# Patient Record
Sex: MALE | Race: BLACK OR AFRICAN AMERICAN | Employment: FULL TIME | URBAN - METROPOLITAN AREA
[De-identification: names, ages, dates, MRNs, and addresses within clinical notes are randomized per-mention and may not be internally consistent; named-entity substitution may affect disease eponyms.]

---

## 2017-02-09 ENCOUNTER — OFFICE VISIT (OUTPATIENT)
Dept: CARDIOLOGY CLINIC | Age: 62
End: 2017-02-09

## 2017-02-09 VITALS
SYSTOLIC BLOOD PRESSURE: 120 MMHG | HEART RATE: 68 BPM | DIASTOLIC BLOOD PRESSURE: 70 MMHG | WEIGHT: 299.6 LBS | BODY MASS INDEX: 39.53 KG/M2

## 2017-02-09 DIAGNOSIS — I25.10 CAD S/P PERCUTANEOUS CORONARY ANGIOPLASTY: ICD-10-CM

## 2017-02-09 DIAGNOSIS — Z98.61 CAD S/P PERCUTANEOUS CORONARY ANGIOPLASTY: ICD-10-CM

## 2017-02-09 DIAGNOSIS — I10 ESSENTIAL HYPERTENSION: Primary | ICD-10-CM

## 2017-02-09 PROCEDURE — 99213 OFFICE O/P EST LOW 20 MIN: CPT | Performed by: INTERNAL MEDICINE

## 2017-02-10 RX ORDER — POTASSIUM CHLORIDE 1500 MG/1
TABLET, EXTENDED RELEASE ORAL
Qty: 90 TABLET | Refills: 3 | Status: SHIPPED | OUTPATIENT
Start: 2017-02-10 | End: 2017-10-26 | Stop reason: SDUPTHER

## 2017-02-11 LAB
A/G RATIO: 1.6 (CALC) (ref 1–2.5)
ALBUMIN SERPL-MCNC: 4.1 G/DL (ref 3.6–5.1)
ALP BLD-CCNC: 68 U/L (ref 40–115)
ALT SERPL-CCNC: 20 U/L (ref 9–46)
AST SERPL-CCNC: 19 U/L (ref 10–35)
ATYPICAL LYMPHOCYTE RELATIVE PERCENT: NORMAL % (ref 0–10)
BAND NEUTROPHILS: NORMAL %
BANDED NEUTROPHILS ABSOLUTE COUNT: NORMAL CELLS/UL (ref 0–750)
BASOPHILS ABSOLUTE: 21 CELLS/UL (ref 0–200)
BASOPHILS RELATIVE PERCENT: 0.4 %
BILIRUB SERPL-MCNC: 0.8 MG/DL (ref 0.2–1.2)
BLASTS ABSOLUTE COUNT: NORMAL CELLS/UL
BLASTS RELATIVE PERCENT: NORMAL %
BUN / CREAT RATIO: NORMAL (CALC) (ref 6–22)
BUN BLDV-MCNC: 20 MG/DL (ref 7–25)
CALCIUM SERPL-MCNC: 9.6 MG/DL (ref 8.6–10.3)
CHLORIDE BLD-SCNC: 107 MMOL/L (ref 98–110)
CHOLESTEROL: 138 MG/DL (CALC)
CO2: 29 MMOL/L (ref 20–31)
COMMENT: NORMAL
CREAT SERPL-MCNC: 0.94 MG/DL (ref 0.7–1.25)
EOSINOPHILS ABSOLUTE: 109 CELLS/UL (ref 15–500)
EOSINOPHILS RELATIVE PERCENT: 2.1 %
GFR AFRICAN AMERICAN: 100 ML/MIN/1.73M2
GFR SERPL CREATININE-BSD FRML MDRD: 86 ML/MIN/1.73M2
GLOBULIN: 2.6 G/DL (CALC) (ref 1.9–3.7)
GLUCOSE BLD-MCNC: 95 MG/DL (ref 65–99)
HCT VFR BLD CALC: 43.1 % (ref 38.5–50)
HEMOGLOBIN: 14.2 G/DL (ref 13.2–17.1)
LYMPHOCYTES ABSOLUTE: 1461 CELLS/UL (ref 850–3900)
LYMPHOCYTES RELATIVE PERCENT: 28.1 %
MCH RBC QN AUTO: 30.2 PG (ref 27–33)
MCHC RBC AUTO-ENTMCNC: 32.9 G/DL (ref 32–36)
MCV RBC AUTO: 91.8 FL (ref 80–100)
METAMYELOCYTES ABSOLUTE COUNT: NORMAL CELLS/UL
METAMYELOCYTES RELATIVE PERCENT: NORMAL %
MONOCYTES ABSOLUTE: 374 CELLS/UL (ref 200–950)
MONOCYTES RELATIVE PERCENT: 7.2 %
MYELOCYTE PERCENT: NORMAL %
MYELOCYTES ABSOLUTE COUNT: NORMAL CELLS/UL
NEUTROPHILS ABSOLUTE: 3234 CELLS/UL (ref 1500–7800)
NUCLEATED RED BLOOD CELLS: NORMAL /100 WBC
NUCLEATED RED BLOOD CELLS: NORMAL CELLS/UL
PDW BLD-RTO: 13.1 % (ref 11–15)
PLATELET # BLD: 208 THOUSAND/UL (ref 140–400)
PMV BLD AUTO: 8.9 FL (ref 7.5–12.5)
POTASSIUM SERPL-SCNC: 5.2 MMOL/L (ref 3.5–5.3)
PROMYELOCYTES ABSOLUTE COUNT: NORMAL CELLS/UL
PROMYELOCYTES PERCENT: NORMAL %
RBC # BLD: 4.7 MILLION/UL (ref 4.2–5.8)
SEGMENTED NEUTROPHILS RELATIVE PERCENT: 62.2 %
SODIUM BLD-SCNC: 143 MMOL/L (ref 135–146)
TOTAL PROTEIN: 6.7 G/DL (ref 6.1–8.1)
WBC # BLD: 5.2 THOUSAND/UL (ref 3.8–10.8)

## 2017-02-15 ENCOUNTER — TELEPHONE (OUTPATIENT)
Dept: CARDIOLOGY CLINIC | Age: 62
End: 2017-02-15

## 2017-03-01 ENCOUNTER — TELEPHONE (OUTPATIENT)
Dept: CARDIOLOGY CLINIC | Age: 62
End: 2017-03-01

## 2017-03-01 ENCOUNTER — HOSPITAL ENCOUNTER (OUTPATIENT)
Dept: ENDOSCOPY | Age: 62
Discharge: HOME | End: 2017-03-01
Attending: INTERNAL MEDICINE | Admitting: INTERNAL MEDICINE

## 2017-03-01 VITALS
BODY MASS INDEX: 38.3 KG/M2 | WEIGHT: 289 LBS | SYSTOLIC BLOOD PRESSURE: 135 MMHG | DIASTOLIC BLOOD PRESSURE: 83 MMHG | TEMPERATURE: 97.9 F | OXYGEN SATURATION: 98 % | HEART RATE: 66 BPM | HEIGHT: 73 IN | RESPIRATION RATE: 18 BRPM

## 2017-03-01 ASSESSMENT — PAIN - FUNCTIONAL ASSESSMENT: PAIN_FUNCTIONAL_ASSESSMENT: 0-10

## 2017-05-05 ENCOUNTER — HOSPITAL ENCOUNTER (OUTPATIENT)
Dept: ENDOSCOPY | Age: 62
Discharge: HOME | End: 2017-05-05
Attending: INTERNAL MEDICINE | Admitting: INTERNAL MEDICINE

## 2017-05-05 VITALS
SYSTOLIC BLOOD PRESSURE: 125 MMHG | WEIGHT: 285 LBS | OXYGEN SATURATION: 98 % | RESPIRATION RATE: 20 BRPM | HEIGHT: 73 IN | BODY MASS INDEX: 37.77 KG/M2 | DIASTOLIC BLOOD PRESSURE: 67 MMHG | TEMPERATURE: 97.7 F | HEART RATE: 62 BPM

## 2017-05-05 RX ORDER — CLOPIDOGREL BISULFATE 75 MG/1
75 TABLET ORAL DAILY
COMMUNITY
End: 2017-11-14

## 2017-05-05 ASSESSMENT — PAIN - FUNCTIONAL ASSESSMENT: PAIN_FUNCTIONAL_ASSESSMENT: 0-10

## 2017-05-10 ENCOUNTER — OFFICE VISIT (OUTPATIENT)
Dept: FAMILY MEDICINE CLINIC | Age: 62
End: 2017-05-10

## 2017-05-10 VITALS
DIASTOLIC BLOOD PRESSURE: 80 MMHG | TEMPERATURE: 98.1 F | SYSTOLIC BLOOD PRESSURE: 138 MMHG | BODY MASS INDEX: 39.5 KG/M2 | WEIGHT: 299.4 LBS

## 2017-05-10 DIAGNOSIS — L03.011 CELLULITIS OF FINGER OF RIGHT HAND: Primary | ICD-10-CM

## 2017-05-10 PROCEDURE — 99213 OFFICE O/P EST LOW 20 MIN: CPT | Performed by: FAMILY MEDICINE

## 2017-05-10 RX ORDER — CEPHALEXIN 500 MG/1
500 CAPSULE ORAL 4 TIMES DAILY
Qty: 40 CAPSULE | Refills: 0 | Status: ON HOLD | OUTPATIENT
Start: 2017-05-10 | End: 2018-09-25 | Stop reason: ALTCHOICE

## 2017-05-10 ASSESSMENT — ENCOUNTER SYMPTOMS: EYES NEGATIVE: 1

## 2017-05-15 ENCOUNTER — NURSE ONLY (OUTPATIENT)
Dept: CARDIOLOGY CLINIC | Age: 62
End: 2017-05-15

## 2017-05-15 DIAGNOSIS — I25.10 CORONARY ARTERY DISEASE INVOLVING NATIVE CORONARY ARTERY OF NATIVE HEART WITHOUT ANGINA PECTORIS: Primary | ICD-10-CM

## 2017-05-15 PROCEDURE — 93000 ELECTROCARDIOGRAM COMPLETE: CPT | Performed by: INTERNAL MEDICINE

## 2017-05-16 ENCOUNTER — TELEPHONE (OUTPATIENT)
Dept: CARDIOLOGY | Age: 62
End: 2017-05-16

## 2017-05-22 ENCOUNTER — OFFICE VISIT (OUTPATIENT)
Dept: FAMILY MEDICINE CLINIC | Age: 62
End: 2017-05-22

## 2017-05-22 VITALS
BODY MASS INDEX: 38.99 KG/M2 | HEART RATE: 59 BPM | WEIGHT: 294.2 LBS | SYSTOLIC BLOOD PRESSURE: 112 MMHG | HEIGHT: 73 IN | TEMPERATURE: 98.7 F | DIASTOLIC BLOOD PRESSURE: 72 MMHG

## 2017-05-22 DIAGNOSIS — M67.911 DISORDER OF RIGHT ROTATOR CUFF: ICD-10-CM

## 2017-05-22 DIAGNOSIS — Z95.820 S/P ANGIOPLASTY WITH STENT: ICD-10-CM

## 2017-05-22 DIAGNOSIS — Z01.818 PREOPERATIVE CLEARANCE: Primary | ICD-10-CM

## 2017-05-22 PROCEDURE — 99242 OFF/OP CONSLTJ NEW/EST SF 20: CPT | Performed by: FAMILY MEDICINE

## 2017-05-22 ASSESSMENT — ENCOUNTER SYMPTOMS
WHEEZING: 0
RESPIRATORY NEGATIVE: 1
EYES NEGATIVE: 1
SHORTNESS OF BREATH: 0
ALLERGIC/IMMUNOLOGIC NEGATIVE: 1

## 2017-06-22 RX ORDER — METOPROLOL SUCCINATE 50 MG/1
TABLET, EXTENDED RELEASE ORAL
Qty: 90 TABLET | Refills: 3 | Status: SHIPPED | OUTPATIENT
Start: 2017-06-22 | End: 2018-04-05 | Stop reason: SDUPTHER

## 2017-08-02 RX ORDER — FUROSEMIDE 40 MG/1
TABLET ORAL
Qty: 90 TABLET | Refills: 2 | Status: SHIPPED | OUTPATIENT
Start: 2017-08-02 | End: 2017-12-19 | Stop reason: SDUPTHER

## 2017-08-07 RX ORDER — SIMVASTATIN 80 MG
TABLET ORAL
Qty: 90 TABLET | Refills: 5 | Status: SHIPPED | OUTPATIENT
Start: 2017-08-07 | End: 2018-08-23

## 2017-08-10 ENCOUNTER — OFFICE VISIT (OUTPATIENT)
Dept: CARDIOLOGY CLINIC | Age: 62
End: 2017-08-10

## 2017-08-10 VITALS
DIASTOLIC BLOOD PRESSURE: 60 MMHG | SYSTOLIC BLOOD PRESSURE: 130 MMHG | WEIGHT: 297.58 LBS | BODY MASS INDEX: 39.26 KG/M2 | HEART RATE: 70 BPM

## 2017-08-10 DIAGNOSIS — I25.10 CORONARY ARTERY DISEASE INVOLVING NATIVE CORONARY ARTERY OF NATIVE HEART WITHOUT ANGINA PECTORIS: Primary | ICD-10-CM

## 2017-08-10 DIAGNOSIS — E78.00 HYPERCHOLESTEROLEMIA: ICD-10-CM

## 2017-08-10 PROCEDURE — 99214 OFFICE O/P EST MOD 30 MIN: CPT | Performed by: INTERNAL MEDICINE

## 2017-08-30 ENCOUNTER — TELEPHONE (OUTPATIENT)
Dept: CARDIOLOGY CLINIC | Age: 62
End: 2017-08-30

## 2017-09-01 ENCOUNTER — OFFICE VISIT (OUTPATIENT)
Dept: FAMILY MEDICINE CLINIC | Age: 62
End: 2017-09-01

## 2017-09-01 ENCOUNTER — NURSE ONLY (OUTPATIENT)
Dept: CARDIOLOGY CLINIC | Age: 62
End: 2017-09-01

## 2017-09-01 VITALS
WEIGHT: 294.2 LBS | DIASTOLIC BLOOD PRESSURE: 74 MMHG | SYSTOLIC BLOOD PRESSURE: 120 MMHG | HEIGHT: 73 IN | HEART RATE: 67 BPM | BODY MASS INDEX: 38.99 KG/M2

## 2017-09-01 DIAGNOSIS — M24.212: ICD-10-CM

## 2017-09-01 DIAGNOSIS — G47.33 OBSTRUCTIVE SLEEP APNEA SYNDROME: ICD-10-CM

## 2017-09-01 DIAGNOSIS — I10 ESSENTIAL HYPERTENSION: ICD-10-CM

## 2017-09-01 DIAGNOSIS — Z01.818 PREOPERATIVE CLEARANCE: Primary | ICD-10-CM

## 2017-09-01 DIAGNOSIS — I25.10 CORONARY ARTERY DISEASE INVOLVING NATIVE CORONARY ARTERY OF NATIVE HEART WITHOUT ANGINA PECTORIS: Primary | ICD-10-CM

## 2017-09-01 PROCEDURE — 93000 ELECTROCARDIOGRAM COMPLETE: CPT | Performed by: INTERNAL MEDICINE

## 2017-09-01 PROCEDURE — 99242 OFF/OP CONSLTJ NEW/EST SF 20: CPT | Performed by: FAMILY MEDICINE

## 2017-09-01 ASSESSMENT — ENCOUNTER SYMPTOMS
EYES NEGATIVE: 1
APNEA: 1

## 2017-09-01 ASSESSMENT — PATIENT HEALTH QUESTIONNAIRE - PHQ9
SUM OF ALL RESPONSES TO PHQ9 QUESTIONS 1 & 2: 0
SUM OF ALL RESPONSES TO PHQ QUESTIONS 1-9: 0
1. LITTLE INTEREST OR PLEASURE IN DOING THINGS: 0
2. FEELING DOWN, DEPRESSED OR HOPELESS: 0

## 2017-09-08 ENCOUNTER — TELEPHONE (OUTPATIENT)
Dept: CARDIOLOGY CLINIC | Age: 62
End: 2017-09-08

## 2017-09-08 LAB
A/G RATIO: 1.7 (CALC) (ref 1–2.5)
ALBUMIN SERPL-MCNC: 3.9 G/DL (ref 3.6–5.1)
ALP BLD-CCNC: 74 U/L (ref 40–115)
ALT SERPL-CCNC: 35 U/L (ref 9–46)
AST SERPL-CCNC: 26 U/L (ref 10–35)
BILIRUB SERPL-MCNC: 0.6 MG/DL (ref 0.2–1.2)
BUN / CREAT RATIO: ABNORMAL (CALC) (ref 6–22)
BUN BLDV-MCNC: 11 MG/DL (ref 7–25)
CALCIUM SERPL-MCNC: 9.4 MG/DL (ref 8.6–10.3)
CHLORIDE BLD-SCNC: 106 MMOL/L (ref 98–110)
CHOLESTEROL, TOTAL: 161 MG/DL
CHOLESTEROL/HDL RATIO: 2.6 (CALC)
CHOLESTEROL: 99 MG/DL (CALC)
CO2: 29 MMOL/L (ref 20–31)
CREAT SERPL-MCNC: 0.88 MG/DL (ref 0.7–1.25)
GFR AFRICAN AMERICAN: 105 ML/MIN/1.73M2
GFR SERPL CREATININE-BSD FRML MDRD: 91 ML/MIN/1.73M2
GLOBULIN: 2.3 G/DL (CALC) (ref 1.9–3.7)
GLUCOSE BLD-MCNC: 105 MG/DL (ref 65–99)
HDLC SERPL-MCNC: 62 MG/DL
LDL CHOLESTEROL CALCULATED: 82 MG/DL (CALC)
POTASSIUM SERPL-SCNC: 5 MMOL/L (ref 3.5–5.3)
SODIUM BLD-SCNC: 140 MMOL/L (ref 135–146)
TOTAL PROTEIN: 6.2 G/DL (ref 6.1–8.1)
TRIGL SERPL-MCNC: 91 MG/DL

## 2017-09-13 RX ORDER — CLOPIDOGREL BISULFATE 75 MG/1
TABLET ORAL
Qty: 90 TABLET | Refills: 3 | Status: SHIPPED | OUTPATIENT
Start: 2017-09-13 | End: 2018-04-05 | Stop reason: SDUPTHER

## 2017-09-14 ENCOUNTER — TELEPHONE (OUTPATIENT)
Dept: CARDIOLOGY CLINIC | Age: 62
End: 2017-09-14

## 2017-09-18 DIAGNOSIS — E78.00 PURE HYPERCHOLESTEROLEMIA: Primary | ICD-10-CM

## 2017-10-09 RX ORDER — IBUPROFEN 800 MG/1
800 TABLET ORAL EVERY 6 HOURS PRN
Qty: 120 TABLET | Refills: 3 | Status: ON HOLD | OUTPATIENT
Start: 2017-10-09 | End: 2018-09-25 | Stop reason: HOSPADM

## 2017-10-26 NOTE — TELEPHONE ENCOUNTER
Jm Yen called. He stated he recently went Vegan and Dr. Matt Vo took him off his statin and his cholesterol continues to be good. But the swelling in his legs doesn't seem to ever go away. He does take lasix 40 mg Monday, Wednesday and Friday but even when he takes the lasix's those days the swelling does not seem to get any better. He does a cardio workout 6 days a week and has no problem heart wise but does not know what to do about the swelling. He wasn't sure if this was something he could take care of over the phone or if Dr. Matt Vo would like to see him in the office. Please advise. You can reach Jm Yen back @ [phone].   Thanks

## 2017-10-27 RX ORDER — POTASSIUM CHLORIDE 20 MEQ/1
20 TABLET, EXTENDED RELEASE ORAL DAILY
Qty: 90 TABLET | Refills: 3 | Status: SHIPPED | OUTPATIENT
Start: 2017-10-27 | End: 2018-04-05 | Stop reason: SDUPTHER

## 2017-11-14 ENCOUNTER — OFFICE VISIT (OUTPATIENT)
Dept: CARDIOLOGY CLINIC | Age: 62
End: 2017-11-14

## 2017-11-14 ENCOUNTER — TELEPHONE (OUTPATIENT)
Dept: CARDIOLOGY CLINIC | Age: 62
End: 2017-11-14

## 2017-11-14 VITALS
SYSTOLIC BLOOD PRESSURE: 130 MMHG | WEIGHT: 300 LBS | DIASTOLIC BLOOD PRESSURE: 80 MMHG | BODY MASS INDEX: 39.58 KG/M2 | HEART RATE: 60 BPM

## 2017-11-14 DIAGNOSIS — I10 ESSENTIAL HYPERTENSION: ICD-10-CM

## 2017-11-14 DIAGNOSIS — R60.0 LOCALIZED EDEMA: ICD-10-CM

## 2017-11-14 DIAGNOSIS — I25.10 CORONARY ARTERY DISEASE INVOLVING NATIVE CORONARY ARTERY OF NATIVE HEART WITHOUT ANGINA PECTORIS: Primary | ICD-10-CM

## 2017-11-14 PROCEDURE — G8427 DOCREV CUR MEDS BY ELIG CLIN: HCPCS | Performed by: NURSE PRACTITIONER

## 2017-11-14 PROCEDURE — 1036F TOBACCO NON-USER: CPT | Performed by: NURSE PRACTITIONER

## 2017-11-14 PROCEDURE — G8484 FLU IMMUNIZE NO ADMIN: HCPCS | Performed by: NURSE PRACTITIONER

## 2017-11-14 PROCEDURE — 3017F COLORECTAL CA SCREEN DOC REV: CPT | Performed by: NURSE PRACTITIONER

## 2017-11-14 PROCEDURE — G8417 CALC BMI ABV UP PARAM F/U: HCPCS | Performed by: NURSE PRACTITIONER

## 2017-11-14 PROCEDURE — G8598 ASA/ANTIPLAT THER USED: HCPCS | Performed by: NURSE PRACTITIONER

## 2017-11-14 PROCEDURE — 99214 OFFICE O/P EST MOD 30 MIN: CPT | Performed by: NURSE PRACTITIONER

## 2017-11-14 ASSESSMENT — ENCOUNTER SYMPTOMS
SINUS PAIN: 1
RESPIRATORY NEGATIVE: 1
GASTROINTESTINAL NEGATIVE: 1

## 2017-11-14 NOTE — LETTER
[address] [place] [address]  Phone: [phone]  Fax: [phone]    Angélica Aaron NP        November 14, 2017     Jacob Donohue MD  [address]  [facility] [address]    Patient: Irene Nava  MR Number: [id number]  YOB: 1955  Date of Visit: 11/14/2017    Dear Dr. Jacob Donohue:    I had the pleasure of seeing MR. Misty Crowell today for LE edema. If you have questions, please do not hesitate to call me. I look forward to following Ilda Ocampo along with you.     Sincerely,        Angélica Aaron NP

## 2017-11-14 NOTE — PROGRESS NOTES
works better than the other  2. Labs: BNP, BMP in one week  3. Referrals: Echo, consider venous duplex u/s to r/o venous insufficiency  4. Lifestyle Recommendations: decrease fluid intake to 2500cc/day, compression stockings, daily wts,   5. Follow up: with Dr. Jimena Jarrett for cardiac clearance next week, call if no change in sx/wt in one week  6. CHF Resource Line: [phone]      I appreciate the opportunity of cooperating in the care of this individual.    Basilio Astudillo CNP, 11/14/2017, 1:09 PM    QUALITY MEASURES  1. Tobacco Cessation Counseling: NA  2. Retake of BP if >140/90:   NA  3. Documentation to PCP/referring for new patient:  Sent to PCP at close of office visit  4. CAD patient on anti-platelet: Yes  5. CAD patient on STATIN therapy:  Yes  6. Patient with CHF and aFib on anticoagulation:  NA   7. Patient Education:  Yes   8. BB for LVSD :  NA   9. ACE/ARB for LVSD:  NA   10.  Left Ventricular Ejection Fraction (LVEF) Assessment:  No, ordered

## 2017-11-14 NOTE — PATIENT INSTRUCTIONS
1. Medications: Increase Lasix to 60mg once a day or try 40mg twice a day, see if one works better than the other  2. Labs: BNP, BMP in one week  3. Referrals: Echo, possible IR for venous ablation   4. Lifestyle Recommendations: decrease fluid intake to 2500cc/day, compression stockings  5. Follow up: with Dr. Julio Villasenor for cardiac clearance  Patient Education        Leg and Ankle Edema: Care Instructions  Your Care Instructions  Swelling in the legs, ankles, and feet is called edema. It is common after you sit or stand for a while. Long plane flights or car rides often cause swelling in the legs and feet. You may also have swelling if you have to stand for long periods of time at your job. Problems with the veins in the legs (varicose veins) and changes in hormones can also cause swelling. Sometimes the swelling in the ankles and feet is caused by a more serious problem, such as heart failure, infection, blood clots, or liver or kidney disease. Follow-up care is a key part of your treatment and safety. Be sure to make and go to all appointments, and call your doctor if you are having problems. Its also a good idea to know your test results and keep a list of the medicines you take. How can you care for yourself at home? If your doctor gave you medicine, take it as prescribed. Call your doctor if you think you are having a problem with your medicine. Whenever you are resting, raise your legs up. Try to keep the swollen area higher than the level of your heart. Take breaks from standing or sitting in one position. Walk around to increase the blood flow in your lower legs. Move your feet and ankles often while you stand, or tighten and relax your leg muscles. Wear support stockings. Put them on in the morning, before swelling gets worse. Eat a balanced diet. Lose weight if you need to. Limit the amount of salt (sodium) in your diet. Salt holds fluid in the body and may increase swelling.   When should you

## 2017-11-15 ENCOUNTER — HOSPITAL ENCOUNTER (OUTPATIENT)
Dept: NON INVASIVE DIAGNOSTICS | Age: 62
Discharge: HOME | End: 2017-11-15
Attending: NURSE PRACTITIONER | Admitting: NURSE PRACTITIONER

## 2017-11-15 DIAGNOSIS — R60.0 LOCALIZED EDEMA: ICD-10-CM

## 2017-11-15 LAB
LV EF: 55 %
LVEF MODALITY: NORMAL

## 2017-11-16 ENCOUNTER — OFFICE VISIT (OUTPATIENT)
Dept: CARDIOLOGY CLINIC | Age: 62
End: 2017-11-16

## 2017-11-16 VITALS
BODY MASS INDEX: 37.92 KG/M2 | HEART RATE: 76 BPM | WEIGHT: 287.4 LBS | DIASTOLIC BLOOD PRESSURE: 80 MMHG | SYSTOLIC BLOOD PRESSURE: 122 MMHG

## 2017-11-16 DIAGNOSIS — I10 ESSENTIAL HYPERTENSION: ICD-10-CM

## 2017-11-16 DIAGNOSIS — R60.0 LOCALIZED EDEMA: ICD-10-CM

## 2017-11-16 DIAGNOSIS — I25.10 CORONARY ARTERY DISEASE INVOLVING NATIVE CORONARY ARTERY OF NATIVE HEART WITHOUT ANGINA PECTORIS: Primary | ICD-10-CM

## 2017-11-16 PROCEDURE — G8417 CALC BMI ABV UP PARAM F/U: HCPCS | Performed by: INTERNAL MEDICINE

## 2017-11-16 PROCEDURE — 93000 ELECTROCARDIOGRAM COMPLETE: CPT | Performed by: INTERNAL MEDICINE

## 2017-11-16 PROCEDURE — G8598 ASA/ANTIPLAT THER USED: HCPCS | Performed by: INTERNAL MEDICINE

## 2017-11-16 PROCEDURE — 99213 OFFICE O/P EST LOW 20 MIN: CPT | Performed by: INTERNAL MEDICINE

## 2017-11-16 PROCEDURE — G8427 DOCREV CUR MEDS BY ELIG CLIN: HCPCS | Performed by: INTERNAL MEDICINE

## 2017-11-16 PROCEDURE — G8484 FLU IMMUNIZE NO ADMIN: HCPCS | Performed by: INTERNAL MEDICINE

## 2017-11-16 PROCEDURE — 1036F TOBACCO NON-USER: CPT | Performed by: INTERNAL MEDICINE

## 2017-11-16 PROCEDURE — 3017F COLORECTAL CA SCREEN DOC REV: CPT | Performed by: INTERNAL MEDICINE

## 2017-11-16 NOTE — PROGRESS NOTES
Negative. Psychiatric/Behavioral: Negative. Objective:   Physical Exam   Nursing note and vitals reviewed. Constitutional: He is oriented to person, place, and time. He appears well-developed and well-nourished. No distress. HENT:   Head: Normocephalic and atraumatic. Mouth/Throat: No oropharyngeal exudate. Eyes: Conjunctivae are normal. Pupils are equal, round, and reactive to light. Right eye exhibits no discharge. Left eye exhibits no discharge. No scleral icterus. Neck: Normal range of motion. Neck supple. No JVD present. No tracheal deviation present. No thyromegaly present. Cardiovascular: Normal rate, regular rhythm, normal heart sounds and intact distal pulses. Exam reveals no gallop and no friction rub. No murmur heard. Pulmonary/Chest: Effort normal. No stridor. No respiratory distress. He has no wheezes. He has no rales. He exhibits no tenderness. Abdominal: Soft. Bowel sounds are normal. He exhibits no distension and no mass. No tenderness. He has no rebound and no guarding. Musculoskeletal: He exhibits LE edema and no tenderness. Lymphadenopathy:     He has no cervical adenopathy. Neurological: He is alert and oriented to person, place, and time. No cranial nerve deficit. Coordination normal.   Skin: Skin is warm and dry. No rash noted. He is not diaphoretic. No erythema. No pallor. Psychiatric: He has a normal mood and affect. His behavior is normal. Judgment and thought content normal.       Assessment:      Patient Active Problem List   Diagnosis    CAD (coronary artery disease)    Hypercholesterolemia    BPH (benign prostatic hyperplasia)    Beatriz Morgan cyst    Other tenosynovitis of hand and wrist    Hypertension           Plan:      Continue current medications. Stable cardiovascular status. Stable after stenting. Echo 11/15/17 was WNL. Continue diuretics and kcl.

## 2017-11-20 DIAGNOSIS — E78.00 HYPERCHOLESTEROLEMIA: ICD-10-CM

## 2017-11-20 DIAGNOSIS — E78.00 PURE HYPERCHOLESTEROLEMIA: ICD-10-CM

## 2017-11-20 DIAGNOSIS — I25.10 CORONARY ARTERY DISEASE INVOLVING NATIVE CORONARY ARTERY OF NATIVE HEART WITHOUT ANGINA PECTORIS: ICD-10-CM

## 2017-11-20 LAB
A/G RATIO: 2 (ref 1.1–2.2)
ALBUMIN SERPL-MCNC: 4 G/DL (ref 3.4–5)
ALP BLD-CCNC: 78 U/L (ref 40–129)
ALT SERPL-CCNC: 18 U/L (ref 10–40)
ANION GAP SERPL CALCULATED.3IONS-SCNC: 12 MMOL/L (ref 3–16)
AST SERPL-CCNC: 20 U/L (ref 15–37)
BILIRUB SERPL-MCNC: 0.5 MG/DL (ref 0–1)
BUN BLDV-MCNC: 13 MG/DL (ref 7–20)
CALCIUM SERPL-MCNC: 9.3 MG/DL (ref 8.3–10.6)
CHLORIDE BLD-SCNC: 101 MMOL/L (ref 99–110)
CHOLESTEROL, TOTAL: 212 MG/DL (ref 0–199)
CO2: 27 MMOL/L (ref 21–32)
CREAT SERPL-MCNC: 0.8 MG/DL (ref 0.8–1.3)
GFR AFRICAN AMERICAN: >60
GFR NON-AFRICAN AMERICAN: >60
GLOBULIN: 2 G/DL
GLUCOSE BLD-MCNC: 99 MG/DL (ref 70–99)
HDLC SERPL-MCNC: 56 MG/DL (ref 40–60)
LDL CHOLESTEROL CALCULATED: 136 MG/DL
POTASSIUM SERPL-SCNC: 4.3 MMOL/L (ref 3.5–5.1)
SODIUM BLD-SCNC: 140 MMOL/L (ref 136–145)
TOTAL PROTEIN: 6 G/DL (ref 6.4–8.2)
TRIGL SERPL-MCNC: 102 MG/DL (ref 0–150)
VLDLC SERPL CALC-MCNC: 20 MG/DL

## 2017-11-21 ENCOUNTER — TELEPHONE (OUTPATIENT)
Dept: FAMILY MEDICINE CLINIC | Age: 62
End: 2017-11-21

## 2017-11-21 ENCOUNTER — TELEPHONE (OUTPATIENT)
Dept: CARDIOLOGY CLINIC | Age: 62
End: 2017-11-21

## 2017-11-21 DIAGNOSIS — E78.2 MIXED HYPERLIPIDEMIA: Primary | ICD-10-CM

## 2017-11-21 NOTE — TELEPHONE ENCOUNTER
Pt needs a refill of clotrimazole-betamethasone (LOTRISONE) 1-0.05 % cream  Pt change pharmacy please send to [facility]

## 2017-11-22 RX ORDER — CLOTRIMAZOLE AND BETAMETHASONE DIPROPIONATE 10; .64 MG/G; MG/G
CREAM TOPICAL
Qty: 45 G | Refills: 2 | Status: ON HOLD | OUTPATIENT
Start: 2017-11-22 | End: 2018-09-25 | Stop reason: ALTCHOICE

## 2017-12-04 ENCOUNTER — OFFICE VISIT (OUTPATIENT)
Dept: FAMILY MEDICINE CLINIC | Age: 62
End: 2017-12-04

## 2017-12-04 VITALS
HEIGHT: 72 IN | BODY MASS INDEX: 40.44 KG/M2 | HEART RATE: 62 BPM | WEIGHT: 298.6 LBS | TEMPERATURE: 68.6 F | DIASTOLIC BLOOD PRESSURE: 84 MMHG | SYSTOLIC BLOOD PRESSURE: 138 MMHG

## 2017-12-04 DIAGNOSIS — I10 ESSENTIAL HYPERTENSION: ICD-10-CM

## 2017-12-04 DIAGNOSIS — I25.10 CORONARY ARTERY DISEASE INVOLVING NATIVE CORONARY ARTERY OF NATIVE HEART WITHOUT ANGINA PECTORIS: ICD-10-CM

## 2017-12-04 DIAGNOSIS — Z01.818 PREOPERATIVE CLEARANCE: Primary | ICD-10-CM

## 2017-12-04 DIAGNOSIS — T14.8XXA TORN MUSCLE: ICD-10-CM

## 2017-12-04 PROCEDURE — G8482 FLU IMMUNIZE ORDER/ADMIN: HCPCS | Performed by: FAMILY MEDICINE

## 2017-12-04 PROCEDURE — 99213 OFFICE O/P EST LOW 20 MIN: CPT | Performed by: FAMILY MEDICINE

## 2017-12-04 PROCEDURE — G8427 DOCREV CUR MEDS BY ELIG CLIN: HCPCS | Performed by: FAMILY MEDICINE

## 2017-12-04 PROCEDURE — G8417 CALC BMI ABV UP PARAM F/U: HCPCS | Performed by: FAMILY MEDICINE

## 2017-12-04 PROCEDURE — 3017F COLORECTAL CA SCREEN DOC REV: CPT | Performed by: FAMILY MEDICINE

## 2017-12-04 ASSESSMENT — ENCOUNTER SYMPTOMS
EYES NEGATIVE: 1
ALLERGIC/IMMUNOLOGIC NEGATIVE: 1
RESPIRATORY NEGATIVE: 1
GASTROINTESTINAL NEGATIVE: 1

## 2017-12-04 NOTE — PROGRESS NOTES
Subjective:      Patient ID: Beverley Laird is a 57 y.o. male. Chief Complaint   Patient presents with    Pre-op Exam     12/22/17 [facility] Flanagan [facility]  torn muscle repair hip/buttock       HPI  Tor gluteus muscle Left  Pain did start Oct 2017  Need to attach it to hip Left   Past Medical History:   Diagnosis Date    S/P colonoscopy sept 2010    BN    Shoulder injury     Left /Dr Elio Rojas (football)    Torn meniscus     LT /Dr Elio Rojas (football)     Past Surgical History:   Procedure Laterality Date    BACK SURGERY  2014    CORONARY ANGIOPLASTY WITH STENT PLACEMENT  2006    x2    HAND SURGERY  2005,2010    thumb ,nando trigger    HEMORRHOID SURGERY  2010    KNEE SURGERY  [id number]    RT x 3 one scope & 2 major     Current Outpatient Prescriptions   Medication Sig Dispense Refill    clotrimazole-betamethasone (LOTRISONE) 1-0.05 % cream APPLY TOPICALLY 2 TIMES DAILY. 45 g 2    potassium chloride (KLOR-CON M20) 20 MEQ extended release tablet Take 1 tablet by mouth daily 90 tablet 3    clopidogrel (PLAVIX) 75 MG tablet TAKE 1 TABLET BY MOUTH DAILY. 90 tablet 3    simvastatin (ZOCOR) 80 MG tablet TAKE 1 TABLET BY MOUTH NIGHTLY 90 tablet 5    furosemide (LASIX) 40 MG tablet TAKE 1 TABLET BY MOUTH DAILY 90 tablet 2    metoprolol succinate (TOPROL XL) 50 MG extended release tablet TAKE 1 TABLET BY MOUTH DAILY. 90 tablet 3    aspirin 81 MG tablet Take 81 mg by mouth daily      omeprazole (PRILOSEC) 20 MG capsule Take 20 mg by mouth daily.  tamsulosin (FLOMAX) 0.4 MG capsule Take 0.4 mg by mouth 2 times daily.  ibuprofen (ADVIL;MOTRIN) 800 MG tablet Take 1 tablet by mouth every 6 hours as needed for Pain 120 tablet 3    cephALEXin (KEFLEX) 500 MG capsule Take 1 capsule by mouth 4 times daily 40 capsule 0     No current facility-administered medications for this visit.       No Known Allergies      Family History   Problem Relation Age of Onset    Cancer Mother      Ovarian ca Component Value Date    LABVLDL 20 11/20/2017    LABVLDL 11 02/09/2015     Lab Results   Component Value Date    CHOLHDLRATIO 2.6 09/07/2017    CHOLHDLRATIO 2.7 01/26/2016    CHOLHDLRATIO 2.3 11/14/2013       Review of Systems   Constitutional: Negative. HENT: Negative. Eyes: Negative. Respiratory: Negative. Cardiovascular:        Cardiology Dr Marlys Ponce clearance done   EKG  ECHO   Gastrointestinal: Negative. Endocrine: Negative. Genitourinary: Negative. Musculoskeletal:        Pain in hip    Skin: Negative. Allergic/Immunologic: Negative. Neurological: Negative. Hematological: Negative. Psychiatric/Behavioral: Negative. Objective:   Physical Exam   Constitutional: He is oriented to person, place, and time. He appears well-developed and well-nourished. No distress. HENT:   Head: Normocephalic. Right Ear: External ear normal.   Left Ear: External ear normal.   Nose: Nose normal.   Eyes: Conjunctivae and EOM are normal. Pupils are equal, round, and reactive to light. Neck: Normal range of motion. Neck supple. Cardiovascular: Normal rate, regular rhythm and normal heart sounds. No murmur heard. Pulmonary/Chest: Effort normal and breath sounds normal.   Abdominal: Soft. Bowel sounds are normal.   Musculoskeletal: He exhibits no edema. Neurological: He is alert and oriented to person, place, and time. Skin: Skin is warm. He is not diaphoretic. Psychiatric: He has a normal mood and affect. His behavior is normal. Judgment and thought content normal.   Vitals reviewed. /84   Pulse 62   Temp (!) 68.6 °F (20.3 °C) (Oral)   Ht 5' 11.5\" (1.816 m)   Wt 298 lb 9.6 oz (135.4 kg)   BMI 41.07 kg/m²     Assessment:      1. Preoperative clearance     2. Torn muscle     3. Essential hypertension     4.  Coronary artery disease involving native coronary artery of native heart without angina pectoris             Plan:          OK for planned surgery by  Sandy Diaz   Cardiology Clearance done with Dr Niko Garcia  Pt is aware of risk ,possible complications of surgery  Surgery explained to pt by Surgeon  Recovery time is about 6-7 weeks   He will use cane

## 2017-12-18 ENCOUNTER — TELEPHONE (OUTPATIENT)
Dept: FAMILY MEDICINE CLINIC | Age: 62
End: 2017-12-18

## 2017-12-19 ENCOUNTER — OFFICE VISIT (OUTPATIENT)
Dept: CARDIOLOGY CLINIC | Age: 62
End: 2017-12-19

## 2017-12-19 VITALS
HEART RATE: 62 BPM | WEIGHT: 302 LBS | DIASTOLIC BLOOD PRESSURE: 68 MMHG | SYSTOLIC BLOOD PRESSURE: 120 MMHG | BODY MASS INDEX: 41.53 KG/M2

## 2017-12-19 DIAGNOSIS — I25.10 CORONARY ARTERY DISEASE INVOLVING NATIVE CORONARY ARTERY OF NATIVE HEART WITHOUT ANGINA PECTORIS: ICD-10-CM

## 2017-12-19 DIAGNOSIS — R60.0 LOCALIZED EDEMA: Primary | ICD-10-CM

## 2017-12-19 DIAGNOSIS — I10 ESSENTIAL HYPERTENSION: ICD-10-CM

## 2017-12-19 PROCEDURE — G8598 ASA/ANTIPLAT THER USED: HCPCS | Performed by: NURSE PRACTITIONER

## 2017-12-19 PROCEDURE — 1036F TOBACCO NON-USER: CPT | Performed by: NURSE PRACTITIONER

## 2017-12-19 PROCEDURE — G8482 FLU IMMUNIZE ORDER/ADMIN: HCPCS | Performed by: NURSE PRACTITIONER

## 2017-12-19 PROCEDURE — 3017F COLORECTAL CA SCREEN DOC REV: CPT | Performed by: NURSE PRACTITIONER

## 2017-12-19 PROCEDURE — 99213 OFFICE O/P EST LOW 20 MIN: CPT | Performed by: NURSE PRACTITIONER

## 2017-12-19 PROCEDURE — G8417 CALC BMI ABV UP PARAM F/U: HCPCS | Performed by: NURSE PRACTITIONER

## 2017-12-19 PROCEDURE — G8427 DOCREV CUR MEDS BY ELIG CLIN: HCPCS | Performed by: NURSE PRACTITIONER

## 2017-12-19 RX ORDER — FUROSEMIDE 80 MG
80 TABLET ORAL DAILY
Qty: 30 TABLET | Refills: 3 | Status: SHIPPED | OUTPATIENT
Start: 2017-12-19 | End: 2017-12-19 | Stop reason: SDUPTHER

## 2017-12-19 RX ORDER — FUROSEMIDE 80 MG
80 TABLET ORAL DAILY
Qty: 30 TABLET | Refills: 3 | Status: SHIPPED | OUTPATIENT
Start: 2017-12-19 | End: 2018-04-05 | Stop reason: SDUPTHER

## 2017-12-19 ASSESSMENT — ENCOUNTER SYMPTOMS
GASTROINTESTINAL NEGATIVE: 1
RESPIRATORY NEGATIVE: 1
SINUS PAIN: 0

## 2017-12-19 NOTE — PATIENT INSTRUCTIONS
1. Medications: Increase Lasix to 80mg once a day  2. Labs: with surgery  3. Referrals: consider venous duplex u/s to r/o venous insufficiency after recovery from surgery  4. Lifestyle Recommendations: decrease fluid intake to 2000cc/day, compression stockings, daily wts,   5. Follow up: as needed after surgery to evaluate VI  6. CHF Resource Line: [phone]  Patient Education        Leg and Ankle Edema: Care Instructions  Your Care Instructions  Swelling in the legs, ankles, and feet is called edema. It is common after you sit or stand for a while. Long plane flights or car rides often cause swelling in the legs and feet. You may also have swelling if you have to stand for long periods of time at your job. Problems with the veins in the legs (varicose veins) and changes in hormones can also cause swelling. Sometimes the swelling in the ankles and feet is caused by a more serious problem, such as heart failure, infection, blood clots, or liver or kidney disease. Follow-up care is a key part of your treatment and safety. Be sure to make and go to all appointments, and call your doctor if you are having problems. It's also a good idea to know your test results and keep a list of the medicines you take. How can you care for yourself at home? If your doctor gave you medicine, take it as prescribed. Call your doctor if you think you are having a problem with your medicine. Whenever you are resting, raise your legs up. Try to keep the swollen area higher than the level of your heart. Take breaks from standing or sitting in one position. Walk around to increase the blood flow in your lower legs. Move your feet and ankles often while you stand, or tighten and relax your leg muscles. Wear support stockings. Put them on in the morning, before swelling gets worse. Eat a balanced diet. Lose weight if you need to. Limit the amount of salt (sodium) in your diet.  Salt holds fluid in the body and may increase swelling. When should you call for help? Call 911 anytime you think you may need emergency care. For example, call if:  ? You have symptoms of a blood clot in your lung (called a pulmonary embolism). These may include:  Sudden chest pain. Trouble breathing. Coughing up blood. ?Call your doctor now or seek immediate medical care if:  ? You have signs of a blood clot, such as:  Pain in your calf, back of the knee, thigh, or groin. Redness and swelling in your leg or groin. ? You have symptoms of infection, such as: Increased pain, swelling, warmth, or redness. Red streaks or pus. A fever. ? Watch closely for changes in your health, and be sure to contact your doctor if:  ? Your swelling is getting worse. ? You have new or worsening pain in your legs. ? You do not get better as expected. Where can you learn more? Go to [email or web address]://[email or web address].Ghostery. org and sign in to your VNG account. Enter V641 in the P[employer]R Labpak box to learn more about \"Leg and Ankle Edema: Care Instructions. \"     If you do not have an account, please click on the \"Sign Up Now\" link. Current as of: March 20, 2017  Content Version: 11.4  © 6941-2814 Healthwise, Incorporated. Care instructions adapted under license by [facility] Center[facility]rol[facility] Software [facility] ([facility]). If you have questions about a medical condition or this instruction, always ask your healthcare professional. [place] [address] any warranty or liability for your use of this information.

## 2017-12-19 NOTE — PROGRESS NOTES
ibuprofen (ADVIL;MOTRIN) 800 MG tablet Take 1 tablet by mouth every 6 hours as needed for Pain 10/9/17   Rosalva Cedeño MD   clopidogrel (PLAVIX) 75 MG tablet TAKE 1 TABLET BY MOUTH DAILY. 9/13/17   Asia Madison MD   simvastatin (ZOCOR) 80 MG tablet TAKE 1 TABLET BY MOUTH NIGHTLY 8/7/17   Otilio Zaragoza MD   furosemide (LASIX) 40 MG tablet TAKE 1 TABLET BY MOUTH DAILY 8/2/17   Otilio Zaragoza MD   metoprolol succinate (TOPROL XL) 50 MG extended release tablet TAKE 1 TABLET BY MOUTH DAILY. 6/22/17   Otilio Zaragoza MD   cephALEXin (KEFLEX) 500 MG capsule Take 1 capsule by mouth 4 times daily 5/10/17   Rosalva Cedeño MD   aspirin 81 MG tablet Take 81 mg by mouth daily    Historical Provider, MD   omeprazole (PRILOSEC) 20 MG capsule Take 20 mg by mouth daily. Historical Provider, MD   tamsulosin (FLOMAX) 0.4 MG capsule Take 0.4 mg by mouth 2 times daily. Historical Provider, MD        Allergies:  Review of patient's allergies indicates no known allergies. ROS:   Review of Systems   Constitutional: Negative. HENT: Negative. Negative for sinus pain. Respiratory: Negative. Cardiovascular: Positive for leg swelling. Gastrointestinal: Negative. Genitourinary: Negative. Musculoskeletal: Negative. Skin: Negative. Neurological: Negative. Hematological: Negative. Psychiatric/Behavioral: Negative. Physical Examination:    There were no vitals filed for this visit. Physical Exam   Constitutional: He is oriented to person, place, and time. He appears well-developed and well-nourished. HENT:   Head: Normocephalic and atraumatic. Eyes: Conjunctivae are normal. Pupils are equal, round, and reactive to light. Neck: Normal range of motion. Neck supple. Cardiovascular: Normal rate, regular rhythm, normal heart sounds and intact distal pulses. Pulmonary/Chest: Effort normal and breath sounds normal.   Abdominal: Soft. Musculoskeletal: Normal range of motion.  He insufficiency   3. Essential hypertension - controlled         Plan:   1. Medications: Increase Lasix to 80mg once a day  2. Labs: with surgery  3. Referrals: consider venous duplex u/s to r/o venous insufficiency after recovery from surgery  4. Lifestyle Recommendations: decrease fluid intake to 2000cc/day, compression stockings, daily wts,   5. Follow up: as needed after surgery to evaluate VI  6. CHF Resource Line: [phone]      I appreciate the opportunity of cooperating in the care of this individual.    Jazmine Chiang CNP, 12/19/2017, 8:36 AM    QUALITY MEASURES  1. Tobacco Cessation Counseling: NA  2. Retake of BP if >140/90:   NA  3. Documentation to PCP/referring for new patient:  Sent to PCP at close of office visit  4. CAD patient on anti-platelet: Yes  5. CAD patient on STATIN therapy:  Yes  6. Patient with CHF and aFib on anticoagulation:  NA   7. Patient Education:  Yes   8. BB for LVSD :  NA   9. ACE/ARB for LVSD:  NA   10.  Left Ventricular Ejection Fraction (LVEF) Assessment: Yes

## 2017-12-19 NOTE — LETTER
[address] [place] [address]  Phone: [phone]  Fax: [phone]    Juanita Celestin NP        December 19, 2017     Dennys Gonzales MD  [id number] [address] [address]    Patient: Otilio Tripp  MR Number: [id number]  YOB: 1955  Date of Visit: 12/19/2017    Dear Dr. Dennys Gonzales: If you have questions, please do not hesitate to call me. I look forward to following Rella  along with you.     Sincerely,        Juanita Celestin NP

## 2018-02-15 ENCOUNTER — OFFICE VISIT (OUTPATIENT)
Dept: CARDIOLOGY CLINIC | Age: 63
End: 2018-02-15

## 2018-02-15 VITALS
WEIGHT: 302.2 LBS | BODY MASS INDEX: 41.56 KG/M2 | SYSTOLIC BLOOD PRESSURE: 90 MMHG | HEART RATE: 60 BPM | DIASTOLIC BLOOD PRESSURE: 60 MMHG

## 2018-02-15 DIAGNOSIS — I25.10 CORONARY ARTERY DISEASE INVOLVING NATIVE CORONARY ARTERY OF NATIVE HEART WITHOUT ANGINA PECTORIS: ICD-10-CM

## 2018-02-15 DIAGNOSIS — I10 ESSENTIAL HYPERTENSION: Primary | ICD-10-CM

## 2018-02-15 PROCEDURE — 99213 OFFICE O/P EST LOW 20 MIN: CPT | Performed by: INTERNAL MEDICINE

## 2018-02-15 NOTE — PROGRESS NOTES
Subjective:      Patient ID: Valdemar Goodwin is a 57 y.o. male. CC:  Follow up CAD    HPI:   Mr. Kika Gomes is doing well overall. No chest pain and edema has gotten worse and was started on lasix 80 mg am with improvement in legs. Lifts weights still. 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 Patient  has a past medical history of Torn meniscus; S/P colonoscopy; and Shoulder injury. Current Outpatient Prescriptions   Medication Sig Dispense Refill    ibuprofen (ADVIL;MOTRIN) 800 MG tablet TAKE 1 TABLET BY MOUTH EVERY 8 HOURS FOR PAIN  90 tablet  0    simvastatin (ZOCOR) 80 MG tablet TAKE 1 TABLET BY MOUTH NIGHTLY  30 tablet  6    atovaquone-proguanil (MALARONE) 250-100 MG per tablet Take 1 tablet by mouth daily. 21 tablet  0    clopidogrel (PLAVIX) 75 MG tablet TAKE 1 TABLET BY MOUTH DAILY. 30 tablet  6    metoprolol (TOPROL-XL) 50 MG XL tablet TAKE 1 TABLET BY MOUTH DAILY. 30 tablet  6    aspirin 81 MG EC tablet Take 81 mg by mouth daily. No current facility-administered medications for this visit. Vitals  Weight: 280 lb (127.007 kg)  Blood Pressure:  104/62  Pulse: 76     Review of Systems   Constitutional: Negative. HENT: Negative. Eyes: Negative. Respiratory: Negative. Cardiovascular: Negative. Gastrointestinal: Negative. Genitourinary: Negative. Musculoskeletal: Negative. Neurological: Negative. Hematological: Negative.     Psychiatric/Behavioral: Negative. Objective:   Physical Exam   Nursing note and vitals reviewed. Constitutional: He is oriented to person, place, and time. He appears well-developed and well-nourished. No distress. HENT:   Head: Normocephalic and atraumatic. Mouth/Throat: No oropharyngeal exudate. Eyes: Conjunctivae are normal. Pupils are equal, round, and reactive to light. Right eye exhibits no discharge. Left eye exhibits no discharge. No scleral icterus. Neck: Normal range of motion. Neck supple. No JVD present. No tracheal deviation present. No thyromegaly present. Cardiovascular: Normal rate, regular rhythm, normal heart sounds and intact distal pulses. Exam reveals no gallop and no friction rub. No murmur heard. Pulmonary/Chest: Effort normal. No stridor. No respiratory distress. He has no wheezes. He has no rales. He exhibits no tenderness. Abdominal: Soft. Bowel sounds are normal. He exhibits no distension and no mass. No tenderness. He has no rebound and no guarding. Musculoskeletal: He exhibits LE edema and no tenderness. Lymphadenopathy:     He has no cervical adenopathy. Neurological: He is alert and oriented to person, place, and time. No cranial nerve deficit. Coordination normal.   Skin: Skin is warm and dry. No rash noted. He is not diaphoretic. No erythema. No pallor. Psychiatric: He has a normal mood and affect. His behavior is normal. Judgment and thought content normal.       Assessment:      Patient Active Problem List   Diagnosis    CAD (coronary artery disease)    Hypercholesterolemia    BPH (benign prostatic hyperplasia)    Marques Florissant cyst    Other tenosynovitis of hand and wrist    Hypertension           Plan:      Continue current medications. Stable cardiovascular status. Stable after stenting. Echo 11/15/17 was WNL. Edema:   Continue 80 mg lasix and kcl.

## 2018-04-11 RX ORDER — POTASSIUM CHLORIDE 20 MEQ/1
20 TABLET, EXTENDED RELEASE ORAL DAILY
Qty: 90 TABLET | Refills: 3 | Status: SHIPPED | OUTPATIENT
Start: 2018-04-11 | End: 2018-08-31 | Stop reason: SDUPTHER

## 2018-04-11 RX ORDER — FUROSEMIDE 80 MG
80 TABLET ORAL DAILY
Qty: 30 TABLET | Refills: 3 | Status: SHIPPED | OUTPATIENT
Start: 2018-04-11 | End: 2018-09-26 | Stop reason: SDUPTHER

## 2018-04-11 RX ORDER — METOPROLOL SUCCINATE 50 MG/1
TABLET, EXTENDED RELEASE ORAL
Qty: 90 TABLET | Refills: 3 | Status: SHIPPED | OUTPATIENT
Start: 2018-04-11 | End: 2018-09-05 | Stop reason: SDUPTHER

## 2018-04-11 RX ORDER — CLOPIDOGREL BISULFATE 75 MG/1
TABLET ORAL
Qty: 90 TABLET | Refills: 3 | Status: SHIPPED | OUTPATIENT
Start: 2018-04-11 | End: 2018-09-23 | Stop reason: SDUPTHER

## 2018-04-27 ENCOUNTER — HOSPITAL ENCOUNTER (OUTPATIENT)
Dept: ENDOSCOPY | Age: 63
Discharge: HOME | End: 2018-04-27
Attending: INTERNAL MEDICINE | Admitting: INTERNAL MEDICINE

## 2018-04-27 VITALS
HEIGHT: 73 IN | DIASTOLIC BLOOD PRESSURE: 65 MMHG | WEIGHT: 285 LBS | BODY MASS INDEX: 37.77 KG/M2 | RESPIRATION RATE: 18 BRPM | OXYGEN SATURATION: 98 % | SYSTOLIC BLOOD PRESSURE: 116 MMHG | HEART RATE: 50 BPM | TEMPERATURE: 97.1 F

## 2018-08-22 DIAGNOSIS — I10 ESSENTIAL HYPERTENSION: ICD-10-CM

## 2018-08-22 DIAGNOSIS — I25.10 CORONARY ARTERY DISEASE INVOLVING NATIVE CORONARY ARTERY OF NATIVE HEART WITHOUT ANGINA PECTORIS: ICD-10-CM

## 2018-08-22 LAB
CHOLESTEROL, TOTAL: 169 MG/DL (ref 0–199)
HDLC SERPL-MCNC: 62 MG/DL (ref 40–60)
LDL CHOLESTEROL CALCULATED: 94 MG/DL
TRIGL SERPL-MCNC: 67 MG/DL (ref 0–150)
VLDLC SERPL CALC-MCNC: 13 MG/DL

## 2018-08-23 ENCOUNTER — OFFICE VISIT (OUTPATIENT)
Dept: CARDIOLOGY CLINIC | Age: 63
End: 2018-08-23

## 2018-08-23 VITALS
DIASTOLIC BLOOD PRESSURE: 60 MMHG | SYSTOLIC BLOOD PRESSURE: 110 MMHG | HEART RATE: 56 BPM | WEIGHT: 290.4 LBS | BODY MASS INDEX: 38.31 KG/M2

## 2018-08-23 DIAGNOSIS — I10 ESSENTIAL HYPERTENSION: Primary | ICD-10-CM

## 2018-08-23 DIAGNOSIS — E78.2 MIXED HYPERLIPIDEMIA: ICD-10-CM

## 2018-08-23 DIAGNOSIS — I25.10 CORONARY ARTERY DISEASE INVOLVING NATIVE CORONARY ARTERY OF NATIVE HEART WITHOUT ANGINA PECTORIS: ICD-10-CM

## 2018-08-23 PROCEDURE — 99214 OFFICE O/P EST MOD 30 MIN: CPT | Performed by: INTERNAL MEDICINE

## 2018-08-23 RX ORDER — ROSUVASTATIN CALCIUM 10 MG/1
10 TABLET, COATED ORAL NIGHTLY
Qty: 30 TABLET | Refills: 11 | Status: SHIPPED | OUTPATIENT
Start: 2018-08-23 | End: 2018-11-28 | Stop reason: SDUPTHER

## 2018-08-31 NOTE — TELEPHONE ENCOUNTER
Requested Prescriptions     Pending Prescriptions Disp Refills    potassium chloride (KLOR-CON M20) 20 MEQ extended release tablet 90 tablet 3     Sig: Take 1 tablet by mouth daily     Last Fill         Last seen        Next appointment     4/11/18 8/23/18 2/28/19         Last labs     Ordering Physician        8/22/18 Chandu

## 2018-09-03 RX ORDER — POTASSIUM CHLORIDE 20 MEQ/1
20 TABLET, EXTENDED RELEASE ORAL DAILY
Qty: 90 TABLET | Refills: 3 | Status: SHIPPED | OUTPATIENT
Start: 2018-09-03 | End: 2019-07-03 | Stop reason: SDUPTHER

## 2018-09-05 RX ORDER — METOPROLOL SUCCINATE 50 MG/1
TABLET, EXTENDED RELEASE ORAL
Qty: 90 TABLET | Refills: 3 | Status: SHIPPED | OUTPATIENT
Start: 2018-09-05 | End: 2019-07-03 | Stop reason: SDUPTHER

## 2018-09-24 RX ORDER — CLOPIDOGREL BISULFATE 75 MG/1
TABLET ORAL
Qty: 90 TABLET | Refills: 1 | Status: SHIPPED | OUTPATIENT
Start: 2018-09-24 | End: 2018-11-26 | Stop reason: SDUPTHER

## 2018-09-25 ENCOUNTER — HOSPITAL ENCOUNTER (OUTPATIENT)
Age: 63
Setting detail: OUTPATIENT SURGERY
Discharge: HOME | End: 2018-09-25
Attending: INTERNAL MEDICINE | Admitting: INTERNAL MEDICINE
Payer: COMMERCIAL

## 2018-09-25 VITALS
HEART RATE: 95 BPM | WEIGHT: 279 LBS | TEMPERATURE: 98.2 F | HEIGHT: 73 IN | SYSTOLIC BLOOD PRESSURE: 116 MMHG | DIASTOLIC BLOOD PRESSURE: 67 MMHG | RESPIRATION RATE: 18 BRPM | BODY MASS INDEX: 36.98 KG/M2 | OXYGEN SATURATION: 97 %

## 2018-09-25 PROCEDURE — 6370000000 HC RX 637 (ALT 250 FOR IP): Performed by: INTERNAL MEDICINE

## 2018-09-25 PROCEDURE — 99152 MOD SED SAME PHYS/QHP 5/>YRS: CPT | Performed by: INTERNAL MEDICINE

## 2018-09-25 PROCEDURE — 2709999900 HC NON-CHARGEABLE SUPPLY: Performed by: INTERNAL MEDICINE

## 2018-09-25 PROCEDURE — 7100000010 HC PHASE II RECOVERY - FIRST 15 MIN: Performed by: INTERNAL MEDICINE

## 2018-09-25 PROCEDURE — 6360000002 HC RX W HCPCS: Performed by: INTERNAL MEDICINE

## 2018-09-25 PROCEDURE — 88342 IMHCHEM/IMCYTCHM 1ST ANTB: CPT

## 2018-09-25 PROCEDURE — 88305 TISSUE EXAM BY PATHOLOGIST: CPT

## 2018-09-25 PROCEDURE — 3609012400 HC EGD TRANSORAL BIOPSY SINGLE/MULTIPLE: Performed by: INTERNAL MEDICINE

## 2018-09-25 PROCEDURE — 7100000011 HC PHASE II RECOVERY - ADDTL 15 MIN: Performed by: INTERNAL MEDICINE

## 2018-09-25 RX ORDER — MIDAZOLAM HYDROCHLORIDE 1 MG/ML
INJECTION INTRAMUSCULAR; INTRAVENOUS PRN
Status: DISCONTINUED | OUTPATIENT
Start: 2018-09-25 | End: 2018-09-25 | Stop reason: HOSPADM

## 2018-09-25 RX ORDER — MEPERIDINE HYDROCHLORIDE 50 MG/ML
INJECTION INTRAMUSCULAR; INTRAVENOUS; SUBCUTANEOUS PRN
Status: DISCONTINUED | OUTPATIENT
Start: 2018-09-25 | End: 2018-09-25 | Stop reason: HOSPADM

## 2018-09-25 RX ORDER — DEXLANSOPRAZOLE 30 MG/1
30 CAPSULE, DELAYED RELEASE ORAL DAILY
Status: ON HOLD | COMMUNITY
End: 2019-10-01 | Stop reason: ALTCHOICE

## 2018-09-25 ASSESSMENT — PAIN - FUNCTIONAL ASSESSMENT: PAIN_FUNCTIONAL_ASSESSMENT: 0-10

## 2018-09-25 NOTE — H&P
History and Physical / Pre-Sedation Assessment    Patient:  Lilian Martinez   :   1955     Intended Procedure:  egd    HPI: dyspepsia. Heartburn and abdominal discomfort    CAD  BPH  Hyperlipidemia      Nurses notes reviewed and agreed. Medications reviewed  Allergies: No Known Allergies    Physical Exam:  Vital Signs: BP (!) 162/97   Pulse 62   Temp 98.2 °F (36.8 °C)   Resp 19   Ht 6' 1\" (1.854 m)   Wt 279 lb (126.6 kg)   SpO2 99%   BMI 36.81 kg/m²    Pulmonary:Normal  Cardiac:Normal  Abdomen:Normal    Pre-Procedure Assessment / Plan:  ASA 2 - Patient with mild systemic disease with no functional limitations  Level of Sedation Plan: Moderate sedation  Post Procedure plan: Return to same level of care    I assessed the patient and find that the patient is in satisfactory condition to proceed with the planned procedure and sedation plan. I have explained the risk, benefits, and alternatives to the procedure. The patient understands and agrees to proceed.   Amber Hughes  9:49 AM 2018

## 2018-09-25 NOTE — PROGRESS NOTES
Hold Plavix or Aspirin  til Thursday. No Ibuprofen, Ibuprofen, Motrin, Alleve ie (NSAIDS). Ok to use Tylenol for pain. DEXILANT 60 MG DAILY. JOSEHTER GIVEN WRITTEN INSTRUCTIONS AND VERBALIZED UNDERSTANDING OF THE INFORMATION.

## 2018-10-01 RX ORDER — FUROSEMIDE 80 MG
80 TABLET ORAL DAILY
Qty: 30 TABLET | Refills: 5 | Status: SHIPPED | OUTPATIENT
Start: 2018-10-01 | End: 2018-11-28 | Stop reason: SDUPTHER

## 2018-10-11 RX ORDER — IBUPROFEN 800 MG/1
TABLET ORAL
Qty: 120 TABLET | Refills: 0 | Status: SHIPPED | OUTPATIENT
Start: 2018-10-11 | End: 2019-02-25

## 2018-11-26 RX ORDER — CLOPIDOGREL BISULFATE 75 MG/1
75 TABLET ORAL DAILY
Qty: 90 TABLET | Refills: 3 | Status: SHIPPED | OUTPATIENT
Start: 2018-11-26 | End: 2018-11-28 | Stop reason: SDUPTHER

## 2018-11-28 DIAGNOSIS — I10 ESSENTIAL HYPERTENSION: ICD-10-CM

## 2018-11-28 DIAGNOSIS — I25.10 CORONARY ARTERY DISEASE INVOLVING NATIVE CORONARY ARTERY OF NATIVE HEART WITHOUT ANGINA PECTORIS: ICD-10-CM

## 2018-11-28 DIAGNOSIS — E78.2 MIXED HYPERLIPIDEMIA: ICD-10-CM

## 2018-11-29 RX ORDER — ROSUVASTATIN CALCIUM 10 MG/1
10 TABLET, COATED ORAL NIGHTLY
Qty: 90 TABLET | Refills: 3 | Status: SHIPPED | OUTPATIENT
Start: 2018-11-29 | End: 2019-11-12 | Stop reason: SDUPTHER

## 2018-11-29 RX ORDER — CLOPIDOGREL BISULFATE 75 MG/1
75 TABLET ORAL DAILY
Qty: 90 TABLET | Refills: 3 | Status: SHIPPED | OUTPATIENT
Start: 2018-11-29 | End: 2019-11-20 | Stop reason: SDUPTHER

## 2018-11-29 RX ORDER — FUROSEMIDE 80 MG
80 TABLET ORAL DAILY
Qty: 90 TABLET | Refills: 1 | Status: SHIPPED | OUTPATIENT
Start: 2018-11-29 | End: 2019-04-10 | Stop reason: SDUPTHER

## 2018-12-18 ENCOUNTER — HOSPITAL ENCOUNTER (OUTPATIENT)
Age: 63
Setting detail: OUTPATIENT SURGERY
Discharge: HOME | End: 2018-12-18
Attending: INTERNAL MEDICINE | Admitting: INTERNAL MEDICINE
Payer: COMMERCIAL

## 2018-12-18 VITALS
OXYGEN SATURATION: 98 % | HEIGHT: 73 IN | TEMPERATURE: 97.1 F | HEART RATE: 44 BPM | DIASTOLIC BLOOD PRESSURE: 67 MMHG | WEIGHT: 279 LBS | BODY MASS INDEX: 36.98 KG/M2 | RESPIRATION RATE: 16 BRPM | SYSTOLIC BLOOD PRESSURE: 125 MMHG

## 2018-12-18 PROCEDURE — 7100000011 HC PHASE II RECOVERY - ADDTL 15 MIN: Performed by: INTERNAL MEDICINE

## 2018-12-18 PROCEDURE — 3609012400 HC EGD TRANSORAL BIOPSY SINGLE/MULTIPLE: Performed by: INTERNAL MEDICINE

## 2018-12-18 PROCEDURE — 99152 MOD SED SAME PHYS/QHP 5/>YRS: CPT | Performed by: INTERNAL MEDICINE

## 2018-12-18 PROCEDURE — 6370000000 HC RX 637 (ALT 250 FOR IP): Performed by: INTERNAL MEDICINE

## 2018-12-18 PROCEDURE — 7100000010 HC PHASE II RECOVERY - FIRST 15 MIN: Performed by: INTERNAL MEDICINE

## 2018-12-18 PROCEDURE — 6360000002 HC RX W HCPCS: Performed by: INTERNAL MEDICINE

## 2018-12-18 PROCEDURE — 2709999900 HC NON-CHARGEABLE SUPPLY: Performed by: INTERNAL MEDICINE

## 2018-12-18 PROCEDURE — 88305 TISSUE EXAM BY PATHOLOGIST: CPT

## 2018-12-18 RX ORDER — MEPERIDINE HYDROCHLORIDE 50 MG/ML
INJECTION INTRAMUSCULAR; INTRAVENOUS; SUBCUTANEOUS PRN
Status: DISCONTINUED | OUTPATIENT
Start: 2018-12-18 | End: 2018-12-18 | Stop reason: HOSPADM

## 2018-12-18 RX ORDER — MIDAZOLAM HYDROCHLORIDE 1 MG/ML
INJECTION INTRAMUSCULAR; INTRAVENOUS PRN
Status: DISCONTINUED | OUTPATIENT
Start: 2018-12-18 | End: 2018-12-18 | Stop reason: HOSPADM

## 2018-12-18 ASSESSMENT — PAIN - FUNCTIONAL ASSESSMENT
PAIN_FUNCTIONAL_ASSESSMENT: 0-10

## 2018-12-18 NOTE — PROGRESS NOTES
Zia Samaniego is a 70 y.o. male patient. No current facility-administered medications for this encounter. No Known Allergies  Active Problems:    * No active hospital problems. *  Resolved Problems:    * No resolved hospital problems. *    Blood pressure 126/64, pulse 81, temperature 97.1 °F (36.2 °C), temperature source Oral, resp. rate 18, height 6' 1\" (1.854 m), weight 279 lb (126.6 kg), SpO2 100 %. Subjective:  Symptoms:  Stable. Diet:  Adequate intake. Activity level: Normal.    Pain:  He reports no pain. Objective:  General Appearance:  Comfortable, well-appearing, in no acute distress and not in pain. Vital signs: (most recent): Blood pressure 126/64, pulse 81, temperature 97.1 °F (36.2 °C), temperature source Oral, resp. rate 18, height 6' 1\" (1.854 m), weight 279 lb (126.6 kg), SpO2 100 %. Vital signs are normal.    Output: Producing urine and producing stool. HEENT: Normal HEENT exam.    Lungs:  Normal effort and normal respiratory rate. Heart: Normal rate. Regular rhythm. Abdomen: Abdomen is soft. There is no abdominal tenderness. Extremities: Normal range of motion. Neurological: Patient is alert and oriented to person, place and time. Normal strength. Skin:  Warm and dry.       Assessment & Plan    Mariam Bradshaw RN  12/18/2018

## 2018-12-18 NOTE — PROGRESS NOTES
Dr. Papi Esteban instructed patient to resume Plavix  tomorrow and the aspirin tonight. Pt's daughter repeated the information and  The information was written as a reminder.

## 2019-02-11 ENCOUNTER — OFFICE VISIT (OUTPATIENT)
Dept: FAMILY MEDICINE CLINIC | Age: 64
End: 2019-02-11
Payer: COMMERCIAL

## 2019-02-11 VITALS
HEART RATE: 84 BPM | WEIGHT: 290.6 LBS | OXYGEN SATURATION: 96 % | TEMPERATURE: 97.7 F | BODY MASS INDEX: 39.36 KG/M2 | SYSTOLIC BLOOD PRESSURE: 120 MMHG | HEIGHT: 72 IN | DIASTOLIC BLOOD PRESSURE: 80 MMHG

## 2019-02-11 DIAGNOSIS — I25.10 CORONARY ARTERY DISEASE INVOLVING NATIVE CORONARY ARTERY OF NATIVE HEART WITHOUT ANGINA PECTORIS: ICD-10-CM

## 2019-02-11 DIAGNOSIS — E78.00 HYPERCHOLESTEROLEMIA: ICD-10-CM

## 2019-02-11 DIAGNOSIS — Z13.1 DIABETES MELLITUS SCREENING: ICD-10-CM

## 2019-02-11 DIAGNOSIS — Z11.59 ENCOUNTER FOR HCV SCREENING TEST FOR LOW RISK PATIENT: ICD-10-CM

## 2019-02-11 DIAGNOSIS — I10 ESSENTIAL HYPERTENSION: ICD-10-CM

## 2019-02-11 DIAGNOSIS — E55.9 VITAMIN D DEFICIENCY: ICD-10-CM

## 2019-02-11 DIAGNOSIS — Z00.00 ROUTINE PHYSICAL EXAMINATION: Primary | ICD-10-CM

## 2019-02-11 DIAGNOSIS — R21 RASH: ICD-10-CM

## 2019-02-11 PROCEDURE — 99397 PER PM REEVAL EST PAT 65+ YR: CPT | Performed by: FAMILY MEDICINE

## 2019-02-11 RX ORDER — CLOTRIMAZOLE AND BETAMETHASONE DIPROPIONATE 10; .64 MG/G; MG/G
CREAM TOPICAL
Qty: 45 G | Refills: 1 | Status: SHIPPED | OUTPATIENT
Start: 2019-02-11 | End: 2019-07-19 | Stop reason: SDUPTHER

## 2019-02-11 ASSESSMENT — PATIENT HEALTH QUESTIONNAIRE - PHQ9
1. LITTLE INTEREST OR PLEASURE IN DOING THINGS: 0
SUM OF ALL RESPONSES TO PHQ9 QUESTIONS 1 & 2: 0
2. FEELING DOWN, DEPRESSED OR HOPELESS: 0
SUM OF ALL RESPONSES TO PHQ QUESTIONS 1-9: 0
SUM OF ALL RESPONSES TO PHQ QUESTIONS 1-9: 0

## 2019-02-11 ASSESSMENT — ENCOUNTER SYMPTOMS
NAUSEA: 0
ABDOMINAL PAIN: 0
EYE REDNESS: 0
SORE THROAT: 0
CONSTIPATION: 0
SINUS PAIN: 0
BLOOD IN STOOL: 0
COUGH: 0
COLOR CHANGE: 0
SHORTNESS OF BREATH: 0
EYE PAIN: 0
ABDOMINAL DISTENTION: 0
VOMITING: 0
CHEST TIGHTNESS: 0
EYE ITCHING: 0
TROUBLE SWALLOWING: 0
DIARRHEA: 0
RHINORRHEA: 0
RESPIRATORY NEGATIVE: 1
EYES NEGATIVE: 1
BACK PAIN: 0
APNEA: 0
EYE DISCHARGE: 0
SINUS PRESSURE: 0
CHOKING: 0
WHEEZING: 0
VOICE CHANGE: 0
PHOTOPHOBIA: 0

## 2019-02-13 ENCOUNTER — TELEPHONE (OUTPATIENT)
Dept: FAMILY MEDICINE CLINIC | Age: 64
End: 2019-02-13

## 2019-02-14 LAB
A/G RATIO: 1.6 (CALC) (ref 1–2.5)
ALBUMIN SERPL-MCNC: 4.3 G/DL (ref 3.6–5.1)
ALP BLD-CCNC: 99 U/L (ref 40–115)
ALT SERPL-CCNC: 19 U/L (ref 9–46)
AST SERPL-CCNC: 20 U/L (ref 10–35)
BASOPHILS ABSOLUTE: 21 CELLS/UL (ref 0–200)
BASOPHILS RELATIVE PERCENT: 0.4 %
BILIRUB SERPL-MCNC: 0.6 MG/DL (ref 0.2–1.2)
BUN / CREAT RATIO: ABNORMAL (CALC) (ref 6–22)
BUN BLDV-MCNC: 14 MG/DL (ref 7–25)
CALCIUM SERPL-MCNC: 9.4 MG/DL (ref 8.6–10.3)
CHLORIDE BLD-SCNC: 102 MMOL/L (ref 98–110)
CHOLESTEROL, TOTAL: 206 MG/DL
CHOLESTEROL/HDL RATIO: 3 (CALC)
CHOLESTEROL: 137 MG/DL (CALC)
CO2: 30 MMOL/L (ref 20–32)
CREAT SERPL-MCNC: 0.92 MG/DL (ref 0.7–1.25)
EOSINOPHILS ABSOLUTE: 109 CELLS/UL (ref 15–500)
EOSINOPHILS RELATIVE PERCENT: 2.1 %
GFR AFRICAN AMERICAN: 101 ML/MIN/1.73M2
GFR, ESTIMATED: 87 ML/MIN/1.73M2
GLOBULIN: 2.7 G/DL (CALC) (ref 1.9–3.7)
GLUCOSE BLD-MCNC: 115 MG/DL (ref 65–99)
HBA1C MFR BLD: 5.5 % OF TOTAL HGB
HCT VFR BLD CALC: 42.6 % (ref 38.5–50)
HDLC SERPL-MCNC: 69 MG/DL
HEMOGLOBIN: 14.5 G/DL (ref 13.2–17.1)
HEPATITIS C ANTIBODY: NORMAL
HEPATITIS C VIRUS AB SIGNAL/CU: 0.02
LDL CHOLESTEROL CALCULATED: 115 MG/DL (CALC)
LYMPHOCYTES ABSOLUTE: 1607 CELLS/UL (ref 850–3900)
LYMPHOCYTES RELATIVE PERCENT: 30.9 %
MCH RBC QN AUTO: 30 PG (ref 27–33)
MCHC RBC AUTO-ENTMCNC: 34 G/DL (ref 32–36)
MCV RBC AUTO: 88 FL (ref 80–100)
MONOCYTES ABSOLUTE: 406 CELLS/UL (ref 200–950)
MONOCYTES RELATIVE PERCENT: 7.8 %
NEUTROPHILS ABSOLUTE: 3058 CELLS/UL (ref 1500–7800)
PDW BLD-RTO: 12.8 % (ref 11–15)
PLATELET # BLD: 232 THOUSAND/UL (ref 140–400)
PMV BLD AUTO: 11.1 FL (ref 7.5–12.5)
POTASSIUM SERPL-SCNC: 3.7 MMOL/L (ref 3.5–5.3)
RBC # BLD: 4.84 MILLION/UL (ref 4.2–5.8)
SEGMENTED NEUTROPHILS RELATIVE PERCENT: 58.8 %
SODIUM BLD-SCNC: 140 MMOL/L (ref 135–146)
TOTAL PROTEIN: 7 G/DL (ref 6.1–8.1)
TRIGL SERPL-MCNC: 111 MG/DL
TSH ULTRASENSITIVE: 2.1 MIU/L (ref 0.4–4.5)
VITAMIN D 25-HYDROXY: 18 NG/ML (ref 30–100)
WBC # BLD: 5.2 THOUSAND/UL (ref 3.8–10.8)

## 2019-02-15 DIAGNOSIS — E55.9 VITAMIN D DEFICIENCY: Primary | ICD-10-CM

## 2019-02-15 RX ORDER — ERGOCALCIFEROL 1.25 MG/1
50000 CAPSULE ORAL WEEKLY
Qty: 4 CAPSULE | Refills: 5 | Status: SHIPPED | OUTPATIENT
Start: 2019-02-15 | End: 2020-01-22

## 2019-02-25 ENCOUNTER — TELEPHONE (OUTPATIENT)
Dept: CARDIOLOGY CLINIC | Age: 64
End: 2019-02-25

## 2019-02-25 ENCOUNTER — OFFICE VISIT (OUTPATIENT)
Dept: FAMILY MEDICINE CLINIC | Age: 64
End: 2019-02-25
Payer: COMMERCIAL

## 2019-02-25 VITALS
OXYGEN SATURATION: 98 % | SYSTOLIC BLOOD PRESSURE: 120 MMHG | WEIGHT: 285 LBS | DIASTOLIC BLOOD PRESSURE: 70 MMHG | BODY MASS INDEX: 38.6 KG/M2 | HEART RATE: 63 BPM | TEMPERATURE: 97.7 F | HEIGHT: 72 IN | RESPIRATION RATE: 16 BRPM

## 2019-02-25 DIAGNOSIS — I10 ESSENTIAL HYPERTENSION: ICD-10-CM

## 2019-02-25 DIAGNOSIS — M65.342 TRIGGER RING FINGER OF LEFT HAND: ICD-10-CM

## 2019-02-25 DIAGNOSIS — Z01.818 PRE-OP EXAMINATION: Primary | ICD-10-CM

## 2019-02-25 DIAGNOSIS — E78.00 HYPERCHOLESTEROLEMIA: ICD-10-CM

## 2019-02-25 PROCEDURE — 99242 OFF/OP CONSLTJ NEW/EST SF 20: CPT | Performed by: FAMILY MEDICINE

## 2019-02-25 PROCEDURE — 93000 ELECTROCARDIOGRAM COMPLETE: CPT | Performed by: FAMILY MEDICINE

## 2019-02-25 ASSESSMENT — ENCOUNTER SYMPTOMS
DIARRHEA: 0
CHEST TIGHTNESS: 0
CONSTIPATION: 0
ABDOMINAL DISTENTION: 0
COLOR CHANGE: 0
BACK PAIN: 0
SINUS PRESSURE: 0
ABDOMINAL PAIN: 0
SHORTNESS OF BREATH: 0
BLOOD IN STOOL: 0
TROUBLE SWALLOWING: 0
PHOTOPHOBIA: 0
EYE ITCHING: 0
RHINORRHEA: 0
APNEA: 0
VOMITING: 0
WHEEZING: 0
SINUS PAIN: 0
SORE THROAT: 0
EYE REDNESS: 0
EYE DISCHARGE: 0
VOICE CHANGE: 0
EYE PAIN: 0
NAUSEA: 0
COUGH: 0
CHOKING: 0

## 2019-02-28 ENCOUNTER — OFFICE VISIT (OUTPATIENT)
Dept: CARDIOLOGY CLINIC | Age: 64
End: 2019-02-28
Payer: COMMERCIAL

## 2019-02-28 VITALS
BODY MASS INDEX: 39.79 KG/M2 | DIASTOLIC BLOOD PRESSURE: 70 MMHG | HEART RATE: 61 BPM | WEIGHT: 293.4 LBS | SYSTOLIC BLOOD PRESSURE: 110 MMHG

## 2019-02-28 DIAGNOSIS — I25.10 CORONARY ARTERY DISEASE INVOLVING NATIVE CORONARY ARTERY OF NATIVE HEART WITHOUT ANGINA PECTORIS: Primary | ICD-10-CM

## 2019-02-28 PROCEDURE — 99213 OFFICE O/P EST LOW 20 MIN: CPT | Performed by: INTERNAL MEDICINE

## 2019-02-28 PROCEDURE — 93000 ELECTROCARDIOGRAM COMPLETE: CPT | Performed by: INTERNAL MEDICINE

## 2019-03-18 ENCOUNTER — TELEPHONE (OUTPATIENT)
Dept: FAMILY MEDICINE CLINIC | Age: 64
End: 2019-03-18

## 2019-04-01 ENCOUNTER — TELEPHONE (OUTPATIENT)
Dept: CARDIOLOGY CLINIC | Age: 64
End: 2019-04-01

## 2019-04-01 NOTE — TELEPHONE ENCOUNTER
Trudi Minaya, 1955    Cardiac Risk Assessment    What type of procedure are you having?: Fusion Prostate Biopsy     When is your procedure scheduled for?: 04/15/19    What physician is performing your procedure?: Dr. Akua Gooden     Phone Number: [phone]    Fax number to send the letter: [phone] (Attn: Amelia Martin)    Cardiologist: Dr. Viviann Holstein     Last Appointment: 02/28/19    Next Appointment: 08/29/19    Are you on any blood thinners?: Aspirin 81 Mg and Plavix 75 Mg (Needs to hold 7 days prior)    History of Coronary Artery Disease:    Last stress test:    Last echo: 11/15/17    Device Type and :

## 2019-04-10 ENCOUNTER — OFFICE VISIT (OUTPATIENT)
Dept: FAMILY MEDICINE CLINIC | Age: 64
End: 2019-04-10
Payer: COMMERCIAL

## 2019-04-10 VITALS
WEIGHT: 279.6 LBS | HEART RATE: 70 BPM | BODY MASS INDEX: 37.87 KG/M2 | OXYGEN SATURATION: 98 % | HEIGHT: 72 IN | DIASTOLIC BLOOD PRESSURE: 64 MMHG | SYSTOLIC BLOOD PRESSURE: 108 MMHG

## 2019-04-10 DIAGNOSIS — E78.00 HYPERCHOLESTEROLEMIA: ICD-10-CM

## 2019-04-10 DIAGNOSIS — Z01.818 PRE-OP EXAMINATION: Primary | ICD-10-CM

## 2019-04-10 DIAGNOSIS — I10 ESSENTIAL HYPERTENSION: ICD-10-CM

## 2019-04-10 DIAGNOSIS — N42.9 PROSTATE DISORDER: ICD-10-CM

## 2019-04-10 PROCEDURE — 99242 OFF/OP CONSLTJ NEW/EST SF 20: CPT | Performed by: FAMILY MEDICINE

## 2019-04-10 ASSESSMENT — ENCOUNTER SYMPTOMS
SORE THROAT: 0
ABDOMINAL PAIN: 0
CONSTIPATION: 0
VOICE CHANGE: 0
SHORTNESS OF BREATH: 0
RHINORRHEA: 0
SINUS PAIN: 0
EYE DISCHARGE: 0
SINUS PRESSURE: 0
EYE PAIN: 0
VOMITING: 0
EYE ITCHING: 0
CHOKING: 0
COUGH: 0
COLOR CHANGE: 0
PHOTOPHOBIA: 0
CHEST TIGHTNESS: 0
APNEA: 0
BLOOD IN STOOL: 0
ABDOMINAL DISTENTION: 0
WHEEZING: 0
DIARRHEA: 0
EYE REDNESS: 0
TROUBLE SWALLOWING: 0
BACK PAIN: 0
NAUSEA: 0

## 2019-04-10 NOTE — TELEPHONE ENCOUNTER
Requested Prescriptions     Pending Prescriptions Disp Refills    furosemide (LASIX) 80 MG tablet [Pharmacy Med Name: FUROSEMIDE  80MG  TAB] 90 tablet 1     Sig: TAKE 1 TABLET BY MOUTH  DAILY         Last ov:2/28/19    Next ov:8/29/19    Last fill:12/29/18    Last labs:2/13/19

## 2019-04-10 NOTE — PROGRESS NOTES
SUBJECTIVE:  Patient ID: Radha Uribe is a 70 y.o. male.   Chief Complaint:  Chief Complaint   Patient presents with    Pre-op Exam     prostate bx Emili Carrion MD [facility]       HPI   70year old Male  Dx BPH  [facility] Dr Emili Carrion     Past Medical History:   Diagnosis Date    CAD (coronary artery disease)     Hyperlipidemia     Hypertension     S/P colonoscopy sept 2010    BN    Shoulder injury     Left /Dr Kamilah Gonzalez (football)    Torn meniscus     LT /Dr Kamilah Gonzalez (football)     Past Surgical History:   Procedure Laterality Date    BACK SURGERY  2014    CORONARY ANGIOPLASTY WITH STENT PLACEMENT  2006    x2    HAND SURGERY  2005,2010    thumb ,nando trigger    HEMORRHOID SURGERY  2010    KNEE SURGERY  [id number],[id number],9135    RT x 3 one scope & 2 major    UPPER GASTROINTESTINAL ENDOSCOPY N/A 9/25/2018    EGD BIOPSY GASTRIC FOR H PYLORI [facility] Forrester [facility] [employer] N/A 12/18/2018    EGD BIOPSY performed by Andi Forrester MD at [facility] ENDOSCOPY   No Known Allergies  Family History   Problem Relation Age of Onset    Cancer Mother         Ovarian ca @69    Other Father         age 79 +respiratory Failure /mine work       Social History     Tobacco History     Smoking Status  Never Smoker    Smokeless Tobacco Use  Never Used          Alcohol History     Alcohol Use Status  No          Drug Use     Drug Use Status  No          Sexual Activity     Sexually Active  Yes Partners  Female Comment  M 2 girls 28 & 25                  Patient Active Problem List   Diagnosis    CAD (coronary artery disease)    Hypercholesterolemia    BPH (benign prostatic hyperplasia)    Marciana Corns cyst ([facility] [address]    Other tenosynovitis of hand and wrist    Hypertension    Localized edema     Current Outpatient Medications   Medication Sig Dispense Refill    TURMERIC PO Take 40 mg by mouth 2 times daily      Cholecalciferol (VITAMIN D PO) Take by mouth daily      Specialty Vitamins Products (PROSTATE PO) Take 40 mg by mouth 2 times daily      Cranberry 600 MG TABS Take by mouth 3 times daily      vitamin D (ERGOCALCIFEROL) 31087 units CAPS capsule Take 1 capsule by mouth once a week 4 capsule 5    clotrimazole-betamethasone (LOTRISONE) 1-0.05 % cream APPLY TOPICALLY 2 TIMES DAILY. 45 g 1    clopidogrel (PLAVIX) 75 MG tablet Take 1 tablet by mouth daily 90 tablet 3    rosuvastatin (CRESTOR) 10 MG tablet Take 1 tablet by mouth nightly 90 tablet 3    dexlansoprazole (DEXILANT) 30 MG CPDR delayed release capsule Take 30 mg by mouth daily      metoprolol succinate (TOPROL XL) 50 MG extended release tablet TAKE 1 TABLET BY MOUTH DAILY. 90 tablet 3    potassium chloride (KLOR-CON M20) 20 MEQ extended release tablet Take 1 tablet by mouth daily 90 tablet 3    aspirin 81 MG tablet Take 81 mg by mouth daily      tamsulosin (FLOMAX) 0.4 MG capsule Take 0.4 mg by mouth 2 times daily.  furosemide (LASIX) 80 MG tablet Take 1 tablet by mouth daily 90 tablet 1     No current facility-administered medications for this visit.       Lab Results   Component Value Date    WBC 5.2 02/13/2019    HGB 14.5 02/13/2019    HCT 42.6 02/13/2019    MCV 88.0 02/13/2019     02/13/2019     Lab Results   Component Value Date    CHOL 206 (H) 02/13/2019    CHOL 137 (H) 02/13/2019    CHOL 169 08/22/2018     Lab Results   Component Value Date    TRIG 111 02/13/2019    TRIG 67 08/22/2018    TRIG 102 11/20/2017     Lab Results   Component Value Date    HDL 69 02/13/2019    HDL 62 (H) 08/22/2018    HDL 56 11/20/2017     Lab Results   Component Value Date    LDLCALC 115 (H) 02/13/2019    LDLCALC 94 08/22/2018    LDLCALC 136 (H) 11/20/2017     Lab Results   Component Value Date    LABVLDL 13 08/22/2018    LABVLDL 20 11/20/2017    LABVLDL 11 02/09/2015     Lab Results   Component Value Date    CHOLHDLRATIO 3.0 02/13/2019    CHOLHDLRATIO 2.6 09/07/2017    CHOLHDLRATIO 2.7 tremors, light-headedness, numbness and headaches. Psychiatric/Behavioral: Negative for agitation, behavioral problems, decreased concentration and sleep disturbance. The patient is not hyperactive. OBJECTIVE:  /64 (Site: Left Upper Arm, Position: Sitting, Cuff Size: Medium Adult)   Pulse 70   Ht 5' 11.5\" (1.816 m)   Wt 279 lb 9.6 oz (126.8 kg)   SpO2 98%   BMI 38.45 kg/m²   Physical Exam   Constitutional: He is oriented to person, place, and time. He appears well-developed and well-nourished. HENT:   Head: Normocephalic. Right Ear: External ear normal.   Left Ear: External ear normal.   Nose: Nose normal.   Mouth/Throat: Oropharynx is clear and moist. No oropharyngeal exudate. Eyes: Pupils are equal, round, and reactive to light. Conjunctivae and EOM are normal.   Neck: Normal range of motion. Neck supple. Cardiovascular: Normal rate, regular rhythm and normal heart sounds. No murmur heard. EKG done 2-2019    Pulmonary/Chest: Effort normal and breath sounds normal.   Abdominal: Soft. Bowel sounds are normal.   Musculoskeletal: Normal range of motion. Neurological: He is alert and oriented to person, place, and time. He has normal reflexes. Skin: Skin is warm. Psychiatric: He has a normal mood and affect. His behavior is normal. Judgment and thought content normal.   Vitals reviewed. ASSESSMENT/PLAN:   Diagnosis Orders   1. Pre-op examination     2. Prostate disorder     3. Essential hypertension     4.  Hypercholesterolemia            OK for planned surgery Prostate Biopsy By Dr Morena Rodriguez  4-   Surgery was explained by surgeon  Pt is aware of possible risk & complications  Antibiotic prophylaxis as per surgeon  Pt did stop Plavix & ASA 4-7-2019  Fax [phone]

## 2019-04-12 RX ORDER — FUROSEMIDE 80 MG
80 TABLET ORAL DAILY
Qty: 90 TABLET | Refills: 2 | Status: ON HOLD | OUTPATIENT
Start: 2019-04-12 | End: 2021-04-23

## 2019-07-03 RX ORDER — POTASSIUM CHLORIDE 20 MEQ/1
20 TABLET, EXTENDED RELEASE ORAL DAILY
Qty: 90 TABLET | Refills: 1 | Status: SHIPPED | OUTPATIENT
Start: 2019-07-03 | End: 2020-01-24

## 2019-07-03 RX ORDER — METOPROLOL SUCCINATE 50 MG/1
TABLET, EXTENDED RELEASE ORAL
Qty: 90 TABLET | Refills: 1 | Status: SHIPPED | OUTPATIENT
Start: 2019-07-03 | End: 2020-01-24

## 2019-07-19 ENCOUNTER — OFFICE VISIT (OUTPATIENT)
Dept: FAMILY MEDICINE CLINIC | Age: 64
End: 2019-07-19
Payer: COMMERCIAL

## 2019-07-19 VITALS
WEIGHT: 278.2 LBS | DIASTOLIC BLOOD PRESSURE: 72 MMHG | BODY MASS INDEX: 37.68 KG/M2 | SYSTOLIC BLOOD PRESSURE: 110 MMHG | HEART RATE: 58 BPM | TEMPERATURE: 98.4 F | OXYGEN SATURATION: 98 % | HEIGHT: 72 IN

## 2019-07-19 DIAGNOSIS — Z01.818 PREOPERATIVE CLEARANCE: Primary | ICD-10-CM

## 2019-07-19 DIAGNOSIS — R21 RASH: ICD-10-CM

## 2019-07-19 DIAGNOSIS — M67.949 DISORDER OF TENDON IN FINGER: ICD-10-CM

## 2019-07-19 PROCEDURE — 99242 OFF/OP CONSLTJ NEW/EST SF 20: CPT | Performed by: FAMILY MEDICINE

## 2019-07-19 RX ORDER — CLOTRIMAZOLE AND BETAMETHASONE DIPROPIONATE 10; .64 MG/G; MG/G
CREAM TOPICAL
Qty: 45 G | Refills: 1 | Status: SHIPPED | OUTPATIENT
Start: 2019-07-19 | End: 2021-11-08 | Stop reason: SDUPTHER

## 2019-07-19 RX ORDER — MUPIROCIN CALCIUM 20 MG/G
CREAM TOPICAL
Qty: 30 G | Refills: 0 | Status: SHIPPED | OUTPATIENT
Start: 2019-07-19 | End: 2019-08-18

## 2019-07-19 ASSESSMENT — ENCOUNTER SYMPTOMS
EYE REDNESS: 0
APNEA: 0
EYE DISCHARGE: 0
EYES NEGATIVE: 1
BLOOD IN STOOL: 0
COUGH: 0
ABDOMINAL PAIN: 0
SINUS PAIN: 0
PHOTOPHOBIA: 0
WHEEZING: 0
VOMITING: 0
DIARRHEA: 0
EYE PAIN: 0
CHOKING: 0
SORE THROAT: 0
RHINORRHEA: 0
VOICE CHANGE: 0
ABDOMINAL DISTENTION: 0
CHEST TIGHTNESS: 0
SHORTNESS OF BREATH: 0
BACK PAIN: 0
CONSTIPATION: 0
NAUSEA: 0
COLOR CHANGE: 0
TROUBLE SWALLOWING: 0
EYE ITCHING: 0
SINUS PRESSURE: 0

## 2019-07-19 NOTE — PROGRESS NOTES
finger          OK for planned surgery by Dr Johansen Nurse for trigger finger Mid Left hand on 7-   Surgery was explained by surgeon  Pt is aware of possible risk & complications

## 2019-07-24 ENCOUNTER — TELEPHONE (OUTPATIENT)
Dept: CARDIOLOGY CLINIC | Age: 64
End: 2019-07-24

## 2019-07-24 ENCOUNTER — TELEPHONE (OUTPATIENT)
Dept: FAMILY MEDICINE CLINIC | Age: 64
End: 2019-07-24

## 2019-07-24 DIAGNOSIS — Z01.818 PREOPERATIVE CLEARANCE: Primary | ICD-10-CM

## 2019-07-24 DIAGNOSIS — I10 ESSENTIAL HYPERTENSION: ICD-10-CM

## 2019-07-26 DIAGNOSIS — Z11.59 ENCOUNTER FOR HCV SCREENING TEST FOR LOW RISK PATIENT: ICD-10-CM

## 2019-07-26 DIAGNOSIS — E55.9 VITAMIN D DEFICIENCY: ICD-10-CM

## 2019-07-26 DIAGNOSIS — Z01.818 PREOPERATIVE CLEARANCE: ICD-10-CM

## 2019-07-26 DIAGNOSIS — Z00.00 ROUTINE PHYSICAL EXAMINATION: ICD-10-CM

## 2019-07-26 DIAGNOSIS — E78.00 HYPERCHOLESTEROLEMIA: ICD-10-CM

## 2019-07-26 DIAGNOSIS — I10 ESSENTIAL HYPERTENSION: ICD-10-CM

## 2019-07-26 LAB
A/G RATIO: 2.1 (ref 1.1–2.2)
ALBUMIN SERPL-MCNC: 4.2 G/DL (ref 3.4–5)
ALP BLD-CCNC: 86 U/L (ref 40–129)
ALT SERPL-CCNC: 14 U/L (ref 10–40)
ANION GAP SERPL CALCULATED.3IONS-SCNC: 13 MMOL/L (ref 3–16)
AST SERPL-CCNC: 18 U/L (ref 15–37)
BASOPHILS ABSOLUTE: 0 K/UL (ref 0–0.2)
BASOPHILS RELATIVE PERCENT: 0.4 %
BILIRUB SERPL-MCNC: 0.5 MG/DL (ref 0–1)
BUN BLDV-MCNC: 13 MG/DL (ref 7–20)
CALCIUM SERPL-MCNC: 9.4 MG/DL (ref 8.3–10.6)
CHLORIDE BLD-SCNC: 103 MMOL/L (ref 99–110)
CHOLESTEROL, TOTAL: 166 MG/DL (ref 0–199)
CO2: 26 MMOL/L (ref 21–32)
CREAT SERPL-MCNC: 0.9 MG/DL (ref 0.8–1.3)
EOSINOPHILS ABSOLUTE: 0.1 K/UL (ref 0–0.6)
EOSINOPHILS RELATIVE PERCENT: 2.1 %
ESTIMATED AVERAGE GLUCOSE: 116.9 MG/DL
GFR AFRICAN AMERICAN: >60
GFR NON-AFRICAN AMERICAN: >60
GLOBULIN: 2 G/DL
GLUCOSE BLD-MCNC: 97 MG/DL (ref 70–99)
HBA1C MFR BLD: 5.7 %
HCT VFR BLD CALC: 40.3 % (ref 40.5–52.5)
HDLC SERPL-MCNC: 65 MG/DL (ref 40–60)
HEMOGLOBIN: 13.5 G/DL (ref 13.5–17.5)
HEPATITIS C ANTIBODY INTERPRETATION: NORMAL
LDL CHOLESTEROL CALCULATED: 86 MG/DL
LYMPHOCYTES ABSOLUTE: 1.4 K/UL (ref 1–5.1)
LYMPHOCYTES RELATIVE PERCENT: 31.1 %
MCH RBC QN AUTO: 31.1 PG (ref 26–34)
MCHC RBC AUTO-ENTMCNC: 33.5 G/DL (ref 31–36)
MCV RBC AUTO: 92.8 FL (ref 80–100)
MONOCYTES ABSOLUTE: 0.3 K/UL (ref 0–1.3)
MONOCYTES RELATIVE PERCENT: 7.7 %
NEUTROPHILS ABSOLUTE: 2.6 K/UL (ref 1.7–7.7)
NEUTROPHILS RELATIVE PERCENT: 58.7 %
PDW BLD-RTO: 12.8 % (ref 12.4–15.4)
PLATELET # BLD: 198 K/UL (ref 135–450)
PMV BLD AUTO: 9.1 FL (ref 5–10.5)
POTASSIUM SERPL-SCNC: 4.1 MMOL/L (ref 3.5–5.1)
RBC # BLD: 4.34 M/UL (ref 4.2–5.9)
SODIUM BLD-SCNC: 142 MMOL/L (ref 136–145)
TOTAL PROTEIN: 6.2 G/DL (ref 6.4–8.2)
TRIGL SERPL-MCNC: 74 MG/DL (ref 0–150)
TSH SERPL DL<=0.05 MIU/L-ACNC: 2.05 UIU/ML (ref 0.27–4.2)
VITAMIN D 25-HYDROXY: 37.2 NG/ML
VLDLC SERPL CALC-MCNC: 15 MG/DL
WBC # BLD: 4.4 K/UL (ref 4–11)

## 2019-07-29 ENCOUNTER — TELEPHONE (OUTPATIENT)
Dept: PRIMARY CARE CLINIC | Age: 64
End: 2019-07-29

## 2019-07-29 ENCOUNTER — NURSE ONLY (OUTPATIENT)
Dept: PRIMARY CARE CLINIC | Age: 64
End: 2019-07-29
Payer: COMMERCIAL

## 2019-07-29 DIAGNOSIS — Z01.818 PREOP EXAMINATION: Primary | ICD-10-CM

## 2019-07-29 PROCEDURE — 93000 ELECTROCARDIOGRAM COMPLETE: CPT | Performed by: FAMILY MEDICINE

## 2019-08-29 ENCOUNTER — OFFICE VISIT (OUTPATIENT)
Dept: CARDIOLOGY CLINIC | Age: 64
End: 2019-08-29
Payer: COMMERCIAL

## 2019-08-29 VITALS
DIASTOLIC BLOOD PRESSURE: 70 MMHG | HEART RATE: 60 BPM | SYSTOLIC BLOOD PRESSURE: 102 MMHG | BODY MASS INDEX: 38.1 KG/M2 | WEIGHT: 277 LBS

## 2019-08-29 DIAGNOSIS — I10 ESSENTIAL HYPERTENSION: ICD-10-CM

## 2019-08-29 DIAGNOSIS — I25.10 CORONARY ARTERY DISEASE INVOLVING NATIVE CORONARY ARTERY OF NATIVE HEART WITHOUT ANGINA PECTORIS: ICD-10-CM

## 2019-08-29 DIAGNOSIS — E78.2 MIXED HYPERLIPIDEMIA: Primary | ICD-10-CM

## 2019-08-29 PROCEDURE — 99213 OFFICE O/P EST LOW 20 MIN: CPT | Performed by: INTERNAL MEDICINE
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
## 2019-09-03 ENCOUNTER — HOSPITAL ENCOUNTER (OUTPATIENT)
Age: 64
Setting detail: OUTPATIENT SURGERY
Discharge: HOME | End: 2019-09-03
Attending: INTERNAL MEDICINE | Admitting: INTERNAL MEDICINE
Payer: COMMERCIAL

## 2019-09-03 VITALS
HEIGHT: 73 IN | HEART RATE: 60 BPM | OXYGEN SATURATION: 98 % | SYSTOLIC BLOOD PRESSURE: 109 MMHG | DIASTOLIC BLOOD PRESSURE: 61 MMHG | TEMPERATURE: 98 F | WEIGHT: 268 LBS | RESPIRATION RATE: 16 BRPM | BODY MASS INDEX: 35.52 KG/M2

## 2019-09-03 PROCEDURE — 7100000010 HC PHASE II RECOVERY - FIRST 15 MIN: Performed by: INTERNAL MEDICINE

## 2019-09-03 PROCEDURE — 99152 MOD SED SAME PHYS/QHP 5/>YRS: CPT | Performed by: INTERNAL MEDICINE

## 2019-09-03 PROCEDURE — 99153 MOD SED SAME PHYS/QHP EA: CPT | Performed by: INTERNAL MEDICINE

## 2019-09-03 PROCEDURE — 3609012400 HC EGD TRANSORAL BIOPSY SINGLE/MULTIPLE: Performed by: INTERNAL MEDICINE

## 2019-09-03 PROCEDURE — 2500000003 HC RX 250 WO HCPCS: Performed by: INTERNAL MEDICINE

## 2019-09-03 PROCEDURE — 3609010300 HC COLONOSCOPY W/BIOPSY SINGLE/MULTIPLE: Performed by: INTERNAL MEDICINE

## 2019-09-03 PROCEDURE — 6360000002 HC RX W HCPCS: Performed by: INTERNAL MEDICINE

## 2019-09-03 PROCEDURE — 6370000000 HC RX 637 (ALT 250 FOR IP): Performed by: INTERNAL MEDICINE

## 2019-09-03 PROCEDURE — 2709999900 HC NON-CHARGEABLE SUPPLY: Performed by: INTERNAL MEDICINE

## 2019-09-03 PROCEDURE — 88305 TISSUE EXAM BY PATHOLOGIST: CPT

## 2019-09-03 PROCEDURE — 7100000011 HC PHASE II RECOVERY - ADDTL 15 MIN: Performed by: INTERNAL MEDICINE

## 2019-09-03 RX ORDER — MEPERIDINE HYDROCHLORIDE 25 MG/ML
INJECTION INTRAMUSCULAR; INTRAVENOUS; SUBCUTANEOUS PRN
Status: DISCONTINUED | OUTPATIENT
Start: 2019-09-03 | End: 2019-09-03 | Stop reason: ALTCHOICE

## 2019-09-03 RX ORDER — MIDAZOLAM HYDROCHLORIDE 1 MG/ML
INJECTION INTRAMUSCULAR; INTRAVENOUS PRN
Status: DISCONTINUED | OUTPATIENT
Start: 2019-09-03 | End: 2019-09-03 | Stop reason: ALTCHOICE

## 2019-09-03 RX ORDER — FUROSEMIDE 80 MG
80 TABLET ORAL DAILY
COMMUNITY
End: 2020-02-11

## 2019-09-03 ASSESSMENT — PAIN - FUNCTIONAL ASSESSMENT
PAIN_FUNCTIONAL_ASSESSMENT: 0-10
PAIN_FUNCTIONAL_ASSESSMENT: 0-10
PAIN_FUNCTIONAL_ASSESSMENT: FACES
PAIN_FUNCTIONAL_ASSESSMENT: 0-10
PAIN_FUNCTIONAL_ASSESSMENT: FACES
PAIN_FUNCTIONAL_ASSESSMENT: 0-10
PAIN_FUNCTIONAL_ASSESSMENT: FACES
PAIN_FUNCTIONAL_ASSESSMENT: 0-10
PAIN_FUNCTIONAL_ASSESSMENT: FACES
PAIN_FUNCTIONAL_ASSESSMENT: 0-10

## 2019-09-03 NOTE — H&P
(ERGOCALCIFEROL) 61911 units CAPS capsule Take 1 capsule by mouth once a week 2/15/19   Prudence Vance MD   clopidogrel (PLAVIX) 75 MG tablet Take 1 tablet by mouth daily 11/29/18   Isabel Manley MD   dexlansoprazole (DEXILANT) 30 MG CPDR delayed release capsule Take 30 mg by mouth daily    Historical Provider, MD   aspirin 81 MG tablet Take 81 mg by mouth daily    Historical Provider, MD       Family History:  family history includes Cancer in his mother; Other in his father. Social History:   reports that he has never smoked. He has never used smokeless tobacco. He reports that he does not drink alcohol or use drugs. Allergies:  Patient has no known allergies. ROS:  twelve point system review was unremarkable except for above noted history. Nurses notes reviewed and agreed. Medications reviewed    Physical Exam:  Vital Signs: BP (!) 121/59   Pulse 78   Temp 98 °F (36.7 °C)   Resp 18   Ht 6' 1\" (1.854 m)   Wt 268 lb (121.6 kg)   BMI 35.36 kg/m²    Skin: normal  HEENT: normal  Neck: supple. No adenopathy. No thyromegaly. No JVD. Pulmonary:Normal  Cardiac:Normal  Abdomen:Normal  MS: normal  Neuro: normal  Ext: no edema. Pulses normal    Pre-Procedure Assessment / Plan:  ASA 2 - Patient with mild systemic disease with no functional limitations  Mallampati Airway Assessment:  Mallampati Class I - (soft palate, fauces, uvula & anterior/posterior tonsillar pillars are visible)  Level of Sedation Plan: Moderate sedation  Post Procedure plan: Return to same level of care    I assessed the patient and find that the patient is in satisfactory condition to proceed with the planned procedure and sedation plan. I have explained the risk, benefits, and alternatives to the procedure. The patient understands and agrees to proceed.   Regan Ferrara  10:18 AM 9/3/2019

## 2019-10-01 ENCOUNTER — HOSPITAL ENCOUNTER (OUTPATIENT)
Age: 64
Setting detail: OUTPATIENT SURGERY
Discharge: HOME | End: 2019-10-01
Attending: INTERNAL MEDICINE | Admitting: INTERNAL MEDICINE
Payer: COMMERCIAL

## 2019-10-01 VITALS
HEART RATE: 52 BPM | RESPIRATION RATE: 20 BRPM | BODY MASS INDEX: 35.65 KG/M2 | HEIGHT: 73 IN | SYSTOLIC BLOOD PRESSURE: 129 MMHG | WEIGHT: 269 LBS | OXYGEN SATURATION: 99 % | DIASTOLIC BLOOD PRESSURE: 77 MMHG | TEMPERATURE: 98.1 F

## 2019-10-01 PROCEDURE — 2709999900 HC NON-CHARGEABLE SUPPLY: Performed by: INTERNAL MEDICINE

## 2019-10-01 PROCEDURE — 99152 MOD SED SAME PHYS/QHP 5/>YRS: CPT | Performed by: INTERNAL MEDICINE

## 2019-10-01 PROCEDURE — 3609014000 HC ENTEROSCOPY BIOPSY: Performed by: INTERNAL MEDICINE

## 2019-10-01 PROCEDURE — 6360000002 HC RX W HCPCS: Performed by: INTERNAL MEDICINE

## 2019-10-01 PROCEDURE — 7100000010 HC PHASE II RECOVERY - FIRST 15 MIN: Performed by: INTERNAL MEDICINE

## 2019-10-01 PROCEDURE — 88305 TISSUE EXAM BY PATHOLOGIST: CPT

## 2019-10-01 PROCEDURE — 6370000000 HC RX 637 (ALT 250 FOR IP): Performed by: INTERNAL MEDICINE

## 2019-10-01 PROCEDURE — 7100000011 HC PHASE II RECOVERY - ADDTL 15 MIN: Performed by: INTERNAL MEDICINE

## 2019-10-01 RX ORDER — MEPERIDINE HYDROCHLORIDE 50 MG/ML
INJECTION INTRAMUSCULAR; INTRAVENOUS; SUBCUTANEOUS PRN
Status: DISCONTINUED | OUTPATIENT
Start: 2019-10-01 | End: 2019-10-01 | Stop reason: ALTCHOICE

## 2019-10-01 RX ORDER — MIDAZOLAM HYDROCHLORIDE 5 MG/ML
INJECTION INTRAMUSCULAR; INTRAVENOUS PRN
Status: DISCONTINUED | OUTPATIENT
Start: 2019-10-01 | End: 2019-10-01 | Stop reason: ALTCHOICE

## 2019-10-01 ASSESSMENT — PAIN - FUNCTIONAL ASSESSMENT
PAIN_FUNCTIONAL_ASSESSMENT: 0-10

## 2019-11-12 DIAGNOSIS — I25.10 CORONARY ARTERY DISEASE INVOLVING NATIVE CORONARY ARTERY OF NATIVE HEART WITHOUT ANGINA PECTORIS: ICD-10-CM

## 2019-11-12 DIAGNOSIS — I10 ESSENTIAL HYPERTENSION: ICD-10-CM

## 2019-11-12 DIAGNOSIS — E78.2 MIXED HYPERLIPIDEMIA: ICD-10-CM

## 2019-11-14 RX ORDER — ROSUVASTATIN CALCIUM 10 MG/1
10 TABLET, COATED ORAL NIGHTLY
Qty: 90 TABLET | Refills: 3 | Status: SHIPPED | OUTPATIENT
Start: 2019-11-14 | End: 2020-10-20

## 2019-11-21 RX ORDER — CLOPIDOGREL BISULFATE 75 MG/1
75 TABLET ORAL DAILY
Qty: 90 TABLET | Refills: 3 | Status: SHIPPED | OUTPATIENT
Start: 2019-11-21 | End: 2020-11-09

## 2020-01-22 RX ORDER — ERGOCALCIFEROL 1.25 MG/1
CAPSULE ORAL
Qty: 4 CAPSULE | Refills: 5 | Status: SHIPPED | OUTPATIENT
Start: 2020-01-22 | End: 2021-05-19 | Stop reason: HOSPADM

## 2020-01-23 NOTE — TELEPHONE ENCOUNTER
Requested Prescriptions     Pending Prescriptions Disp Refills    metoprolol succinate (TOPROL XL) 50 MG extended release tablet [Pharmacy Med Name: METOPROLOL SUCC ER 50MG TABLET] 90 tablet 1     Sig: TAKE 1 TABLET BY MOUTH  DAILY    potassium chloride (KLOR-CON M) 20 MEQ extended release tablet [Pharmacy Med Name: POTASSIUM  20MEQ CONTROLLED RELEASE TABLET  SR CHLORIDE MC TB] 90 tablet 1     Sig: TAKE 1 TABLET BY MOUTH  DAILY       Last Office Visit: 8/29/19    Next Office Visit: none

## 2020-01-24 RX ORDER — METOPROLOL SUCCINATE 50 MG/1
TABLET, EXTENDED RELEASE ORAL
Qty: 90 TABLET | Refills: 1 | Status: SHIPPED | OUTPATIENT
Start: 2020-01-24 | End: 2020-07-16

## 2020-01-24 RX ORDER — POTASSIUM CHLORIDE 20 MEQ/1
20 TABLET, EXTENDED RELEASE ORAL DAILY
Qty: 90 TABLET | Refills: 1 | Status: SHIPPED | OUTPATIENT
Start: 2020-01-24 | End: 2020-07-16

## 2020-02-11 RX ORDER — FUROSEMIDE 80 MG
TABLET ORAL
Qty: 90 TABLET | Refills: 1 | Status: SHIPPED | OUTPATIENT
Start: 2020-02-11 | End: 2020-08-03

## 2020-02-12 ENCOUNTER — OFFICE VISIT (OUTPATIENT)
Dept: PRIMARY CARE CLINIC | Age: 65
End: 2020-02-12
Payer: MEDICARE

## 2020-02-12 VITALS
SYSTOLIC BLOOD PRESSURE: 118 MMHG | BODY MASS INDEX: 37.72 KG/M2 | OXYGEN SATURATION: 98 % | HEIGHT: 73 IN | HEART RATE: 74 BPM | WEIGHT: 284.6 LBS | DIASTOLIC BLOOD PRESSURE: 70 MMHG

## 2020-02-12 DIAGNOSIS — E78.00 HYPERCHOLESTEROLEMIA: ICD-10-CM

## 2020-02-12 DIAGNOSIS — N40.1 BENIGN PROSTATIC HYPERPLASIA WITH LOWER URINARY TRACT SYMPTOMS, SYMPTOM DETAILS UNSPECIFIED: ICD-10-CM

## 2020-02-12 DIAGNOSIS — I10 ESSENTIAL HYPERTENSION: ICD-10-CM

## 2020-02-12 LAB
A/G RATIO: 1.7 (ref 1.1–2.2)
ALBUMIN SERPL-MCNC: 4.5 G/DL (ref 3.4–5)
ALP BLD-CCNC: 81 U/L (ref 40–129)
ALT SERPL-CCNC: 25 U/L (ref 10–40)
ANION GAP SERPL CALCULATED.3IONS-SCNC: 14 MMOL/L (ref 3–16)
AST SERPL-CCNC: 25 U/L (ref 15–37)
BASOPHILS ABSOLUTE: 0 K/UL (ref 0–0.2)
BASOPHILS RELATIVE PERCENT: 0.5 %
BILIRUB SERPL-MCNC: 0.5 MG/DL (ref 0–1)
BUN BLDV-MCNC: 16 MG/DL (ref 7–20)
CALCIUM SERPL-MCNC: 9.7 MG/DL (ref 8.3–10.6)
CHLORIDE BLD-SCNC: 101 MMOL/L (ref 99–110)
CHOLESTEROL, TOTAL: 191 MG/DL (ref 0–199)
CO2: 26 MMOL/L (ref 21–32)
CREAT SERPL-MCNC: 0.9 MG/DL (ref 0.8–1.3)
EOSINOPHILS ABSOLUTE: 0.1 K/UL (ref 0–0.6)
EOSINOPHILS RELATIVE PERCENT: 2.1 %
GFR AFRICAN AMERICAN: >60
GFR NON-AFRICAN AMERICAN: >60
GLOBULIN: 2.7 G/DL
GLUCOSE BLD-MCNC: 95 MG/DL (ref 70–99)
HCT VFR BLD CALC: 42.9 % (ref 40.5–52.5)
HDLC SERPL-MCNC: 60 MG/DL (ref 40–60)
HEMOGLOBIN: 14.7 G/DL (ref 13.5–17.5)
LDL CHOLESTEROL CALCULATED: 112 MG/DL
LYMPHOCYTES ABSOLUTE: 1.6 K/UL (ref 1–5.1)
LYMPHOCYTES RELATIVE PERCENT: 29.8 %
MCH RBC QN AUTO: 31.5 PG (ref 26–34)
MCHC RBC AUTO-ENTMCNC: 34.2 G/DL (ref 31–36)
MCV RBC AUTO: 92 FL (ref 80–100)
MONOCYTES ABSOLUTE: 0.5 K/UL (ref 0–1.3)
MONOCYTES RELATIVE PERCENT: 8.8 %
NEUTROPHILS ABSOLUTE: 3.2 K/UL (ref 1.7–7.7)
NEUTROPHILS RELATIVE PERCENT: 58.8 %
PDW BLD-RTO: 13.4 % (ref 12.4–15.4)
PLATELET # BLD: 218 K/UL (ref 135–450)
PMV BLD AUTO: 9.5 FL (ref 5–10.5)
POTASSIUM SERPL-SCNC: 4.4 MMOL/L (ref 3.5–5.1)
PROSTATE SPECIFIC ANTIGEN: 7.21 NG/ML (ref 0–4)
RBC # BLD: 4.66 M/UL (ref 4.2–5.9)
SODIUM BLD-SCNC: 141 MMOL/L (ref 136–145)
TOTAL PROTEIN: 7.2 G/DL (ref 6.4–8.2)
TRIGL SERPL-MCNC: 97 MG/DL (ref 0–150)
VLDLC SERPL CALC-MCNC: 19 MG/DL
WBC # BLD: 5.5 K/UL (ref 4–11)

## 2020-02-12 PROCEDURE — 1123F ACP DISCUSS/DSCN MKR DOCD: CPT | Performed by: FAMILY MEDICINE

## 2020-02-12 PROCEDURE — 3017F COLORECTAL CA SCREEN DOC REV: CPT | Performed by: FAMILY MEDICINE

## 2020-02-12 PROCEDURE — 4040F PNEUMOC VAC/ADMIN/RCVD: CPT | Performed by: FAMILY MEDICINE

## 2020-02-12 PROCEDURE — 99397 PER PM REEVAL EST PAT 65+ YR: CPT | Performed by: FAMILY MEDICINE

## 2020-02-12 PROCEDURE — G8484 FLU IMMUNIZE NO ADMIN: HCPCS | Performed by: FAMILY MEDICINE

## 2020-02-12 ASSESSMENT — ENCOUNTER SYMPTOMS
CHEST TIGHTNESS: 0
SINUS PRESSURE: 0
TROUBLE SWALLOWING: 0
BLOOD IN STOOL: 0
EYE REDNESS: 0
RHINORRHEA: 0
VOICE CHANGE: 0
PHOTOPHOBIA: 0
EYE PAIN: 0
APNEA: 0
NAUSEA: 0
CONSTIPATION: 0
RESPIRATORY NEGATIVE: 1
VOMITING: 0
CHOKING: 0
EYES NEGATIVE: 1
SHORTNESS OF BREATH: 0
BACK PAIN: 0
EYE DISCHARGE: 0
ABDOMINAL PAIN: 0
COUGH: 0
EYE ITCHING: 0
DIARRHEA: 0
SORE THROAT: 0
COLOR CHANGE: 0
ABDOMINAL DISTENTION: 0
WHEEZING: 0
SINUS PAIN: 0

## 2020-02-12 ASSESSMENT — LIFESTYLE VARIABLES: HOW OFTEN DO YOU HAVE A DRINK CONTAINING ALCOHOL: 0

## 2020-02-12 ASSESSMENT — PATIENT HEALTH QUESTIONNAIRE - PHQ9
SUM OF ALL RESPONSES TO PHQ QUESTIONS 1-9: 0
SUM OF ALL RESPONSES TO PHQ QUESTIONS 1-9: 0

## 2020-02-12 NOTE — PROGRESS NOTES
SUBJECTIVE:  Patient ID: Slade Smith is a 75 y.o. male. Chief Complaint:  Chief Complaint   Patient presents with    Medicare AWV     Medicare wellness initial       HPI   75year old Male  Initial Wellness  New Job [employer]  Past Medical History:   Diagnosis Date    CAD (coronary artery disease)     Hyperlipidemia     Hypertension     S/P colonoscopy sept 2010    BN    Shoulder injury     LEFT & RIGHT /Dr Hugh Terrazas (football)    Torn meniscus     LT /Dr Hugh Terrazas (football)     Past Surgical History:   Procedure Laterality Date    BACK SURGERY Bilateral 2014    SCIATIC NERVE     COLONOSCOPY N/A 9/3/2019    COLONOSCOPY WITH BIOPSY performed by Garrison Muller MD at [address]  2006    x2    ENTEROSCOPY N/A 10/1/2019    PUSH ENTEROSCOPY BIOPSY SMALL BOWEL performed by Garrison Muller MD at . [facility] Left 07/2019    RING FINGER - TRIGGER    HAND SURGERY  2005,2010    thumb ,nando trigger    HEMORRHOID SURGERY  2010    KNEE SURGERY  2589,[id number]    RT x 3 one scope & 2 major    TOTAL KNEE ARTHROPLASTY Right 12/2014    UPPER GASTROINTESTINAL ENDOSCOPY N/A 9/25/2018    EGD BIOPSY GASTRIC FOR H PYLORI performed by Garrison Muller MD at [address] N/A 12/18/2018    [facility] N/A 9/3/2019    EGD BIOPSY performed by Garrison Muller MD at [facility] ENDOSCOPY     No Known Allergies  Family History   Problem Relation Age of Onset    Cancer Mother         Ovarian ca @69    Other Father         age 79 +respiratory Failure /mine work     Social History     Patient does not qualify to have social determinant information on file (likely too young).    Social History Narrative        New career [employer]    2 girls 34 & 21     Wife FT [employer]  ,Retired [employer]    No tobacco       Past Medical History: Diagnosis Date    CAD (coronary artery disease)     Hyperlipidemia     Hypertension     S/P colonoscopy sept 2010    BN    Shoulder injury     LEFT & RIGHT /Dr Raissa Shahid (football)    Torn meniscus     LT /Dr Raissa Shahid (football)     Past Surgical History:   Procedure Laterality Date    BACK SURGERY Bilateral 2014    SCIATIC NERVE     COLONOSCOPY N/A 9/3/2019    COLONOSCOPY WITH BIOPSY performed by David Mcfarland MD at [address] [place] [address]  2006    x2    ENTEROSCOPY N/A 10/1/2019    PUSH ENTEROSCOPY BIOPSY SMALL BOWEL performed by David Mcfarland MD at . [facility] Left 07/2019    RING FINGER - TRIGGER    HAND SURGERY  2005,2010    thumb ,nando trigger    HEMORRHOID SURGERY  2010    KNEE SURGERY  5120,[id number]    RT x 3 one scope & 2 major    TOTAL KNEE ARTHROPLASTY Right 12/2014    UPPER GASTROINTESTINAL ENDOSCOPY N/A 9/25/2018    EGD BIOPSY GASTRIC FOR H PYLORI performed by David Mcfarland MD at [facility] N/A 12/18/2018    EGD BIOPSY performed by David Mcfarland MD at [facility] N/A 9/3/2019    EGD BIOPSY performed by David Mcfarland MD at [facility] ENDOSCOPY     No Known Allergies  Family History   Problem Relation Age of Onset    Cancer Mother         Ovarian ca @69    Other Father         age 79 +respiratory Failure /mine work     Social History     Patient does not qualify to have social determinant information on file (likely too young).    Social History Narrative        New career [employer]    3 girls    Wife FT    No tobacco       Patient Active Problem List   Diagnosis    CAD (coronary artery disease)    Hypercholesterolemia    BPH (benign prostatic hyperplasia)    Hyun Nasuti cyst ([facility] [address]    Other tenosynovitis of hand and wrist    Hypertension    Localized edema     Current Outpatient Medications   Medication Sig Dispense Refill    furosemide (LASIX) CHOLHDLRATIO 2.7 01/26/2016       Chemistry        Component Value Date/Time     07/26/2019 0857    K 4.1 07/26/2019 0857     07/26/2019 0857    CO2 26 07/26/2019 0857    BUN 13 07/26/2019 0857    CREATININE 0.9 07/26/2019 0857        Component Value Date/Time    CALCIUM 9.4 07/26/2019 0857    ALKPHOS 86 07/26/2019 0857    AST 18 07/26/2019 0857    ALT 14 07/26/2019 0857    BILITOT 0.5 07/26/2019 0857        Lab Results   Component Value Date    LABA1C 5.7 07/26/2019     Lab Results   Component Value Date    .9 07/26/2019     Lab Results   Component Value Date    TSH 2.05 07/26/2019     Lab Results   Component Value Date    PSA 5.93 (H) 02/09/2015    PSA 6.1 (H) 07/31/2012         Review of Systems   Constitutional: Negative for activity change, appetite change, chills, diaphoresis, fatigue, fever and unexpected weight change. Good health  Exercise 45 min  6 days/week   HENT: Negative. Negative for congestion, ear discharge, ear pain, hearing loss, nosebleeds, postnasal drip, rhinorrhea, sinus pressure, sinus pain, sore throat, tinnitus, trouble swallowing and voice change. Eyes: Negative. Negative for photophobia, pain, discharge, redness, itching and visual disturbance. Respiratory: Negative. Negative for apnea, cough, choking, chest tightness, shortness of breath and wheezing. CPAP   Cardiovascular: Negative for chest pain, palpitations and leg swelling. Cardiology Dr Mac Castro   Visit Q 6 month   Plavix  + Stent   Gastrointestinal: Negative for abdominal distention, abdominal pain, blood in stool, constipation, diarrhea, nausea and vomiting. BM regular   Endocrine: Negative. Negative for cold intolerance, heat intolerance, polydipsia, polyphagia and polyuria. Genitourinary: Negative for dysuria and frequency. [facility]   Musculoskeletal: Negative for back pain, gait problem and neck pain.         RT knee replacement  Several surgery   Skin:

## 2020-02-13 LAB
ESTIMATED AVERAGE GLUCOSE: 116.9 MG/DL
HBA1C MFR BLD: 5.7 %

## 2020-03-26 ENCOUNTER — TELEPHONE (OUTPATIENT)
Dept: PRIMARY CARE CLINIC | Age: 65
End: 2020-03-26

## 2020-03-26 NOTE — LETTER
[address] [place] [address]  Phone: [phone]  Fax: [phone]    Shayla Brasher MD        April 6, 2020     Patient: Alexandra Corona   YOB: 1955   Date of Visit: 3/26/2020       To Whom it May Concern:    Kaleigh Sheriff is at a high risk for COVID 19. He may return to work on 5/1/20. If you have any questions or concerns, please don't hesitate to call.     Sincerely,         Shayla Brasher MD

## 2020-03-26 NOTE — TELEPHONE ENCOUNTER
Patient has called and stated that his employer is asking if \"based upon his current health status\" is he a candidate for getting the covid 19 virus? He also needs this in a written form. Could you please call the patient? Thanks.

## 2020-04-28 ENCOUNTER — TELEPHONE (OUTPATIENT)
Dept: PRIMARY CARE CLINIC | Age: 65
End: 2020-04-28

## 2020-06-01 ENCOUNTER — VIRTUAL VISIT (OUTPATIENT)
Dept: PRIMARY CARE CLINIC | Age: 65
End: 2020-06-01
Payer: COMMERCIAL

## 2020-06-01 VITALS — BODY MASS INDEX: 37.64 KG/M2 | HEIGHT: 73 IN | WEIGHT: 284 LBS

## 2020-06-01 PROCEDURE — 1123F ACP DISCUSS/DSCN MKR DOCD: CPT | Performed by: NURSE PRACTITIONER

## 2020-06-01 PROCEDURE — 3017F COLORECTAL CA SCREEN DOC REV: CPT | Performed by: NURSE PRACTITIONER

## 2020-06-01 PROCEDURE — 1036F TOBACCO NON-USER: CPT | Performed by: NURSE PRACTITIONER

## 2020-06-01 PROCEDURE — G8427 DOCREV CUR MEDS BY ELIG CLIN: HCPCS | Performed by: NURSE PRACTITIONER

## 2020-06-01 PROCEDURE — G8417 CALC BMI ABV UP PARAM F/U: HCPCS | Performed by: NURSE PRACTITIONER

## 2020-06-01 PROCEDURE — 4040F PNEUMOC VAC/ADMIN/RCVD: CPT | Performed by: NURSE PRACTITIONER

## 2020-06-01 PROCEDURE — 99213 OFFICE O/P EST LOW 20 MIN: CPT | Performed by: NURSE PRACTITIONER

## 2020-06-01 RX ORDER — POLYMYXIN B SULFATE AND TRIMETHOPRIM 1; 10000 MG/ML; [USP'U]/ML
1 SOLUTION OPHTHALMIC EVERY 4 HOURS
Qty: 1 BOTTLE | Refills: 0 | Status: SHIPPED | OUTPATIENT
Start: 2020-06-01 | End: 2020-06-08

## 2020-06-01 RX ORDER — IBUPROFEN 800 MG/1
800 TABLET ORAL 2 TIMES DAILY PRN
Qty: 60 TABLET | Refills: 2 | Status: ON HOLD | OUTPATIENT
Start: 2020-06-01 | End: 2021-04-23 | Stop reason: HOSPADM

## 2020-06-01 ASSESSMENT — ENCOUNTER SYMPTOMS
RHINORRHEA: 0
SINUS PAIN: 0
FACIAL SWELLING: 0
SINUS PRESSURE: 0
PHOTOPHOBIA: 0
EYE REDNESS: 1
EYE PAIN: 1
EYE ITCHING: 1
COUGH: 0
WHEEZING: 0
CHEST TIGHTNESS: 0
COLOR CHANGE: 0
EYE DISCHARGE: 1
SHORTNESS OF BREATH: 0

## 2020-06-01 ASSESSMENT — PATIENT HEALTH QUESTIONNAIRE - PHQ9
1. LITTLE INTEREST OR PLEASURE IN DOING THINGS: 0
SUM OF ALL RESPONSES TO PHQ QUESTIONS 1-9: 0
SUM OF ALL RESPONSES TO PHQ9 QUESTIONS 1 & 2: 0
2. FEELING DOWN, DEPRESSED OR HOPELESS: 0
SUM OF ALL RESPONSES TO PHQ QUESTIONS 1-9: 0

## 2020-06-01 NOTE — PROGRESS NOTES
2020        TELEHEALTH EVALUATION -- Audio/Visual (During DARDN-18 public health emergency)    HPI:    Ingrid Dale (:  1955) has requested an audio/video evaluation for the following concern(s): eye drainage    Pt seen via doxy. me virtual visit. Woke up on Saturday with some discharge in right eye. Felt grainy. He flushed his eye out and felt better. The next day his eye was crusted over. Has green discharge from right eye. Was matted shut again this am.   Does itch. No vision changes. Does not wear contacts. No swelling around eye or eyelid. No visible stye  No other symptoms. Review of Systems   Constitutional: Negative for activity change, chills and fever. HENT: Negative for congestion, ear pain, facial swelling, rhinorrhea, sinus pressure and sinus pain. Eyes: Positive for pain, discharge, redness and itching. Negative for photophobia and visual disturbance. Respiratory: Negative for cough, chest tightness, shortness of breath and wheezing. Cardiovascular: Negative for chest pain, palpitations and leg swelling. Skin: Negative for color change, pallor and rash. Neurological: Negative for dizziness, weakness, light-headedness and headaches. Hematological: Negative for adenopathy. Prior to Visit Medications    Medication Sig Taking?  Authorizing Provider   trimethoprim-polymyxin b (POLYTRIM) 01054-4.1 UNIT/ML-% ophthalmic solution Place 1 drop into the right eye every 4 hours for 7 days Yes CORI Rodriguez - CNP   ibuprofen (ADVIL;MOTRIN) 800 MG tablet Take 1 tablet by mouth 2 times daily as needed for Pain Yes CORI Caputo - CNP   furosemide (LASIX) 80 MG tablet TAKE 1 TABLET BY MOUTH  DAILY Yes Addison Santamaria MD   metoprolol succinate (TOPROL XL) 50 MG extended release tablet TAKE 1 TABLET BY MOUTH  DAILY Yes Addison Santamaria MD   potassium chloride (KLOR-CON M) 20 MEQ extended release tablet TAKE 1 TABLET BY MOUTH  DAILY Yes Addison Santamaria MD

## 2020-06-01 NOTE — PATIENT INSTRUCTIONS
Patient Education        Pinkeye: Care Instructions  Your Care Instructions     Pinkeye is redness and swelling of the eye surface and the conjunctiva (the lining of the eyelid and the covering of the white part of the eye). Pinkeye is also called conjunctivitis. Pinkeye is often caused by infection with bacteria or a virus. Dry air, allergies, smoke, and chemicals are other common causes. Pinkeye often clears on its own in 7 to 10 days. Antibiotics only help if the pinkeye is caused by bacteria. Pinkeye caused by infection spreads easily. If an allergy or chemical is causing pinkeye, it will not go away unless you can avoid whatever is causing it. Follow-up care is a key part of your treatment and safety. Be sure to make and go to all appointments, and call your doctor if you are having problems. It's also a good idea to know your test results and keep a list of the medicines you take. How can you care for yourself at home? · Wash your hands often. Always wash them before and after you treat pinkeye or touch your eyes or face. · Use moist cotton or a clean, wet cloth to remove crust. Wipe from the inside corner of the eye to the outside. Use a clean part of the cloth for each wipe. · Put cold or warm wet cloths on your eye a few times a day if the eye hurts. · Do not wear contact lenses or eye makeup until the pinkeye is gone. Throw away any eye makeup you were using when you got pinkeye. Clean your contacts and storage case. If you wear disposable contacts, use a new pair when your eye has cleared and it is safe to wear contacts again. · If the doctor gave you antibiotic ointment or eyedrops, use them as directed. Use the medicine for as long as instructed, even if your eye starts looking better soon. Keep the bottle tip clean, and do not let it touch the eye area. · To put in eyedrops or ointment:  ? Tilt your head back, and pull your lower eyelid down with one finger. ?  Drop or squirt the medicine

## 2020-07-16 RX ORDER — POTASSIUM CHLORIDE 20 MEQ/1
20 TABLET, EXTENDED RELEASE ORAL DAILY
Qty: 90 TABLET | Refills: 1 | Status: SHIPPED | OUTPATIENT
Start: 2020-07-16 | End: 2020-10-26 | Stop reason: SDUPTHER

## 2020-07-16 RX ORDER — METOPROLOL SUCCINATE 50 MG/1
TABLET, EXTENDED RELEASE ORAL
Qty: 90 TABLET | Refills: 1 | Status: SHIPPED | OUTPATIENT
Start: 2020-07-16 | End: 2020-10-26 | Stop reason: SDUPTHER

## 2020-08-04 NOTE — TELEPHONE ENCOUNTER
Requested Prescriptions     Pending Prescriptions Disp Refills    furosemide (LASIX) 80 MG tablet [Pharmacy Med Name: FUROSEMIDE  80MG  TAB] 90 tablet 0     Sig: TAKE 1 TABLET BY MOUTH  DAILY          Number: 90    Refills: 3    Last Office Visit: 8/29/2019     Next Office Visit: Visit date not found

## 2020-08-07 RX ORDER — FUROSEMIDE 80 MG
TABLET ORAL
Qty: 90 TABLET | Refills: 0 | Status: SHIPPED | OUTPATIENT
Start: 2020-08-07 | End: 2020-10-23

## 2020-10-20 ENCOUNTER — TELEPHONE (OUTPATIENT)
Dept: CARDIOLOGY CLINIC | Age: 65
End: 2020-10-20

## 2020-10-20 RX ORDER — ROSUVASTATIN CALCIUM 10 MG/1
10 TABLET, COATED ORAL NIGHTLY
Qty: 90 TABLET | Refills: 0 | Status: SHIPPED | OUTPATIENT
Start: 2020-10-20 | End: 2020-10-26 | Stop reason: SDUPTHER

## 2020-10-20 NOTE — TELEPHONE ENCOUNTER
Called patient to make an appointment last seen 8/29/19. Patient stated that he will call the [facility] office for follow up.

## 2020-10-23 RX ORDER — FUROSEMIDE 80 MG
TABLET ORAL
Qty: 90 TABLET | Refills: 0 | Status: SHIPPED | OUTPATIENT
Start: 2020-10-23 | End: 2020-10-26 | Stop reason: SDUPTHER

## 2020-10-23 RX ORDER — CLOPIDOGREL BISULFATE 75 MG/1
75 TABLET ORAL DAILY
Qty: 90 TABLET | Refills: 3 | OUTPATIENT
Start: 2020-10-23

## 2020-10-26 ENCOUNTER — OFFICE VISIT (OUTPATIENT)
Dept: CARDIOLOGY CLINIC | Age: 65
End: 2020-10-26
Payer: COMMERCIAL

## 2020-10-26 VITALS
DIASTOLIC BLOOD PRESSURE: 82 MMHG | SYSTOLIC BLOOD PRESSURE: 148 MMHG | BODY MASS INDEX: 39.18 KG/M2 | TEMPERATURE: 96.9 F | HEART RATE: 61 BPM | WEIGHT: 297 LBS

## 2020-10-26 PROBLEM — E66.01 MORBIDLY OBESE (HCC): Status: ACTIVE | Noted: 2020-10-26

## 2020-10-26 PROCEDURE — 99213 OFFICE O/P EST LOW 20 MIN: CPT | Performed by: INTERNAL MEDICINE

## 2020-10-26 RX ORDER — POTASSIUM CHLORIDE 20 MEQ/1
20 TABLET, EXTENDED RELEASE ORAL DAILY
Qty: 90 TABLET | Refills: 3 | Status: SHIPPED | OUTPATIENT
Start: 2020-10-26 | End: 2021-08-19

## 2020-10-26 RX ORDER — FUROSEMIDE 80 MG
80 TABLET ORAL DAILY
Qty: 90 TABLET | Refills: 3 | Status: SHIPPED | OUTPATIENT
Start: 2020-10-26 | End: 2021-04-13

## 2020-10-26 RX ORDER — ROSUVASTATIN CALCIUM 20 MG/1
20 TABLET, COATED ORAL NIGHTLY
Qty: 90 TABLET | Refills: 3 | Status: SHIPPED | OUTPATIENT
Start: 2020-10-26 | End: 2021-08-19

## 2020-10-26 RX ORDER — METOPROLOL SUCCINATE 50 MG/1
50 TABLET, EXTENDED RELEASE ORAL DAILY
Qty: 90 TABLET | Refills: 3 | Status: ON HOLD | OUTPATIENT
Start: 2020-10-26 | End: 2021-04-23 | Stop reason: HOSPADM

## 2020-10-26 RX ORDER — ROSUVASTATIN CALCIUM 10 MG/1
10 TABLET, COATED ORAL NIGHTLY
Qty: 90 TABLET | Refills: 3 | Status: SHIPPED | OUTPATIENT
Start: 2020-10-26 | End: 2020-10-26

## 2020-10-26 NOTE — PROGRESS NOTES
Subjective:      Patient ID: Gino Soria is a 77 y.o. male. CC:  Follow up CAD htn    HPI:   Mr. Jennifer Kumari is doing well overall. No chest pain and edema has gotten worse and was started on lasix 80 mg am with improvement in legs. Lifts weights still. Looks great. 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 Patient  has a past medical history of Torn meniscus; S/P colonoscopy; and Shoulder injury. Current Outpatient Prescriptions   Medication Sig Dispense Refill    ibuprofen (ADVIL;MOTRIN) 800 MG tablet TAKE 1 TABLET BY MOUTH EVERY 8 HOURS FOR PAIN  90 tablet  0    simvastatin (ZOCOR) 80 MG tablet TAKE 1 TABLET BY MOUTH NIGHTLY  30 tablet  6    atovaquone-proguanil (MALARONE) 250-100 MG per tablet Take 1 tablet by mouth daily. 21 tablet  0    clopidogrel (PLAVIX) 75 MG tablet TAKE 1 TABLET BY MOUTH DAILY. 30 tablet  6    metoprolol (TOPROL-XL) 50 MG XL tablet TAKE 1 TABLET BY MOUTH DAILY. 30 tablet  6    aspirin 81 MG EC tablet Take 81 mg by mouth daily. No current facility-administered medications for this visit. Vitals  Weight: 280 lb (127.007 kg)  Blood Pressure:  104/62  Pulse: 76     Review of Systems   Constitutional: Negative. HENT: Negative. Eyes: Negative. Respiratory: Negative. Cardiovascular: Negative. Gastrointestinal: Negative. Genitourinary: Negative. Musculoskeletal: Negative. Neurological: Negative.     Hematological: Negative. Psychiatric/Behavioral: Negative. Same exam as 8/19/19. Objective:   Physical Exam   Nursing note and vitals reviewed. Constitutional: He is oriented to person, place, and time. He appears well-developed and well-nourished. No distress. HENT:   Head: Normocephalic and atraumatic. Mouth/Throat: No oropharyngeal exudate. Eyes: Conjunctivae are normal. Pupils are equal, round, and reactive to light. Right eye exhibits no discharge. Left eye exhibits no discharge. No scleral icterus. Neck: Normal range of motion. Neck supple. No JVD present. No tracheal deviation present. No thyromegaly present. Cardiovascular: Normal rate, regular rhythm, normal heart sounds and intact distal pulses. Exam reveals no gallop and no friction rub. No murmur heard. Pulmonary/Chest: Effort normal. No stridor. No respiratory distress. He has no wheezes. He has no rales. He exhibits no tenderness. Abdominal: Soft. Bowel sounds are normal. He exhibits no distension and no mass. No tenderness. He has no rebound and no guarding. Musculoskeletal: He exhibits LE edema and no tenderness. Lymphadenopathy:     He has no cervical adenopathy. Neurological: He is alert and oriented to person, place, and time. No cranial nerve deficit. Coordination normal.   Skin: Skin is warm and dry. No rash noted. He is not diaphoretic. No erythema. No pallor. Psychiatric: He has a normal mood and affect. His behavior is normal. Judgment and thought content normal.       Assessment:      Patient Active Problem List   Diagnosis    CAD (coronary artery disease)    Hypercholesterolemia    BPH (benign prostatic hyperplasia)    Tollie Fernanda cyst    Other tenosynovitis of hand and wrist    Hypertension           Plan:      Continue current medications. Stable cardiovascular status. Stable after stenting. Echo 11/15/17 was WNL. HTN: covered. Edema:   Continue 80 mg lasix and kcl.     Continues  methodology.

## 2020-11-09 RX ORDER — CLOPIDOGREL BISULFATE 75 MG/1
75 TABLET ORAL DAILY
Qty: 90 TABLET | Refills: 3 | Status: SHIPPED | OUTPATIENT
Start: 2020-11-09 | End: 2021-05-10

## 2020-11-25 ENCOUNTER — OFFICE VISIT (OUTPATIENT)
Dept: PRIMARY CARE CLINIC | Age: 65
End: 2020-11-25
Payer: COMMERCIAL

## 2020-11-25 PROCEDURE — 99211 OFF/OP EST MAY X REQ PHY/QHP: CPT | Performed by: NURSE PRACTITIONER

## 2020-11-25 NOTE — PROGRESS NOTES
Vazquez Stanton received a viral test for COVID-19. They were educated on isolation and quarantine as appropriate. For any symptoms, they were directed to seek care from their PCP, given contact information to establish with a doctor, directed to an urgent care or the emergency room.

## 2020-11-26 LAB — SARS-COV-2, NAA: NOT DETECTED

## 2021-02-09 ENCOUNTER — HOSPITAL ENCOUNTER (EMERGENCY)
Age: 66
Discharge: HOME | End: 2021-02-09
Attending: EMERGENCY MEDICINE
Payer: COMMERCIAL

## 2021-02-09 ENCOUNTER — APPOINTMENT (OUTPATIENT)
Dept: GENERAL RADIOLOGY | Age: 66
End: 2021-02-09
Payer: COMMERCIAL

## 2021-02-09 ENCOUNTER — OFFICE VISIT (OUTPATIENT)
Dept: CARDIOLOGY CLINIC | Age: 66
End: 2021-02-09
Payer: COMMERCIAL

## 2021-02-09 VITALS
BODY MASS INDEX: 39.1 KG/M2 | DIASTOLIC BLOOD PRESSURE: 79 MMHG | OXYGEN SATURATION: 99 % | HEIGHT: 73 IN | WEIGHT: 295 LBS | TEMPERATURE: 98.6 F | HEART RATE: 84 BPM | SYSTOLIC BLOOD PRESSURE: 101 MMHG | RESPIRATION RATE: 16 BRPM

## 2021-02-09 VITALS
BODY MASS INDEX: 40.34 KG/M2 | HEART RATE: 138 BPM | DIASTOLIC BLOOD PRESSURE: 76 MMHG | WEIGHT: 304.4 LBS | SYSTOLIC BLOOD PRESSURE: 126 MMHG | HEIGHT: 73 IN | TEMPERATURE: 97.1 F

## 2021-02-09 DIAGNOSIS — I10 ESSENTIAL HYPERTENSION: ICD-10-CM

## 2021-02-09 DIAGNOSIS — I48.0 PAF (PAROXYSMAL ATRIAL FIBRILLATION) (HCC): ICD-10-CM

## 2021-02-09 DIAGNOSIS — R60.0 LOCALIZED EDEMA: ICD-10-CM

## 2021-02-09 DIAGNOSIS — I48.20 CHRONIC ATRIAL FIBRILLATION (HCC): Primary | ICD-10-CM

## 2021-02-09 DIAGNOSIS — I25.10 CORONARY ARTERY DISEASE INVOLVING NATIVE CORONARY ARTERY OF NATIVE HEART WITHOUT ANGINA PECTORIS: Primary | ICD-10-CM

## 2021-02-09 DIAGNOSIS — E78.2 MIXED HYPERLIPIDEMIA: ICD-10-CM

## 2021-02-09 DIAGNOSIS — I48.91 ATRIAL FIBRILLATION WITH RVR (HCC): ICD-10-CM

## 2021-02-09 LAB
ANION GAP SERPL CALCULATED.3IONS-SCNC: 9 MMOL/L (ref 3–16)
BASOPHILS ABSOLUTE: 0 K/UL (ref 0–0.2)
BASOPHILS RELATIVE PERCENT: 0.2 %
BUN BLDV-MCNC: 10 MG/DL (ref 7–20)
CALCIUM SERPL-MCNC: 8.8 MG/DL (ref 8.3–10.6)
CHLORIDE BLD-SCNC: 102 MMOL/L (ref 99–110)
CO2: 26 MMOL/L (ref 21–32)
CREAT SERPL-MCNC: 0.8 MG/DL (ref 0.8–1.3)
EKG ATRIAL RATE: 104 BPM
EKG DIAGNOSIS: NORMAL
EKG Q-T INTERVAL: 350 MS
EKG QRS DURATION: 98 MS
EKG QTC CALCULATION (BAZETT): 446 MS
EKG R AXIS: 54 DEGREES
EKG T AXIS: 25 DEGREES
EKG VENTRICULAR RATE: 98 BPM
EOSINOPHILS ABSOLUTE: 0.1 K/UL (ref 0–0.6)
EOSINOPHILS RELATIVE PERCENT: 2.1 %
GFR AFRICAN AMERICAN: >60
GFR NON-AFRICAN AMERICAN: >60
GLUCOSE BLD-MCNC: 93 MG/DL (ref 70–99)
HCT VFR BLD CALC: 42.6 % (ref 40.5–52.5)
HEMOGLOBIN: 14.1 G/DL (ref 13.5–17.5)
LYMPHOCYTES ABSOLUTE: 1.5 K/UL (ref 1–5.1)
LYMPHOCYTES RELATIVE PERCENT: 26.8 %
MCH RBC QN AUTO: 30.8 PG (ref 26–34)
MCHC RBC AUTO-ENTMCNC: 33.2 G/DL (ref 31–36)
MCV RBC AUTO: 92.7 FL (ref 80–100)
MONOCYTES ABSOLUTE: 0.5 K/UL (ref 0–1.3)
MONOCYTES RELATIVE PERCENT: 9.3 %
NEUTROPHILS ABSOLUTE: 3.5 K/UL (ref 1.7–7.7)
NEUTROPHILS RELATIVE PERCENT: 61.6 %
PDW BLD-RTO: 12.8 % (ref 12.4–15.4)
PLATELET # BLD: 188 K/UL (ref 135–450)
PMV BLD AUTO: 9.3 FL (ref 5–10.5)
POTASSIUM REFLEX MAGNESIUM: 4 MMOL/L (ref 3.5–5.1)
PRO-BNP: 467 PG/ML (ref 0–124)
RBC # BLD: 4.59 M/UL (ref 4.2–5.9)
SODIUM BLD-SCNC: 137 MMOL/L (ref 136–145)
TROPONIN: 0.02 NG/ML
TROPONIN: <0.01 NG/ML
WBC # BLD: 5.7 K/UL (ref 4–11)

## 2021-02-09 PROCEDURE — 36415 COLL VENOUS BLD VENIPUNCTURE: CPT

## 2021-02-09 PROCEDURE — 84443 ASSAY THYROID STIM HORMONE: CPT

## 2021-02-09 PROCEDURE — 2580000003 HC RX 258: Performed by: STUDENT IN AN ORGANIZED HEALTH CARE EDUCATION/TRAINING PROGRAM

## 2021-02-09 PROCEDURE — 85025 COMPLETE CBC W/AUTO DIFF WBC: CPT

## 2021-02-09 PROCEDURE — 93000 ELECTROCARDIOGRAM COMPLETE: CPT | Performed by: NURSE PRACTITIONER

## 2021-02-09 PROCEDURE — 80048 BASIC METABOLIC PNL TOTAL CA: CPT

## 2021-02-09 PROCEDURE — 99214 OFFICE O/P EST MOD 30 MIN: CPT | Performed by: NURSE PRACTITIONER

## 2021-02-09 PROCEDURE — 83880 ASSAY OF NATRIURETIC PEPTIDE: CPT

## 2021-02-09 PROCEDURE — 84484 ASSAY OF TROPONIN QUANT: CPT

## 2021-02-09 PROCEDURE — 71046 X-RAY EXAM CHEST 2 VIEWS: CPT

## 2021-02-09 PROCEDURE — 93005 ELECTROCARDIOGRAM TRACING: CPT | Performed by: STUDENT IN AN ORGANIZED HEALTH CARE EDUCATION/TRAINING PROGRAM

## 2021-02-09 PROCEDURE — 99283 EMERGENCY DEPT VISIT LOW MDM: CPT

## 2021-02-09 PROCEDURE — 84439 ASSAY OF FREE THYROXINE: CPT

## 2021-02-09 RX ORDER — SODIUM CHLORIDE, SODIUM LACTATE, POTASSIUM CHLORIDE, AND CALCIUM CHLORIDE .6; .31; .03; .02 G/100ML; G/100ML; G/100ML; G/100ML
500 INJECTION, SOLUTION INTRAVENOUS ONCE
Status: COMPLETED | OUTPATIENT
Start: 2021-02-09 | End: 2021-02-09

## 2021-02-09 RX ADMIN — SODIUM CHLORIDE, POTASSIUM CHLORIDE, SODIUM LACTATE AND CALCIUM CHLORIDE 500 ML: 600; 310; 30; 20 INJECTION, SOLUTION INTRAVENOUS at 15:36

## 2021-02-09 NOTE — ED PROVIDER NOTES
[address] [facility] RESIDENT NOTE       Date of evaluation: 2/9/2021    Chief Complaint     Atrial Fibrillation      History of Present Illness     Cathie Bello is a 77 y.o. male with past medical history of CAD status post stents on aspirin and Plavix, hyperlipidemia, hypertension, paroxysmal A. fib who presents to the emergency department with chief complaint of palpitations. Patient reports that he previously would only have 1-2 episodes of A. fib a year, lasting for a few minutes at a time. Over the past 2 months, he has had increasingly frequent episodes of A. fib, and feels palpitations and mild difficulty breathing with these episodes. He was at an appointment with his cardiologist this morning, when he was noted to be in A. fib with RVR with a rate in the 130s. He was sent here for further management. At time of evaluation, he denies a sensation of palpitations or difficulty breathing. Denies recent fevers, chills, chest pain, worsening peripheral edema, abdominal pain, nausea, vomiting. He reports that he felt a sensation of palpitations for approximately 2 minutes this morning in clinic before it resolved spontaneously. Review of Systems     Please see HPI for pertinent positives and negatives. All other systems reviewed and negative. Past Medical, Surgical, Family, and Social History     He has a past medical history of CAD (coronary artery disease), Hyperlipidemia, Hypertension, S/P colonoscopy, Shoulder injury, and Torn meniscus. He has a past surgical history that includes knee surgery (1598,8418,7713); Hand surgery (3315,7309); Hemorrhoid surgery (2010); Coronary angioplasty with stent (2006); Upper gastrointestinal endoscopy (N/A, 9/25/2018); Upper gastrointestinal endoscopy (N/A, 12/18/2018); back surgery (Bilateral, 2014); Total knee arthroplasty (Right, 12/2014); Upper gastrointestinal endoscopy (N/A, 9/3/2019);  Colonoscopy (N/A, 9/3/2019); Finger surgery (Left, 07/2019); and Enteroscopy (N/A, 10/1/2019). His family history includes Cancer in his mother; Other in his father. He reports that he has never smoked. He has never used smokeless tobacco. He reports that he does not drink alcohol or use drugs. Medications     Discharge Medication List as of 2/9/2021  5:45 PM      CONTINUE these medications which have NOT CHANGED    Details   clopidogrel (PLAVIX) 75 MG tablet TAKE 1 TABLET BY MOUTH  DAILY, Disp-90 tablet,R-3Requesting 1 year supplyNormal      metoprolol succinate (TOPROL XL) 50 MG extended release tablet Take 1 tablet by mouth daily, Disp-90 tablet,R-3Normal      potassium chloride (KLOR-CON M) 20 MEQ extended release tablet Take 1 tablet by mouth daily, Disp-90 tablet,R-3Normal      !! furosemide (LASIX) 80 MG tablet Take 1 tablet by mouth daily, Disp-90 tablet,R-3ALL REFILL REQUEST NEED TO BE SENT TO THE [place] OFFICENormal      rosuvastatin (CRESTOR) 20 MG tablet Take 1 tablet by mouth nightly, Disp-90 tablet,R-3Requesting 1 year supplyNormal      ibuprofen (ADVIL;MOTRIN) 800 MG tablet Take 1 tablet by mouth 2 times daily as needed for Pain, Disp-60 tablet,R-2Normal      vitamin D (ERGOCALCIFEROL) 1.25 MG (35879 UT) CAPS capsule TAKE ONE CAPSULE BY MOUTH ONE TIME PER WEEK, Disp-4 capsule, R-5Normal      clotrimazole-betamethasone (LOTRISONE) 1-0.05 % cream APPLY TOPICALLY 2 TIMES DAILY. , Disp-45 g, R-1, Normal      !! furosemide (LASIX) 80 MG tablet TAKE 1 TABLET BY MOUTH  DAILY, Disp-90 tablet, R-2Normal      TURMERIC PO Take 40 mg by mouth 2 times dailyHistorical Med      Specialty Vitamins Products (PROSTATE PO) Take 40 mg by mouth 2 times dailyHistorical Med      Cranberry 600 MG TABS Take by mouth 3 times dailyHistorical Med      aspirin 81 MG tablet Take 81 mg by mouth dailyHistorical Med      tamsulosin (FLOMAX) 0.4 MG capsule Take 0.4 mg by mouth 2 times daily. !! - Potential duplicate medications found.  Please discuss with provider. Allergies     He has No Known Allergies. Physical Exam     INITIAL VITALS: BP: 122/89, Temp: 98.6 °F (37 °C), Pulse: 89, Resp: 20, SpO2: 99 %   Physical Exam  Constitutional:       Appearance: He is not toxic-appearing. HENT:      Head: Normocephalic and atraumatic. Right Ear: External ear normal.      Left Ear: External ear normal.      Mouth/Throat:      Mouth: Mucous membranes are moist.   Eyes:      Extraocular Movements: Extraocular movements intact. Pupils: Pupils are equal, round, and reactive to light. Neck:      Musculoskeletal: Neck supple. Cardiovascular:      Rate and Rhythm: Normal rate. Rhythm irregular. Pulses: Normal pulses. Pulmonary:      Effort: Pulmonary effort is normal. No respiratory distress. Breath sounds: Normal breath sounds. Abdominal:      Palpations: Abdomen is soft. Tenderness: There is no abdominal tenderness. There is no guarding. Musculoskeletal: Normal range of motion. Comments: 1+ pitting edema bilaterally. Neurological:      Mental Status: He is alert. DiagnosticResults     EKG   Interpreted in conjunction with emergencydepartment physician Baron Shara MD  Rhythm: atrial fibrillation - controlled  Rate: normal  Axis: normal  Ectopy: none  Conduction: normal  ST Segments: normal  T Waves:flattening in  III and aVf  Q Waves: none  Clinical Impression: atrial fibrillation (chronic)  Comparison:  2/9/21 - RVR noted on prior EKG; controlled on this EKG    RADIOLOGY:  XR CHEST (2 VW)   Final Result      No acute process. Normal-appearing heart size.           LABS:   Results for orders placed or performed during the hospital encounter of 02/09/21   CBC Auto Differential   Result Value Ref Range    WBC 5.7 4.0 - 11.0 K/uL    RBC 4.59 4.20 - 5.90 M/uL    Hemoglobin 14.1 13.5 - 17.5 g/dL    Hematocrit 42.6 40.5 - 52.5 %    MCV 92.7 80.0 - 100.0 fL    MCH 30.8 26.0 - 34.0 pg    MCHC 33.2 31.0 - 36.0 g/dL    RDW 12.8 12.4 - 15.4 %    Platelets 888 680 - 501 K/uL    MPV 9.3 5.0 - 10.5 fL    Neutrophils % 61.6 %    Lymphocytes % 26.8 %    Monocytes % 9.3 %    Eosinophils % 2.1 %    Basophils % 0.2 %    Neutrophils Absolute 3.5 1.7 - 7.7 K/uL    Lymphocytes Absolute 1.5 1.0 - 5.1 K/uL    Monocytes Absolute 0.5 0.0 - 1.3 K/uL    Eosinophils Absolute 0.1 0.0 - 0.6 K/uL    Basophils Absolute 0.0 0.0 - 0.2 K/uL   Troponin   Result Value Ref Range    Troponin 0.02 (H) <0.01 ng/mL   Basic Metabolic Panel w/ Reflex to MG   Result Value Ref Range    Sodium 137 136 - 145 mmol/L    Potassium reflex Magnesium 4.0 3.5 - 5.1 mmol/L    Chloride 102 99 - 110 mmol/L    CO2 26 21 - 32 mmol/L    Anion Gap 9 3 - 16    Glucose 93 70 - 99 mg/dL    BUN 10 7 - 20 mg/dL    CREATININE 0.8 0.8 - 1.3 mg/dL    GFR Non-African American >60 >60    GFR African American >60 >60    Calcium 8.8 8.3 - 10.6 mg/dL   Brain Natriuretic Peptide   Result Value Ref Range    Pro- (H) 0 - 124 pg/mL   Troponin   Result Value Ref Range    Troponin <0.01 <0.01 ng/mL   EKG 12 Lead   Result Value Ref Range    Ventricular Rate 98 BPM    Atrial Rate 104 BPM    QRS Duration 98 ms    Q-T Interval 350 ms    QTc Calculation (Bazett) 446 ms    R Axis 54 degrees    T Axis 25 degrees    Diagnosis       EKG performed in ER and to be interpreted by ER physician. Confirmed by MD, ER ([id number]),  [address] [place], [address] [place] [address] ([id number]) on 2/9/2021 12:35:03 PM       ED BEDSIDE ULTRASOUND:  Performed by Dr. Xochitl Chaidez - no pericardial effusion, grossly normal EF    RECENT VITALS:  BP: 101/79, Temp: 98.6 °F (37 °C), Pulse: 84,Resp: 16, SpO2: 99 %     Procedures     None    ED Course     Nursing Notes, Past Medical Hx, Past Surgical Hx, Social Hx, Allergies, and Family Hx were reviewed.     The patient was given the followingmedications:  Orders Placed This Encounter   Medications    lactated ringers bolus    apixaban (ELIQUIS) 5 MG TABS tablet     Sig: Take 1 tablet by mouth 2 times daily Dispense:  60 tablet     Refill:  0       CONSULTS:  [address] [facility] / ASSESSMENT / Melanie Siri is a 77 y.o. male with past medical history of A. fib, CAD status post stents on aspirin and Plavix, hyperlipidemia, hypertension who presents to the emergency department as a transfer him from his cardiology clinic after being found to be in A. fib with RVR. Patient reports increasingly frequent episodes of palpitations that he attributes to his A. fib. He is noted to be in controlled A. fib upon arrival to the emergency department, and EKG supports this. His rate seems to be in the 70s to 80s. He is afebrile, and otherwise vitally stable. He denies a sensation of palpitations at the time of evaluation. Cardiopulmonary work-up was obtained to determine possible cause of A. fib. EKG is without signs of ischemia or infarction. Troponin is borderline elevated at 0.02, but resolves to undetectable on repeat troponin. CBC is without leukocytosis or anemia. Renal panel demonstrates normal electrolytes and good kidney function. proBNP is mildly elevated at 467. Chest x-ray is without edema or consolidation. Patient is monitored for several hours in the emergency department without recurrence of A. fib and RVR. Cardiologist is contacted to help assist with anticoagulation and further management. Case is discussed. Determination on whether patient should be placed on warfarin versus DOAC as discussed with cardiologist.  Patient's weight of 134 kg is a consideration, as some of these agents have not been fully studied in this population. After risk-benefit discussion with cardiologist, decision is made to begin apixaban, 5 mg twice daily for patient.   Discussion is also made about converting patient out of A. fib with metoprolol, however it is unclear how long patient has been in A. fib, as he seems to only feel subjective palpitations when his heart rate is fast, but not currently despite persistence of controlled atrial fibrillation in the emergency department. Therefore, no attempt is made to convert patient, and he remains rate controlled while monitored. Pharmacist was contacted to assist with teaching regarding Eliquis and obtaining in the outpatient setting. They complete teaching regarding use of Eliquis and side effects. Patient is encouraged to follow-up with his cardiologist in 1 to 2 weeks to monitor his symptoms, and is given bleeding precautions. He is encouraged to return to the emergency department if he has recurrence of palpitations. He is discharged in stable condition with return precautions. This patient was also evaluated by the attending physician. All care plans werediscussed and agreed upon. Clinical Impression     1. Chronic atrial fibrillation (HCC)        Disposition     PATIENT REFERRED TO:  Zac Gutierrez MD  [address]  [address] [facility]    Schedule an appointment as soon as possible for a visit in 1 week        DISCHARGE MEDICATIONS:  Discharge Medication List as of 2/9/2021  5:45 PM      START taking these medications    Details   apixaban (ELIQUIS) 5 MG TABS tablet Take 1 tablet by mouth 2 times daily, Disp-60 tablet, R-0Print             DISPOSITION Decision To Discharge 02/09/2021 05:29:10 Maricruz More MD  Resident  02/09/21 [id number]

## 2021-02-09 NOTE — ED TRIAGE NOTES
FROM [facility] OFFICE. HAS A HX OF AFIB.   HIS RATES WERE 110-130 IN THE OFFICE.  [facility] WANTED HIM SENT TO BE STARTED ON COUMADIN

## 2021-02-09 NOTE — PROGRESS NOTES
CC/HPI:    77 y.o. patient of Dr Eber La here for paf, CAD (stents 2006), HTN, HLD and LE edema. States he has had 1-2 episodes lasting 3-4 minutes, of a fib a year since 2007. Since December he has had more frequent episodes (5-6 episodes) and each episode is lasting hours. He has mild SOB and mild palpitations. He denies cp, LH/dizziness, or syncope. No n/v/d, fever, chills or GI/ bleeding. No change to chronic LE edema.      In the office the ECG demonstrated A fib RVR       Past Medical History:   Diagnosis Date    CAD (coronary artery disease)     Hyperlipidemia     Hypertension     S/P colonoscopy sept 2010    BN    Shoulder injury     LEFT & RIGHT /Dr Liliana Camara (football)    Torn meniscus     LT /Dr Liliana Camara (football)     Past Surgical History:   Procedure Laterality Date    BACK SURGERY Bilateral 2014    SCIATIC NERVE     COLONOSCOPY N/A 9/3/2019    COLONOSCOPY WITH BIOPSY performed by Nehemiah Villar MD at [address] [place] [address]  2006    x2    ENTEROSCOPY N/A 10/1/2019    PUSH ENTEROSCOPY BIOPSY SMALL BOWEL performed by Nehemiah Villar MD at . [facility] Left 07/2019    [address] - TRIGGER    HAND SURGERY  2005,2010    thumb ,nando trigger    HEMORRHOID SURGERY  2010    KNEE SURGERY  2060,8751,3312    RT x 3 one scope & 2 major    TOTAL KNEE ARTHROPLASTY Right 12/2014    UPPER GASTROINTESTINAL ENDOSCOPY N/A 9/25/2018    EGD BIOPSY GASTRIC FOR H PYLORI performed by Nehemiah Villar MD at [facility] [employer] N/A 12/18/2018    EGD BIOPSY performed by Nehemiah Villar MD at [address] [place] [address] [facility] N/A 9/3/2019    EGD BIOPSY performed by Nehemiah Villar MD at [facility] ENDOSCOPY     Family History   Problem Relation Age of Onset    Cancer Mother         Ovarian ca @69    Other Father         age 79 +respiratory Failure /mine work     Social History     Tobacco Use    Smoking status: Never Smoker  Smokeless tobacco: Never Used   Substance Use Topics    Alcohol use: No    Drug use: No     Allergies:Patient has no known allergies. Review of Systems  General: No changes in weight, fatigue, or night sweats. HEENT: No blurry or decreased vision. No changes in hearing, nasal discharge or sore throat. Cardiovascular:  See HPI. Respiratory: No cough, hemoptysis, or wheezing. Gastrointestinal:  No abdominal pain, hematochezia, melana, constipation, diarrhea, or history of GI ulcers. Genito-Urinary: No dysuria or hematuria. No urgency or polyuria. Musculoskeletal:  No complaints of joint pain, joint swelling or muscular weakness/soreness. Neurological:  No dizziness, headaches, numbness/tingling, speech problems or weakness. Psychological:  No anxiety or depression. Hematological and Lymphatic: No abnormal bleeding or bruising, blood clots, jaundice or swollen lymph nodes. Endocrine:   No malaise/lethargy, palpitations, polydipsia/polyuria, temperature intolerance or unexpected weight changes  Skin:  No rashes or non-healing ulcers. Physical Exam:  Vitals:    02/09/21 1007   BP:    Pulse: 138   Temp:    /76  General (appearance):  No acute distress  Eyes: anicteric   Neck: soft, No JVD  Ears/Nose/Mouth/Thorat: No cyanosis  CV: Irreg, Irreg, tachy   Respiratory:  CLear, mild WOLFE  GI: soft, non-tender, non-distended  Skin: Warm, dry. No rashes  Neuro/Psych: Alert and oriented x 3.  Appropriate behavior  Ext:  No c/c. 1 pitting BLE edema  Pulses:  2+ radial     Weight  Wt Readings from Last 3 Encounters:   10/26/20 297 lb (134.7 kg)   06/01/20 284 lb (128.8 kg)   02/12/20 284 lb 9.6 oz (129.1 kg)          CBC:   Lab Results   Component Value Date    WBC 5.5 02/12/2020    HGB 14.7 02/12/2020    HCT 42.9 02/12/2020    MCV 92.0 02/12/2020     02/12/2020     BMP:  Lab Results   Component Value Date    CREATININE 0.9 02/12/2020    BUN 16 02/12/2020     02/12/2020    K 4.4 02/12/2020  2020    CO2 26 2020     Mag: No results found for: MG  LIVER PROFILE:   Lab Results   Component Value Date    ALT 25 2020    AST 25 2020    ALKPHOS 81 2020    BILITOT 0.5 2020     PT/INR:   Lab Results   Component Value Date    INR 0.9 2014    INR 1.0 2013    PROTIME 12.3 2014    PROTIME 10.6 2013     Pro-BNP No results found for: PROBNP  LIPIDS:  No components found for: CHLPL  Lab Results   Component Value Date    TRIG 97 2020    TRIG 74 2019    TRIG 111 2019     Lab Results   Component Value Date    HDL 60 2020    HDL 65 (H) 2019    HDL 69 2019     Lab Results   Component Value Date    LDLCALC 112 (H) 2020    LDLCALC 86 2019    LDLCALC 115 (H) 2019     Lab Results   Component Value Date    LABVLDL 19 2020    LABVLDL 15 2019    LABVLDL 13 2018     TSH:  Lab Results   Component Value Date    TSH 2.05 2019       IMAGIN/9/2021 A fib RVR    2017 Echo:   Left ventricle size is normal. There is mild concentric left ventricular   hypertrophy. Left ventricular function is normal with ejection fraction   estimated at 55%. No regional wall motion abnormalities are noted. Diastolic   filling parameters suggests grade I diastolic dysfunction. The aortic root   is dilated measuring 4.3 cm. Mild tricuspid regurgitation with RVSP   estimated at 35 mmHg.     Medications:   Current Outpatient Medications   Medication Sig Dispense Refill    clopidogrel (PLAVIX) 75 MG tablet TAKE 1 TABLET BY MOUTH  DAILY 90 tablet 3    metoprolol succinate (TOPROL XL) 50 MG extended release tablet Take 1 tablet by mouth daily 90 tablet 3    potassium chloride (KLOR-CON M) 20 MEQ extended release tablet Take 1 tablet by mouth daily 90 tablet 3    furosemide (LASIX) 80 MG tablet Take 1 tablet by mouth daily 90 tablet 3    rosuvastatin (CRESTOR) 20 MG tablet Take 1 tablet by mouth nightly 90 tablet 3    ibuprofen (ADVIL;MOTRIN) 800 MG tablet Take 1 tablet by mouth 2 times daily as needed for Pain 60 tablet 2    vitamin D (ERGOCALCIFEROL) 1.25 MG (84262 UT) CAPS capsule TAKE ONE CAPSULE BY MOUTH ONE TIME PER WEEK 4 capsule 5    clotrimazole-betamethasone (LOTRISONE) 1-0.05 % cream APPLY TOPICALLY 2 TIMES DAILY. 45 g 1    furosemide (LASIX) 80 MG tablet TAKE 1 TABLET BY MOUTH  DAILY 90 tablet 2    TURMERIC PO Take 40 mg by mouth 2 times daily      Specialty Vitamins Products (PROSTATE PO) Take 40 mg by mouth 2 times daily      Cranberry 600 MG TABS Take by mouth 3 times daily      aspirin 81 MG tablet Take 81 mg by mouth daily      tamsulosin (FLOMAX) 0.4 MG capsule Take 0.4 mg by mouth 2 times daily. No current facility-administered medications for this visit. Assessment:  1. Coronary artery disease involving native coronary artery of native heart without angina pectoris    2. Mixed hyperlipidemia    3. Essential hypertension    4. Localized edema      Plan:    PAF, A fib RVR    Discussed with Dr Darleen Carter   Discussed with Patient and Wife   Will send to ER for further management   Discussed R/B/A to warfarin vs NOAC   Asa, toprol    Check Echo   CAD   Stable   Asa, plavix (would stop plavix once started on A/C)   crestor   toprol  HTN   /76   Toprol  HLD   Crestor     CMP/Lipid  LE edema   Chronic, unchanged       [id number] Score for Atrial Fibrillation Stroke Risk   Risk   Factors  Component Value   C CHF No 0   H HTN Yes 1   A2 Age >= 76 No,  (76 y.o.) 0   D DM No 0   S2 Prior Stroke/TIA No 0   V Vascular Disease No 0   A Age 74-69 Yes,  (76 y.o.) 1   Sc Sex male 0    [id number]  Score  2   Score last updated 2/9/21 47:65 AM EST    Click here for a link to the UpToDate guideline \"Atrial Fibrillation: Anticoagulation therapy to prevent embolization    Disclaimer: Risk Score calculation is dependent on accuracy of patient problem list and past encounter diagnosis.

## 2021-02-09 NOTE — ED PROVIDER NOTES
ED Attending Attestation Note     Date of evaluation: 2/9/2021    This patient was seen by the resident. I have seen and examined the patient, agree with the workup, evaluation, management and diagnosis. The care plan has been discussed. I have reviewed the ECG and concur with the resident's interpretation. My assessment reveals 24-year-old male with history of paroxysmal atrial fibrillation (not currently anticoagulated), coronary artery disease status post PCI and stent in 2006, hypertension, and hyperlipidemia presenting from his cardiology clinic where he was found to be in atrial fibrillation with RVR. On exam the patient is awake, alert, in no acute distress. He is normotensive with an irregular heart rate with rate in the low 100s. ECG shows no acute ischemic changes and my own bedside focused echocardiogram shows normal LV systolic function, no pericardial effusion (study otherwise technically limited). He has no chest pain and shows no clinical signs of heart failure. Work-up showed an initial troponin that was very mildly elevated 0.02, became undetectable on repeat assessment. BNP was also mildly elevated. No acute findings were noted on chest x-ray. His case was discussed with his cardiologist over the phone. Ultimately it was decided to initiate the patient on apixaban and have him follow-up in clinic for reassessment. The patient verbalized understanding agreement with the plan and return precautions and was discharged in stable condition.       Julissa Smith MD  [address], [place]  02/09/21 [id number]

## 2021-02-10 LAB
T4 FREE: 1.2 NG/DL (ref 0.9–1.8)
TSH REFLEX: 2.37 UIU/ML (ref 0.27–4.2)

## 2021-02-15 ENCOUNTER — OFFICE VISIT (OUTPATIENT)
Dept: CARDIOLOGY CLINIC | Age: 66
End: 2021-02-15
Payer: COMMERCIAL

## 2021-02-15 ENCOUNTER — PREP FOR PROCEDURE (OUTPATIENT)
Dept: CARDIOLOGY CLINIC | Age: 66
End: 2021-02-15

## 2021-02-15 VITALS
BODY MASS INDEX: 41.08 KG/M2 | HEART RATE: 70 BPM | WEIGHT: 311.4 LBS | TEMPERATURE: 97.3 F | SYSTOLIC BLOOD PRESSURE: 136 MMHG | DIASTOLIC BLOOD PRESSURE: 78 MMHG

## 2021-02-15 DIAGNOSIS — I25.10 ATHEROSCLEROSIS OF NATIVE CORONARY ARTERY OF NATIVE HEART WITHOUT ANGINA PECTORIS: ICD-10-CM

## 2021-02-15 DIAGNOSIS — I10 ESSENTIAL HYPERTENSION: Primary | ICD-10-CM

## 2021-02-15 DIAGNOSIS — E78.5 HYPERLIPIDEMIA, UNSPECIFIED HYPERLIPIDEMIA TYPE: ICD-10-CM

## 2021-02-15 DIAGNOSIS — I48.19 PERSISTENT ATRIAL FIBRILLATION (HCC): ICD-10-CM

## 2021-02-15 DIAGNOSIS — R60.0 LOCALIZED EDEMA: ICD-10-CM

## 2021-02-15 PROCEDURE — 99214 OFFICE O/P EST MOD 30 MIN: CPT | Performed by: INTERNAL MEDICINE

## 2021-02-15 RX ORDER — SODIUM CHLORIDE 0.9 % (FLUSH) 0.9 %
10 SYRINGE (ML) INJECTION PRN
Status: CANCELLED | OUTPATIENT
Start: 2021-02-15

## 2021-02-15 RX ORDER — SODIUM CHLORIDE 9 MG/ML
INJECTION, SOLUTION INTRAVENOUS CONTINUOUS
Status: CANCELLED | OUTPATIENT
Start: 2021-02-15

## 2021-02-15 RX ORDER — SODIUM CHLORIDE 0.9 % (FLUSH) 0.9 %
10 SYRINGE (ML) INJECTION EVERY 12 HOURS SCHEDULED
Status: CANCELLED | OUTPATIENT
Start: 2021-02-15

## 2021-02-15 NOTE — PROGRESS NOTES
stable from chest pain perspective. Echo 11/15/17 was WNL. HTN: covered. Edema:   Continue 80 mg lasix and kcl. Atrial Fibrillation:  Rate is controlled. Continue eliquis. Plan for YVROSE and D/C cardioversion 3/16/21 with sedation assistance from anesthesiology due to history of NATHAN with CPAP usage. After AF resolved then would rule out ischemia with myoview given new onset of AF.

## 2021-03-04 DIAGNOSIS — I25.10 CORONARY ARTERY DISEASE INVOLVING NATIVE CORONARY ARTERY OF NATIVE HEART WITHOUT ANGINA PECTORIS: ICD-10-CM

## 2021-03-04 DIAGNOSIS — I10 ESSENTIAL HYPERTENSION: ICD-10-CM

## 2021-03-04 DIAGNOSIS — E78.2 MIXED HYPERLIPIDEMIA: ICD-10-CM

## 2021-03-04 DIAGNOSIS — I48.19 PERSISTENT ATRIAL FIBRILLATION (HCC): ICD-10-CM

## 2021-03-04 DIAGNOSIS — R60.0 LOCALIZED EDEMA: ICD-10-CM

## 2021-03-04 LAB
A/G RATIO: 1.8 (ref 1.1–2.2)
ALBUMIN SERPL-MCNC: 4.2 G/DL (ref 3.4–5)
ALP BLD-CCNC: 63 U/L (ref 40–129)
ALT SERPL-CCNC: 14 U/L (ref 10–40)
ANION GAP SERPL CALCULATED.3IONS-SCNC: 12 MMOL/L (ref 3–16)
AST SERPL-CCNC: 20 U/L (ref 15–37)
BILIRUB SERPL-MCNC: 0.3 MG/DL (ref 0–1)
BUN BLDV-MCNC: 12 MG/DL (ref 7–20)
CALCIUM SERPL-MCNC: 9.4 MG/DL (ref 8.3–10.6)
CHLORIDE BLD-SCNC: 102 MMOL/L (ref 99–110)
CO2: 28 MMOL/L (ref 21–32)
CREAT SERPL-MCNC: 0.9 MG/DL (ref 0.8–1.3)
GFR AFRICAN AMERICAN: >60
GFR NON-AFRICAN AMERICAN: >60
GLOBULIN: 2.4 G/DL
GLUCOSE BLD-MCNC: 89 MG/DL (ref 70–99)
HCT VFR BLD CALC: 42.1 % (ref 40.5–52.5)
HEMOGLOBIN: 14.3 G/DL (ref 13.5–17.5)
INR BLD: 1.26 (ref 0.86–1.14)
MCH RBC QN AUTO: 30.8 PG (ref 26–34)
MCHC RBC AUTO-ENTMCNC: 34 G/DL (ref 31–36)
MCV RBC AUTO: 90.8 FL (ref 80–100)
PDW BLD-RTO: 13 % (ref 12.4–15.4)
PLATELET # BLD: 218 K/UL (ref 135–450)
PMV BLD AUTO: 9.8 FL (ref 5–10.5)
POTASSIUM SERPL-SCNC: 4.3 MMOL/L (ref 3.5–5.1)
PROTHROMBIN TIME: 14.7 SEC (ref 10–13.2)
RBC # BLD: 4.64 M/UL (ref 4.2–5.9)
SODIUM BLD-SCNC: 142 MMOL/L (ref 136–145)
TOTAL PROTEIN: 6.6 G/DL (ref 6.4–8.2)
WBC # BLD: 6.7 K/UL (ref 4–11)

## 2021-03-10 ENCOUNTER — OFFICE VISIT (OUTPATIENT)
Dept: PRIMARY CARE CLINIC | Age: 66
End: 2021-03-10
Payer: COMMERCIAL

## 2021-03-10 DIAGNOSIS — I48.19 PERSISTENT ATRIAL FIBRILLATION (HCC): ICD-10-CM

## 2021-03-10 DIAGNOSIS — I25.10 CORONARY ARTERY DISEASE INVOLVING NATIVE CORONARY ARTERY OF NATIVE HEART WITHOUT ANGINA PECTORIS: ICD-10-CM

## 2021-03-10 DIAGNOSIS — E78.2 MIXED HYPERLIPIDEMIA: ICD-10-CM

## 2021-03-10 DIAGNOSIS — Z01.818 PREOP EXAMINATION: Primary | ICD-10-CM

## 2021-03-10 DIAGNOSIS — R60.0 LOCALIZED EDEMA: ICD-10-CM

## 2021-03-10 DIAGNOSIS — I10 ESSENTIAL HYPERTENSION: ICD-10-CM

## 2021-03-10 PROCEDURE — 99211 OFF/OP EST MAY X REQ PHY/QHP: CPT | Performed by: NURSE PRACTITIONER

## 2021-03-11 LAB
ANION GAP SERPL CALCULATED.3IONS-SCNC: 9 MMOL/L (ref 3–16)
BUN BLDV-MCNC: 12 MG/DL (ref 7–20)
CALCIUM SERPL-MCNC: 9.2 MG/DL (ref 8.3–10.6)
CHLORIDE BLD-SCNC: 100 MMOL/L (ref 99–110)
CHOLESTEROL, TOTAL: 143 MG/DL (ref 0–199)
CO2: 28 MMOL/L (ref 21–32)
CREAT SERPL-MCNC: 1 MG/DL (ref 0.8–1.3)
GFR AFRICAN AMERICAN: >60
GFR NON-AFRICAN AMERICAN: >60
GLUCOSE BLD-MCNC: 95 MG/DL (ref 70–99)
HDLC SERPL-MCNC: 51 MG/DL (ref 40–60)
LDL CHOLESTEROL CALCULATED: 79 MG/DL
POTASSIUM SERPL-SCNC: 4.1 MMOL/L (ref 3.5–5.1)
SARS-COV-2: NOT DETECTED
SODIUM BLD-SCNC: 137 MMOL/L (ref 136–145)
TRIGL SERPL-MCNC: 66 MG/DL (ref 0–150)
VLDLC SERPL CALC-MCNC: 13 MG/DL

## 2021-03-15 NOTE — PRE-PROCEDURE INSTRUCTIONS
Attempted to call patient about procedure. No answer. Voicemail left. Told to be here at 0730 for procedure at 0900. NPO after midnight, but can take morning medication with sips of water. Told to have a responsible adult be with the patient to take them home and stay with them afterwards, if they are not admitted to hospital afterwards. And if available bring current list of medications.

## 2021-03-16 ENCOUNTER — ANESTHESIA EVENT (OUTPATIENT)
Dept: CARDIAC CATH/INVASIVE PROCEDURES | Age: 66
End: 2021-03-16

## 2021-03-16 ENCOUNTER — HOSPITAL ENCOUNTER (OUTPATIENT)
Dept: CARDIAC CATH/INVASIVE PROCEDURES | Age: 66
Discharge: HOME | End: 2021-03-18
Payer: COMMERCIAL

## 2021-03-16 ENCOUNTER — ANESTHESIA (OUTPATIENT)
Dept: CARDIAC CATH/INVASIVE PROCEDURES | Age: 66
End: 2021-03-16

## 2021-03-16 VITALS — SYSTOLIC BLOOD PRESSURE: 144 MMHG | DIASTOLIC BLOOD PRESSURE: 82 MMHG

## 2021-03-16 VITALS — HEIGHT: 73 IN | WEIGHT: 267 LBS | BODY MASS INDEX: 35.39 KG/M2

## 2021-03-16 LAB
EKG ATRIAL RATE: 120 BPM
EKG ATRIAL RATE: 52 BPM
EKG ATRIAL RATE: 94 BPM
EKG DIAGNOSIS: NORMAL
EKG P AXIS: 51 DEGREES
EKG P-R INTERVAL: 208 MS
EKG Q-T INTERVAL: 394 MS
EKG Q-T INTERVAL: 422 MS
EKG Q-T INTERVAL: 452 MS
EKG QRS DURATION: 102 MS
EKG QRS DURATION: 106 MS
EKG QRS DURATION: 110 MS
EKG QTC CALCULATION (BAZETT): 420 MS
EKG QTC CALCULATION (BAZETT): 431 MS
EKG QTC CALCULATION (BAZETT): 442 MS
EKG R AXIS: 51 DEGREES
EKG R AXIS: 61 DEGREES
EKG R AXIS: 67 DEGREES
EKG T AXIS: 35 DEGREES
EKG T AXIS: 45 DEGREES
EKG T AXIS: 55 DEGREES
EKG VENTRICULAR RATE: 52 BPM
EKG VENTRICULAR RATE: 66 BPM
EKG VENTRICULAR RATE: 72 BPM
LV EF: 45 %
LVEF MODALITY: NORMAL

## 2021-03-16 PROCEDURE — 93325 DOPPLER ECHO COLOR FLOW MAPG: CPT

## 2021-03-16 PROCEDURE — 6360000002 HC RX W HCPCS: Performed by: NURSE ANESTHETIST, CERTIFIED REGISTERED

## 2021-03-16 PROCEDURE — 93320 DOPPLER ECHO COMPLETE: CPT

## 2021-03-16 PROCEDURE — 99999 PR OFFICE/OUTPT VISIT,PROCEDURE ONLY: CPT | Performed by: INTERNAL MEDICINE

## 2021-03-16 PROCEDURE — 92960 CARDIOVERSION ELECTRIC EXT: CPT

## 2021-03-16 PROCEDURE — 93312 ECHO TRANSESOPHAGEAL: CPT

## 2021-03-16 PROCEDURE — 93010 ELECTROCARDIOGRAM REPORT: CPT | Performed by: INTERNAL MEDICINE

## 2021-03-16 PROCEDURE — 93005 ELECTROCARDIOGRAM TRACING: CPT | Performed by: INTERNAL MEDICINE

## 2021-03-16 PROCEDURE — 6360000002 HC RX W HCPCS

## 2021-03-16 PROCEDURE — 3700000001 HC ADD 15 MINUTES (ANESTHESIA)

## 2021-03-16 PROCEDURE — 3700000000 HC ANESTHESIA ATTENDED CARE

## 2021-03-16 PROCEDURE — 2580000003 HC RX 258: Performed by: INTERNAL MEDICINE

## 2021-03-16 RX ORDER — SODIUM CHLORIDE 0.9 % (FLUSH) 0.9 %
10 SYRINGE (ML) INJECTION EVERY 12 HOURS SCHEDULED
Status: DISCONTINUED | OUTPATIENT
Start: 2021-03-16 | End: 2021-03-19 | Stop reason: HOSPADM

## 2021-03-16 RX ORDER — PROPOFOL 10 MG/ML
INJECTION, EMULSION INTRAVENOUS PRN
Status: DISCONTINUED | OUTPATIENT
Start: 2021-03-16 | End: 2021-03-16 | Stop reason: SDUPTHER

## 2021-03-16 RX ORDER — SODIUM CHLORIDE 0.9 % (FLUSH) 0.9 %
10 SYRINGE (ML) INJECTION PRN
Status: DISCONTINUED | OUTPATIENT
Start: 2021-03-16 | End: 2021-03-19 | Stop reason: HOSPADM

## 2021-03-16 RX ORDER — SODIUM CHLORIDE 9 MG/ML
INJECTION, SOLUTION INTRAVENOUS CONTINUOUS
Status: DISCONTINUED | OUTPATIENT
Start: 2021-03-16 | End: 2021-03-19 | Stop reason: HOSPADM

## 2021-03-16 RX ADMIN — SODIUM CHLORIDE: 9 INJECTION, SOLUTION INTRAVENOUS at 09:05

## 2021-03-16 RX ADMIN — PROPOFOL 50 MG: 10 INJECTION, EMULSION INTRAVENOUS at 09:27

## 2021-03-16 RX ADMIN — PROPOFOL 50 MG: 10 INJECTION, EMULSION INTRAVENOUS at 09:13

## 2021-03-16 RX ADMIN — PROPOFOL 50 MG: 10 INJECTION, EMULSION INTRAVENOUS at 09:32

## 2021-03-16 RX ADMIN — PROPOFOL 50 MG: 10 INJECTION, EMULSION INTRAVENOUS at 09:22

## 2021-03-16 RX ADMIN — PROPOFOL 50 MG: 10 INJECTION, EMULSION INTRAVENOUS at 09:09

## 2021-03-16 RX ADMIN — PROPOFOL 50 MG: 10 INJECTION, EMULSION INTRAVENOUS at 09:18

## 2021-03-16 RX ADMIN — PROPOFOL 50 MG: 10 INJECTION, EMULSION INTRAVENOUS at 09:07

## 2021-03-16 ASSESSMENT — LIFESTYLE VARIABLES: SMOKING_STATUS: 0

## 2021-03-16 NOTE — PROCEDURES
Procedure: DC cardioversion    Indication: Persistent atrial fibrillation. Description:    Patient presents today for DC cardioversion. He has been maintained on chronic anticoagulation with Eliquis 5 mg p.o. twice daily. He is to undergo transesophageal echocardiography study and if there is no evidence of thrombus it we are going to proceed with DC cardioversion. Indeed the transesophageal echocardiography study showed no evidence of intracardiac thrombus but there was spontaneous echo contrast in the left atrium which was enlarged. The patient has moderate mitral regurgitation and left ventricular ejection fraction appears to be about 45%. Please note that sedation was performed by our anesthesiology department. After transesophageal echocardiography was completed and there was no evidence of intracardiac thrombus while taking Eliquis the patient was placed AP with Zoll pads on the left anterior and left posterior chest.  Sedation was continued by our anesthesiology team and the patient underwent synchronized DC cardioversion at 200 J and this was unsuccessful and then with 300 J he converted to normal sinus rhythm. This was confirmed on telemetry and sedation was stopped. Patient awakened and there were no immediate complications. About 15 to 20 minutes later he reverted back into atrial fibrillation with controlled ventricular response at 66 bpm.    Conclusion: Ultimately unsuccessful DC cardioversion with no complications noted. Plan discharge and continue Eliquis 5 mg p.o. twice daily continue his other medications and we will have the patient undergo consultation with our electrophysiology team as an outpatient.

## 2021-03-16 NOTE — ANESTHESIA PRE PROCEDURE
Department of Anesthesiology  Preprocedure Note       Name:  Elmer Bonds   Age:  77 y.o.  :  1955                                          MRN:  [id number]         Date:  3/16/2021      Surgeon: * Surgery not found *    Procedure:     Medications prior to admission:   Prior to Admission medications    Medication Sig Start Date End Date Taking? Authorizing Provider   clopidogrel (PLAVIX) 75 MG tablet TAKE 1 TABLET BY MOUTH  DAILY 20   Eve Serrano MD   metoprolol succinate (TOPROL XL) 50 MG extended release tablet Take 1 tablet by mouth daily 10/26/20   Eve Serrano MD   potassium chloride (KLOR-CON M) 20 MEQ extended release tablet Take 1 tablet by mouth daily 10/26/20   Eve Serrano MD   furosemide (LASIX) 80 MG tablet Take 1 tablet by mouth daily 10/26/20   Eve Serrano MD   rosuvastatin (CRESTOR) 20 MG tablet Take 1 tablet by mouth nightly 10/26/20   Eve Serrano MD   ibuprofen (ADVIL;MOTRIN) 800 MG tablet Take 1 tablet by mouth 2 times daily as needed for Pain 20   Jazlyn Coates APRN - CNP   vitamin D (ERGOCALCIFEROL) 1.25 MG (19490 UT) CAPS capsule TAKE ONE CAPSULE BY MOUTH ONE TIME PER WEEK 20   Sandy Dave MD   clotrimazole-betamethasone (LOTRISONE) 1-0.05 % cream APPLY TOPICALLY 2 TIMES DAILY. 19   Sandy Dave MD   furosemide (LASIX) 80 MG tablet TAKE 1 TABLET BY MOUTH  DAILY 19  Eve Serrano MD   TURMERIC PO Take 40 mg by mouth 2 times daily    Historical Provider, MD   Specialty Vitamins Products (PROSTATE PO) Take 40 mg by mouth 2 times daily    Historical Provider, MD   Cranberry 600 MG TABS Take by mouth 3 times daily    Historical Provider, MD   aspirin 81 MG tablet Take 81 mg by mouth daily    Historical Provider, MD   tamsulosin (FLOMAX) 0.4 MG capsule Take 0.4 mg by mouth 2 times daily.     Historical Provider, MD       Current medications:    Current Outpatient Medications   Medication Sig Dispense Refill    clopidogrel (PLAVIX) 75 MG tablet TAKE 1 TABLET BY MOUTH  DAILY 90 tablet 3    metoprolol succinate (TOPROL XL) 50 MG extended release tablet Take 1 tablet by mouth daily 90 tablet 3    potassium chloride (KLOR-CON M) 20 MEQ extended release tablet Take 1 tablet by mouth daily 90 tablet 3    furosemide (LASIX) 80 MG tablet Take 1 tablet by mouth daily 90 tablet 3    rosuvastatin (CRESTOR) 20 MG tablet Take 1 tablet by mouth nightly 90 tablet 3    ibuprofen (ADVIL;MOTRIN) 800 MG tablet Take 1 tablet by mouth 2 times daily as needed for Pain 60 tablet 2    vitamin D (ERGOCALCIFEROL) 1.25 MG (49330 UT) CAPS capsule TAKE ONE CAPSULE BY MOUTH ONE TIME PER WEEK 4 capsule 5    clotrimazole-betamethasone (LOTRISONE) 1-0.05 % cream APPLY TOPICALLY 2 TIMES DAILY. 45 g 1    furosemide (LASIX) 80 MG tablet TAKE 1 TABLET BY MOUTH  DAILY 90 tablet 2    TURMERIC PO Take 40 mg by mouth 2 times daily      Specialty Vitamins Products (PROSTATE PO) Take 40 mg by mouth 2 times daily      Cranberry 600 MG TABS Take by mouth 3 times daily      aspirin 81 MG tablet Take 81 mg by mouth daily      tamsulosin (FLOMAX) 0.4 MG capsule Take 0.4 mg by mouth 2 times daily.        Current Facility-Administered Medications   Medication Dose Route Frequency Provider Last Rate Last Admin    0.9 % sodium chloride infusion   Intravenous Continuous Anshu Ashby MD        sodium chloride flush 0.9 % injection 10 mL  10 mL Intravenous 2 times per day Anshu Ashby MD        sodium chloride flush 0.9 % injection 10 mL  10 mL Intravenous PRN Anshu Ashby MD           Allergies:  No Known Allergies    Problem List:    Patient Active Problem List   Diagnosis Code    Coronary atherosclerosis I25.10    Other and unspecified hyperlipidemia E78.5    Hypertrophy of prostate without urinary obstruction and other lower urinary tract symptoms (LUTS) N40.0    [facility] cyst ([facility] [address] Q03.1    Other tenosynovitis of hand and wrist M65.849, M65.839    Hypertension I10    Localized edema R60.0    Morbidly obese (HCC) E66.01       Past Medical History:        Diagnosis Date    CAD (coronary artery disease)     Hyperlipidemia     Hypertension     S/P colonoscopy sept 2010    BN    Shoulder injury     LEFT & RIGHT /Dr Rafy Fontaine (football)    Torn meniscus     LT /Dr Rafy Fontaine (football)       Past Surgical History:        Procedure Laterality Date    BACK SURGERY Bilateral 2014    SCIATIC NERVE     COLONOSCOPY N/A 9/3/2019    COLONOSCOPY WITH BIOPSY [facility]  2006    x2    ENTEROSCOPY N/A 10/1/2019    PUSH ENTEROSCOPY BIOPSY SMALL BOWEL performed by Sha Hancock MD at . [place] [address] Left 07/2019    [employer] - TRIGGER    HAND SURGERY  2005,2010    thumb ,nando trigger    HEMORRHOID SURGERY  2010    KNEE SURGERY  1970,0445,5770    RT x 3 one scope & 2 major    TOTAL KNEE ARTHROPLASTY Right 12/2014    UPPER GASTROINTESTINAL ENDOSCOPY N/A 9/25/2018    EGD BIOPSY GASTRIC FOR H PYLORI performed by Sha [facility]ancock MD at [facility] 12/18/2018    EGD BIOPSY performed by Sha [facility]ancock MD at [facility] [employer] [facility] N/A 9/3/2019    EGD BIOPSY performed by Sha Hancock MD at [address] History:    Social History     Tobacco Use    Smoking status: Never Smoker    Smokeless tobacco: Never Used   Substance Use Topics    Alcohol use: No                                Counseling given: Not Answered      Vital Signs (Current): There were no vitals filed for this visit.                                            BP Readings from Last 3 Encounters:   02/15/21 136/78   02/09/21 101/79   02/09/21 126/76       NPO Status: Time of last liquid consumption: 2300                        Time of last solid consumption: 2300                                                      BMI:   Wt Readings from Last 3 Encounters:   02/15/21 (!) 311 lb 6.4 oz (141.3 kg)   02/09/21 295 lb (133.8 kg)   02/09/21 (!) 304 lb 6.4 oz (138.1 kg)     There is no height or weight on file to calculate BMI.    CBC:   Lab Results   Component Value Date    WBC 6.7 03/04/2021    RBC 4.64 03/04/2021    HGB 14.3 03/04/2021    HCT 42.1 03/04/2021    MCV 90.8 03/04/2021    RDW 13.0 03/04/2021     03/04/2021       CMP:   Lab Results   Component Value Date     03/10/2021    K 4.1 03/10/2021    K 4.0 02/09/2021     03/10/2021    CO2 28 03/10/2021    BUN 12 03/10/2021    CREATININE 1.0 03/10/2021    GFRAA >60 03/10/2021    AGRATIO 1.8 03/04/2021    LABGLOM >60 03/10/2021    LABGLOM 91 09/07/2017    GLUCOSE 95 03/10/2021    PROT 6.6 03/04/2021    PROT 6.6 07/31/2012    CALCIUM 9.2 03/10/2021    BILITOT 0.3 03/04/2021    ALKPHOS 63 03/04/2021    AST 20 03/04/2021    ALT 14 03/04/2021       POC Tests: No results for input(s): POCGLU, POCNA, POCK, POCCL, POCBUN, POCHEMO, POCHCT in the last 72 hours.     Coags:   Lab Results   Component Value Date    PROTIME 14.7 03/04/2021    INR 1.26 03/04/2021    APTT 26.4 06/25/2014       HCG (If Applicable): No results found for: PREGTESTUR, PREGSERUM, HCG, HCGQUANT     ABGs: No results found for: PHART, PO2ART, PHE4GSZ, NQK5SAZ, BEART, T8OQHTRM     Type & Screen (If Applicable):  Lab Results   Component Value Date    LABABO AB 06/25/2014    [facility] Negative 06/25/2014       Drug/Infectious Status (If Applicable):  Lab Results   Component Value Date    HEPCAB NON-REACTIVE 02/13/2019       COVID-19 Screening (If Applicable):   Lab Results   Component Value Date    COVID19 Not Detected 03/10/2021    COVID19 NOT DETECTED 11/25/2020           Anesthesia Evaluation  Patient summary reviewed and Nursing notes reviewed no history of anesthetic complications:   Airway: Mallampati: II  TM distance: >3 FB   Neck ROM: full  Mouth opening: > = 3 FB Dental: normal exam         Pulmonary: breath sounds clear to auscultation  (+) sleep apnea: on CPAP,      (-) not a current smoker                           Cardiovascular:  Exercise tolerance: good (>4 METS),   (+) hypertension: moderate, CAD:, dysrhythmias: atrial fibrillation,       NYHA Classification: II  ECG reviewed  Rhythm: regular  Rate: normal           Beta Blocker:  Dose within 24 Hrs         Neuro/Psych:               GI/Hepatic/Renal:   (+) morbid obesity     (-) GERD       Endo/Other: Negative Endo/Other ROS                    Abdominal:   (+) obese,         Vascular:                                        Anesthesia Plan      MAC     ASA 3       Induction: intravenous. MIPS: Prophylactic antiemetics administered. Anesthetic plan and risks discussed with patient. Plan discussed with CRNA.     Attending anesthesiologist reviewed and agrees with Pre Eval content              Don Gomez, DO   3/16/2021

## 2021-03-16 NOTE — H&P
Gastrointestinal: Negative. Genitourinary: Negative. Musculoskeletal: Negative. Neurological: Negative. Hematological: Negative. Psychiatric/Behavioral: Negative.       Same exam as 10/26/20  Objective:   Physical Exam   Nursing note and vitals reviewed. Constitutional: He is oriented to person, place, and time. He appears well-developed and well-nourished. No distress. HENT:   Head: Normocephalic and atraumatic. Mouth/Throat: No oropharyngeal exudate. Eyes: Conjunctivae are normal. Pupils are equal, round, and reactive to light. Right eye exhibits no discharge. Left eye exhibits no discharge. No scleral icterus. Neck: Normal range of motion. Neck supple. No JVD present. No tracheal deviation present. No thyromegaly present. Cardiovascular: irregularly irregular rhythm with normal s1 and S2, intact distal pulses. Exam reveals no gallop and no friction rub. No murmur heard. Pulmonary/Chest: Effort normal. No stridor. No respiratory distress. He has no wheezes. He has no rales. He exhibits no tenderness. Abdominal: Soft. Bowel sounds are normal. He exhibits no distension and no mass. No tenderness. He has no rebound and no guarding. Musculoskeletal: He exhibits LE edema and no tenderness. Lymphadenopathy:     He has no cervical adenopathy. Neurological: He is alert and oriented to person, place, and time. No cranial nerve deficit. Coordination normal.   Skin: Skin is warm and dry. No rash noted. He is not diaphoretic. No erythema. No pallor. Psychiatric: He has a normal mood and affect. His behavior is normal. Judgment and thought content normal.         Assessment:     Diagnosis Orders   1. Essential hypertension      2. Atherosclerosis of native coronary artery of native heart without angina pectoris      3. Hyperlipidemia, unspecified hyperlipidemia type      4.  Localized edema      5. Persistent atrial fibrillation ([address]                             Plan:   Continue current medications. Stable cardiovascular status. CAD: no chest pain. Seems stable from chest pain perspective. Echo 11/15/17 was WNL. HTN: covered. Edema:   Continue 80 mg lasix and kcl. Atrial Fibrillation:  Rate is controlled. Continue eliquis. Plan for YVROSE and D/C cardioversion 3/16/21 with sedation assistance from anesthesiology due to history of NATHAN with CPAP usage. After AF resolved then would rule out ischemia with myoview given new onset of AF.        Staff Addendum:  Proceed with d/C cardioversion if YVROSE on eliquis for 1 month shows no intracardiac thrombus. RBA explained. Sedation by anesthesia.

## 2021-03-17 DIAGNOSIS — I25.10 ATHEROSCLEROSIS OF NATIVE CORONARY ARTERY OF NATIVE HEART WITHOUT ANGINA PECTORIS: Primary | ICD-10-CM

## 2021-03-23 ENCOUNTER — HOSPITAL ENCOUNTER (OUTPATIENT)
Dept: NON INVASIVE DIAGNOSTICS | Age: 66
Discharge: HOME | End: 2021-03-23
Payer: COMMERCIAL

## 2021-03-23 DIAGNOSIS — I25.10 ATHEROSCLEROSIS OF NATIVE CORONARY ARTERY OF NATIVE HEART WITHOUT ANGINA PECTORIS: ICD-10-CM

## 2021-03-23 LAB
LV EF: 37 %
LVEF MODALITY: NORMAL

## 2021-03-23 PROCEDURE — 6360000002 HC RX W HCPCS: Performed by: INTERNAL MEDICINE

## 2021-03-23 PROCEDURE — 2580000003 HC RX 258: Performed by: INTERNAL MEDICINE

## 2021-03-23 PROCEDURE — A9502 TC99M TETROFOSMIN: HCPCS | Performed by: INTERNAL MEDICINE

## 2021-03-23 PROCEDURE — 93017 CV STRESS TEST TRACING ONLY: CPT

## 2021-03-23 PROCEDURE — 3430000000 HC RX DIAGNOSTIC RADIOPHARMACEUTICAL: Performed by: INTERNAL MEDICINE

## 2021-03-23 PROCEDURE — 78452 HT MUSCLE IMAGE SPECT MULT: CPT

## 2021-03-23 RX ORDER — SODIUM CHLORIDE 0.9 % (FLUSH) 0.9 %
10 SYRINGE (ML) INJECTION 2 TIMES DAILY
Status: DISCONTINUED | OUTPATIENT
Start: 2021-03-23 | End: 2021-03-24 | Stop reason: HOSPADM

## 2021-03-23 RX ADMIN — REGADENOSON 0.4 MG: 0.08 INJECTION, SOLUTION INTRAVENOUS at 14:50

## 2021-03-23 RX ADMIN — Medication 10 ML: at 13:44

## 2021-03-23 RX ADMIN — Medication 10 ML: at 14:50

## 2021-03-23 RX ADMIN — TETROFOSMIN 12 MILLICURIE: 1.38 INJECTION, POWDER, LYOPHILIZED, FOR SOLUTION INTRAVENOUS at 13:43

## 2021-03-23 RX ADMIN — TETROFOSMIN 30 MILLICURIE: 1.38 INJECTION, POWDER, LYOPHILIZED, FOR SOLUTION INTRAVENOUS at 14:50

## 2021-03-26 ENCOUNTER — TELEPHONE (OUTPATIENT)
Dept: PRIMARY CARE CLINIC | Age: 66
End: 2021-03-26

## 2021-03-26 NOTE — TELEPHONE ENCOUNTER
Pt is stating he will pick it up the letter when it is ready. He would like to  the letter on Monday if possible.

## 2021-03-26 NOTE — LETTER
[address] [place] [address]  Phone: [phone]  Fax: [phone]     Marycruz Mattson MD           March 29,2021     Patient: Basilio Wang   YOB: 1955   Date of Visit: 3/26/2020         To Whom it May Concern:     Cem Gentile is at a high risk for COVID 19.  Terrence Encarnacionagatha return to work on 5/30/21.     If you have any questions or concerns, please don't hesitate to call.     Sincerely,            Marycruz Mattson MD

## 2021-03-26 NOTE — TELEPHONE ENCOUNTER
Pt is requesting a updated Letter. Stating due to his heart condition he's @ high risk Covid 19. Pt states Dr Jacquelin Jaquez has done this before.

## 2021-03-26 NOTE — LETTER
[address] [place] [address]  Phone: [phone]  Fax: [phone]     Ying Masters MD           April 6, 2020      Patient: Anthony Godfrey   YOB: 1955             To Whom it May Concern:     Cem Gentile is at a high risk for COVID 19.  Marlise Sers return to work on 5/30/21.     If you have any questions or concerns, please don't hesitate to call.     Sincerely,            Ying Masters MD

## 2021-03-27 NOTE — TELEPHONE ENCOUNTER
[id number] Radha Dyson for requested letter to be off  Check with pt when he likes go back work  Did he get covid vaccine ?

## 2021-03-30 ENCOUNTER — IMMUNIZATION (OUTPATIENT)
Dept: PRIMARY CARE CLINIC | Age: 66
End: 2021-03-30
Payer: COMMERCIAL

## 2021-03-30 PROCEDURE — 0011A COVID-19, MODERNA VACCINE 100MCG/0.5ML DOSE: CPT | Performed by: FAMILY MEDICINE

## 2021-03-30 PROCEDURE — 91301 COVID-19, MODERNA VACCINE 100MCG/0.5ML DOSE: CPT | Performed by: FAMILY MEDICINE

## 2021-04-13 ENCOUNTER — OFFICE VISIT (OUTPATIENT)
Dept: CARDIOLOGY CLINIC | Age: 66
End: 2021-04-13
Payer: COMMERCIAL

## 2021-04-13 VITALS
WEIGHT: 287.8 LBS | SYSTOLIC BLOOD PRESSURE: 118 MMHG | HEART RATE: 76 BPM | BODY MASS INDEX: 38.14 KG/M2 | HEIGHT: 73 IN | DIASTOLIC BLOOD PRESSURE: 74 MMHG

## 2021-04-13 DIAGNOSIS — I10 ESSENTIAL HYPERTENSION: ICD-10-CM

## 2021-04-13 DIAGNOSIS — I48.19 PERSISTENT ATRIAL FIBRILLATION (HCC): Primary | ICD-10-CM

## 2021-04-13 DIAGNOSIS — E78.2 MIXED HYPERLIPIDEMIA: ICD-10-CM

## 2021-04-13 DIAGNOSIS — I25.10 CORONARY ARTERY DISEASE INVOLVING NATIVE CORONARY ARTERY OF NATIVE HEART WITHOUT ANGINA PECTORIS: ICD-10-CM

## 2021-04-13 PROCEDURE — 93000 ELECTROCARDIOGRAM COMPLETE: CPT | Performed by: INTERNAL MEDICINE

## 2021-04-13 PROCEDURE — 99205 OFFICE O/P NEW HI 60 MIN: CPT | Performed by: INTERNAL MEDICINE

## 2021-04-13 NOTE — LETTER
[address]  EP Procedure Sheet  4/13/21    Alexandr Faith  1955    Physician: Dr. Fabiano Vera    EP Procedures   Pacemaker implant x EP Study    ICD implant  Atrial flutter ablation     Biv implant x Atrial fibrillation ablation 5/19/21 @ 0730    Generator Change  SVT ablation    Lead revision  VT ablation    Lead extraction +/- upgrade  AVN ablation    Loop implant x Cardioversion  4/20/21 @ 1300   x Other: admission for dofetilide 4/20/21  YVROSE     Equipment   Medtronic   RENETTA Mapping System    [place] x [address]  CryoAblation    Biotronik  Laser Lead Extraction    Special Equipment       EP Procedures Scheduling Request  Time Requested  0730   Specific Day 5/19/21 (AF ablation)   Anesthesia xxx   CT surgery backup    Location [facility]     Pre-Procedure Labs / Imaging  x PT/INR  Type & cross   x CBC  Units PRBC   x BMP/Mg  Units FFP    Venogram  CXR    Echo x Pulmonary CTA for Pulmonary vein mapping     Patient Instructions

## 2021-04-13 NOTE — PATIENT INSTRUCTIONS
Dofetilide Loading    Date of Admission: April 20, 2021    Time of Arrival: 7:00 am    Admitting cardiologist: Dr. Chiara Ca    · Arrive at [facility] through the main entrance. Check in at the main desk. Be prepared to show photo ID and insurance card. · Be prepared for a 3 day admission to the hospital.  If you do not convert to a normal rhythm, you will have an external cardioversion on the 2nd day. · Do NOT take your routine medications the morning of being admitted. · Do NOT stop taking Eliquis (blood thinners). · Please bring a list of your medications to the hospital with you. · You must have someone available to drive you home when you are discharged. It is recommended that you do not drive for 24 hours after the cardioversion. · Lab work is due on 4/15/21 prior to admission. · If you are unable to make this appointment, please call [facility] Cardiology at [phone]. Electrophysiology Study (EPS) and Atrial Fibrillation (AFib) Ablation    Date of Procedure: May 19, 2021    Time of Arrival: 6:00 am    Cardiologist performing procedure: Dr. Chiara Ca    · Arrive at [facility] through the main entrance. Check in at the Outpatient Diagnostic desk on the 1st floor. · Do not eat or drink anything after midnight the night before the procedure. · You may brush your teeth and rinse the morning of the procedure. · Do NOT stop taking Eliquis (blood thinners). It is very important to not miss any doses of Eliquis leading up to the ablation (and after as well). However, do not take blood thinner the morning of the procedure. · Hold dofetilide for 2 doses prior to the procedure. Last dose of dofetilide the morning of 5/18/21. · Take all of your other routine medications the morning of the procedure. However, if you are taking diabetic medications, please HOLD on the day of the procedure (including insulin).   If you take Lantus insulin, take HALF of your usual dose the night before. · Do not apply any lotion, powder, or deodorant the morning of the procedure. · Please bring a list of your medications to the hospital with you. · Please get tested for COVID-19 on 5/13/21 at [facility] testing center (located outside the hospital). They are open 8 am - 3 pm.     · CT scan of the chest is due on 5/13/21. Please call [phone] to schedule. · Lab work is due on 5/13/21. This can be done at the outpatient lab ([address]). You do not need to be fasting. · You must have someone available to drive you home the same day. It is recommended that you do not drive for 24 hours after the procedure. You will also need someone with you at home the night of the procedure. · If you are unable to make this appointment, please call [facility] Cardiology at [phone].

## 2021-04-14 ENCOUNTER — PREP FOR PROCEDURE (OUTPATIENT)
Dept: CARDIOLOGY CLINIC | Age: 66
End: 2021-04-14

## 2021-04-14 RX ORDER — SODIUM CHLORIDE 0.9 % (FLUSH) 0.9 %
5-40 SYRINGE (ML) INJECTION EVERY 12 HOURS SCHEDULED
Status: CANCELLED | OUTPATIENT
Start: 2021-04-14

## 2021-04-14 RX ORDER — SODIUM CHLORIDE 9 MG/ML
INJECTION, SOLUTION INTRAVENOUS CONTINUOUS
Status: CANCELLED | OUTPATIENT
Start: 2021-04-14

## 2021-04-14 RX ORDER — SODIUM CHLORIDE 9 MG/ML
25 INJECTION, SOLUTION INTRAVENOUS PRN
Status: CANCELLED | OUTPATIENT
Start: 2021-04-14

## 2021-04-14 RX ORDER — SODIUM CHLORIDE 0.9 % (FLUSH) 0.9 %
5-40 SYRINGE (ML) INJECTION PRN
Status: CANCELLED | OUTPATIENT
Start: 2021-04-14

## 2021-04-15 DIAGNOSIS — I48.19 PERSISTENT ATRIAL FIBRILLATION (HCC): ICD-10-CM

## 2021-04-15 LAB
ANION GAP SERPL CALCULATED.3IONS-SCNC: 12 MMOL/L (ref 3–16)
BASOPHILS ABSOLUTE: 0 K/UL (ref 0–0.2)
BASOPHILS RELATIVE PERCENT: 0.6 %
BUN BLDV-MCNC: 12 MG/DL (ref 7–20)
CALCIUM SERPL-MCNC: 9.1 MG/DL (ref 8.3–10.6)
CHLORIDE BLD-SCNC: 97 MMOL/L (ref 99–110)
CO2: 28 MMOL/L (ref 21–32)
CREAT SERPL-MCNC: 0.9 MG/DL (ref 0.8–1.3)
EOSINOPHILS ABSOLUTE: 0.1 K/UL (ref 0–0.6)
EOSINOPHILS RELATIVE PERCENT: 2.3 %
GFR AFRICAN AMERICAN: >60
GFR NON-AFRICAN AMERICAN: >60
GLUCOSE BLD-MCNC: 90 MG/DL (ref 70–99)
HCT VFR BLD CALC: 45 % (ref 40.5–52.5)
HEMOGLOBIN: 15.1 G/DL (ref 13.5–17.5)
INR BLD: 1.22 (ref 0.86–1.14)
LYMPHOCYTES ABSOLUTE: 1.5 K/UL (ref 1–5.1)
LYMPHOCYTES RELATIVE PERCENT: 30.8 %
MAGNESIUM: 2 MG/DL (ref 1.8–2.4)
MCH RBC QN AUTO: 30.7 PG (ref 26–34)
MCHC RBC AUTO-ENTMCNC: 33.5 G/DL (ref 31–36)
MCV RBC AUTO: 91.7 FL (ref 80–100)
MONOCYTES ABSOLUTE: 0.4 K/UL (ref 0–1.3)
MONOCYTES RELATIVE PERCENT: 7.7 %
NEUTROPHILS ABSOLUTE: 2.8 K/UL (ref 1.7–7.7)
NEUTROPHILS RELATIVE PERCENT: 58.6 %
PDW BLD-RTO: 13 % (ref 12.4–15.4)
PLATELET # BLD: 177 K/UL (ref 135–450)
PMV BLD AUTO: 9.9 FL (ref 5–10.5)
POTASSIUM SERPL-SCNC: 4 MMOL/L (ref 3.5–5.1)
PROTHROMBIN TIME: 14.2 SEC (ref 10–13.2)
RBC # BLD: 4.91 M/UL (ref 4.2–5.9)
SODIUM BLD-SCNC: 137 MMOL/L (ref 136–145)
WBC # BLD: 4.7 K/UL (ref 4–11)

## 2021-04-20 ENCOUNTER — HOSPITAL ENCOUNTER (INPATIENT)
Age: 66
LOS: 4 days | Discharge: HOME | DRG: 247 | End: 2021-04-24
Attending: INTERNAL MEDICINE | Admitting: INTERNAL MEDICINE
Payer: COMMERCIAL

## 2021-04-20 PROBLEM — I48.19 PERSISTENT ATRIAL FIBRILLATION (HCC): Status: ACTIVE | Noted: 2021-04-20

## 2021-04-20 PROBLEM — I48.91 A-FIB (HCC): Status: ACTIVE | Noted: 2021-04-20

## 2021-04-20 LAB
ANION GAP SERPL CALCULATED.3IONS-SCNC: 11 MMOL/L (ref 3–16)
BUN BLDV-MCNC: 14 MG/DL (ref 7–20)
CALCIUM SERPL-MCNC: 8.9 MG/DL (ref 8.3–10.6)
CHLORIDE BLD-SCNC: 104 MMOL/L (ref 99–110)
CO2: 23 MMOL/L (ref 21–32)
CREAT SERPL-MCNC: 0.8 MG/DL (ref 0.8–1.3)
EKG ATRIAL RATE: 45 BPM
EKG ATRIAL RATE: 72 BPM
EKG DIAGNOSIS: NORMAL
EKG DIAGNOSIS: NORMAL
EKG Q-T INTERVAL: 428 MS
EKG Q-T INTERVAL: 436 MS
EKG QRS DURATION: 100 MS
EKG QRS DURATION: 98 MS
EKG QTC CALCULATION (BAZETT): 382 MS
EKG QTC CALCULATION (BAZETT): 467 MS
EKG R AXIS: 70 DEGREES
EKG R AXIS: 71 DEGREES
EKG T AXIS: 50 DEGREES
EKG T AXIS: 57 DEGREES
EKG VENTRICULAR RATE: 48 BPM
EKG VENTRICULAR RATE: 69 BPM
GFR AFRICAN AMERICAN: >60
GFR NON-AFRICAN AMERICAN: >60
GLUCOSE BLD-MCNC: 116 MG/DL (ref 70–99)
MAGNESIUM: 1.9 MG/DL (ref 1.8–2.4)
POTASSIUM SERPL-SCNC: 3.8 MMOL/L (ref 3.5–5.1)
SODIUM BLD-SCNC: 138 MMOL/L (ref 136–145)

## 2021-04-20 PROCEDURE — 93005 ELECTROCARDIOGRAM TRACING: CPT | Performed by: INTERNAL MEDICINE

## 2021-04-20 PROCEDURE — 2060000000 HC ICU INTERMEDIATE R&B

## 2021-04-20 PROCEDURE — 36415 COLL VENOUS BLD VENIPUNCTURE: CPT

## 2021-04-20 PROCEDURE — 80048 BASIC METABOLIC PNL TOTAL CA: CPT

## 2021-04-20 PROCEDURE — 99223 1ST HOSP IP/OBS HIGH 75: CPT | Performed by: NURSE PRACTITIONER

## 2021-04-20 PROCEDURE — 93010 ELECTROCARDIOGRAM REPORT: CPT | Performed by: INTERNAL MEDICINE

## 2021-04-20 PROCEDURE — 6370000000 HC RX 637 (ALT 250 FOR IP): Performed by: NURSE PRACTITIONER

## 2021-04-20 PROCEDURE — 93005 ELECTROCARDIOGRAM TRACING: CPT | Performed by: NURSE PRACTITIONER

## 2021-04-20 PROCEDURE — 83735 ASSAY OF MAGNESIUM: CPT

## 2021-04-20 RX ORDER — CLOPIDOGREL BISULFATE 75 MG/1
75 TABLET ORAL DAILY
Status: DISCONTINUED | OUTPATIENT
Start: 2021-04-20 | End: 2021-04-24 | Stop reason: HOSPADM

## 2021-04-20 RX ORDER — CLOTRIMAZOLE AND BETAMETHASONE DIPROPIONATE 10; .64 MG/G; MG/G
CREAM TOPICAL 2 TIMES DAILY PRN
Status: DISCONTINUED | OUTPATIENT
Start: 2021-04-20 | End: 2021-04-24 | Stop reason: HOSPADM

## 2021-04-20 RX ORDER — ROSUVASTATIN CALCIUM 20 MG/1
20 TABLET, COATED ORAL NIGHTLY
Status: DISCONTINUED | OUTPATIENT
Start: 2021-04-20 | End: 2021-04-24 | Stop reason: HOSPADM

## 2021-04-20 RX ORDER — POTASSIUM CHLORIDE 20 MEQ/1
20 TABLET, EXTENDED RELEASE ORAL ONCE
Status: COMPLETED | OUTPATIENT
Start: 2021-04-20 | End: 2021-04-20

## 2021-04-20 RX ORDER — ACETAMINOPHEN 325 MG/1
650 TABLET ORAL EVERY 4 HOURS PRN
Status: DISCONTINUED | OUTPATIENT
Start: 2021-04-20 | End: 2021-04-23 | Stop reason: SDUPTHER

## 2021-04-20 RX ORDER — DOFETILIDE 0.5 MG/1
500 CAPSULE ORAL EVERY 12 HOURS SCHEDULED
Status: DISCONTINUED | OUTPATIENT
Start: 2021-04-20 | End: 2021-04-24 | Stop reason: HOSPADM

## 2021-04-20 RX ORDER — FUROSEMIDE 80 MG
80 TABLET ORAL DAILY
Status: DISCONTINUED | OUTPATIENT
Start: 2021-04-20 | End: 2021-04-24 | Stop reason: HOSPADM

## 2021-04-20 RX ORDER — ERGOCALCIFEROL 1.25 MG/1
50000 CAPSULE ORAL WEEKLY
Status: DISCONTINUED | OUTPATIENT
Start: 2021-04-20 | End: 2021-04-24 | Stop reason: HOSPADM

## 2021-04-20 RX ORDER — POTASSIUM CHLORIDE 20 MEQ/1
20 TABLET, EXTENDED RELEASE ORAL DAILY
Status: DISCONTINUED | OUTPATIENT
Start: 2021-04-20 | End: 2021-04-24 | Stop reason: HOSPADM

## 2021-04-20 RX ORDER — LISINOPRIL 2.5 MG/1
2.5 TABLET ORAL DAILY
Status: DISCONTINUED | OUTPATIENT
Start: 2021-04-20 | End: 2021-04-24 | Stop reason: HOSPADM

## 2021-04-20 RX ORDER — METOPROLOL SUCCINATE 50 MG/1
50 TABLET, EXTENDED RELEASE ORAL DAILY
Status: DISCONTINUED | OUTPATIENT
Start: 2021-04-20 | End: 2021-04-22

## 2021-04-20 RX ORDER — ASPIRIN 81 MG/1
81 TABLET ORAL DAILY
Status: DISCONTINUED | OUTPATIENT
Start: 2021-04-20 | End: 2021-04-24 | Stop reason: HOSPADM

## 2021-04-20 RX ORDER — IBUPROFEN 400 MG/1
800 TABLET ORAL 2 TIMES DAILY PRN
Status: DISCONTINUED | OUTPATIENT
Start: 2021-04-20 | End: 2021-04-23

## 2021-04-20 RX ORDER — TAMSULOSIN HYDROCHLORIDE 0.4 MG/1
0.4 CAPSULE ORAL 2 TIMES DAILY
Status: DISCONTINUED | OUTPATIENT
Start: 2021-04-20 | End: 2021-04-24 | Stop reason: HOSPADM

## 2021-04-20 RX ORDER — LANOLIN ALCOHOL/MO/W.PET/CERES
400 CREAM (GRAM) TOPICAL ONCE
Status: COMPLETED | OUTPATIENT
Start: 2021-04-20 | End: 2021-04-20

## 2021-04-20 RX ADMIN — FUROSEMIDE 80 MG: 80 TABLET ORAL at 09:41

## 2021-04-20 RX ADMIN — TAMSULOSIN HYDROCHLORIDE 0.4 MG: 0.4 CAPSULE ORAL at 09:42

## 2021-04-20 RX ADMIN — POTASSIUM CHLORIDE 20 MEQ: 20 TABLET, EXTENDED RELEASE ORAL at 09:41

## 2021-04-20 RX ADMIN — ERGOCALCIFEROL 50000 UNITS: 1.25 CAPSULE ORAL at 09:54

## 2021-04-20 RX ADMIN — TAMSULOSIN HYDROCHLORIDE 0.4 MG: 0.4 CAPSULE ORAL at 17:03

## 2021-04-20 RX ADMIN — ROSUVASTATIN CALCIUM 20 MG: 20 TABLET, FILM COATED ORAL at 21:13

## 2021-04-20 RX ADMIN — LISINOPRIL 2.5 MG: 2.5 TABLET ORAL at 09:54

## 2021-04-20 RX ADMIN — ASPIRIN 81 MG: 81 TABLET, COATED ORAL at 09:41

## 2021-04-20 RX ADMIN — CLOPIDOGREL BISULFATE 75 MG: 75 TABLET, FILM COATED ORAL at 09:42

## 2021-04-20 RX ADMIN — DOFETILIDE 500 MCG: 0.5 CAPSULE ORAL at 21:13

## 2021-04-20 RX ADMIN — Medication 400 MG: at 09:41

## 2021-04-20 RX ADMIN — DOFETILIDE 500 MCG: 0.5 CAPSULE ORAL at 09:54

## 2021-04-20 RX ADMIN — POTASSIUM CHLORIDE 20 MEQ: 1500 TABLET, EXTENDED RELEASE ORAL at 09:42

## 2021-04-20 ASSESSMENT — PAIN SCALES - GENERAL
PAINLEVEL_OUTOF10: 0

## 2021-04-20 NOTE — PROGRESS NOTES
24 beats of V tach noted on tele. Pt asymptomatic. VSS. Miriam Macias CNP made aware.  Electronically signed by Tonya Saeed RN on 4/20/2021 at 12:55 PM

## 2021-04-20 NOTE — H&P
[address]   Electrophysiology   H&P    Date: 4/20/2021    History Obtained From: Patient and medical record. Chief Complaint: pAF, CMP    Cardiac Hx: Megan Almaraz is a 77 y.o. man with a h/o HTN, HLD, NATHAN on CPAP, CAD, s/p PCI (2006), follows with Dr. Andrea Doss, pAF ongoing since 2007, s/p unsuccessful DCCV (3/16/2021), EF dropped to 45%, MPI showed no reversible ischemia and an EF of 37%. Today: Patient with ongoing pAF x 10 years with at least 1 episode per year. Had recurrent AF and underwent DCCV that did not convert in March and patient is being admitted for dofetilide loading. EF noted to have dropped on YVROSE. Patient with ongoing LE edema that improves with lasix. Does c/o fatigue and SOB when in AF. Denies CP. Home medications:   No current facility-administered medications on file prior to encounter. Current Outpatient Medications on File Prior to Encounter   Medication Sig Dispense Refill    apixaban (ELIQUIS) 5 MG TABS tablet Take 1 tablet by mouth 2 times daily 180 tablet 3    clopidogrel (PLAVIX) 75 MG tablet TAKE 1 TABLET BY MOUTH  DAILY 90 tablet 3    metoprolol succinate (TOPROL XL) 50 MG extended release tablet Take 1 tablet by mouth daily 90 tablet 3    potassium chloride (KLOR-CON M) 20 MEQ extended release tablet Take 1 tablet by mouth daily 90 tablet 3    rosuvastatin (CRESTOR) 20 MG tablet Take 1 tablet by mouth nightly 90 tablet 3    furosemide (LASIX) 80 MG tablet TAKE 1 TABLET BY MOUTH  DAILY 90 tablet 2    TURMERIC PO Take 40 mg by mouth 2 times daily      Specialty Vitamins Products (PROSTATE PO) Take 40 mg by mouth 2 times daily      Cranberry 600 MG TABS Take by mouth 3 times daily      aspirin 81 MG tablet Take 81 mg by mouth daily      tamsulosin (FLOMAX) 0.4 MG capsule Take 0.4 mg by mouth 2 times daily.       ibuprofen (ADVIL;MOTRIN) 800 MG tablet Take 1 tablet by mouth 2 times daily as needed for Pain 60 tablet 2    vitamin D (ERGOCALCIFEROL) 1.25 MG (99864 UT) CAPS capsule TAKE ONE CAPSULE BY MOUTH ONE TIME PER WEEK 4 capsule 5    clotrimazole-betamethasone (LOTRISONE) 1-0.05 % cream APPLY TOPICALLY 2 TIMES DAILY. 45 g 1       Scheduled Meds:   apixaban  5 mg Oral BID    aspirin  81 mg Oral Daily    clopidogrel  75 mg Oral Daily    furosemide  80 mg Oral Daily    metoprolol succinate  50 mg Oral Daily    potassium chloride  20 mEq Oral Daily    rosuvastatin  20 mg Oral Nightly    tamsulosin  0.4 mg Oral BID    vitamin D  50,000 Units Oral Weekly     Continuous Infusions:  PRN Meds:acetaminophen, clotrimazole-betamethasone, ibuprofen       Past Medical History:   Diagnosis Date    CAD (coronary artery disease)     Hyperlipidemia     Hypertension     S/P colonoscopy sept 2010    BN    Shoulder injury     LEFT & RIGHT /Dr Mary Blunt (football)    Torn meniscus     LT /Dr Mary Blunt (football)        Past Surgical History:   Procedure Laterality Date    BACK SURGERY Bilateral 2014    SCIATIC NERVE     COLONOSCOPY N/A 9/3/2019    COLONOSCOPY WITH BIOPSY performed by Julius Claude, MD at [facility]  2006    x2    ENTEROSCOPY N/A 10/1/2019    PUSH ENTEROSCOPY BIOPSY SMALL BOWEL performed by Julius Claude, MD at . [facility] [address] Left 07/2019    [address] - TRIGGER    HAND SURGERY  2005,2010    thumb ,nando trigger    HEMORRHOID SURGERY  2010    KNEE SURGERY  4001,[id number],5699    RT x 3 one scope & 2 major    TOTAL KNEE ARTHROPLASTY Right 12/2014    UPPER GASTROINTESTINAL ENDOSCOPY N/A 9/25/2018    EGD BIOPSY GASTRIC FOR H PYLORI performed by Julius Claude, MD at [facility] 12/18/2018    EGD BIOPSY performed by Julius Claude, MD at [facility] 9/3/2019    EGD BIOPSY performed by Julius Claude, MD at [address] ENDOSCOPY       No Known Allergies    Social History:  Reviewed. reports that he has never smoked. He has never used smokeless tobacco. He reports that he does not drink alcohol or use drugs. Family History:  Reviewed. family history includes Cancer in his mother; Other in his father. Review of System:    · Constitutional: No fevers, chills. · Eyes: No visual changes or diplopia. No scleral icterus. · ENT: No Headaches. No mouth sores or sore throat. · Cardiovascular: No for chest pain, Yes for dyspnea on exertion, Yes for palpitations or No for loss of consciousness. No cough, hemoptysis, No for pleuritic pain, or phlebitis. · Respiratory: No for cough or wheezing. No hematemesis. · Gastrointestinal: No abdominal pain, blood in stools. · Genitourinary: No dysuria, or hematuria. · Musculoskeletal: No gait disturbance,    · Integumentary: No rash or pruritis. · Neurological: No headache, change in muscle strength, numbness or tingling. · Psychiatric: No anxiety, or depression. · Endocrine: No temperature intolerance. No excessive thirst, fluid intake, or urination. · Hem/Lymph: No abnormal bruising or bleeding, blood clots or swollen lymph nodes. · Allergic/Immunologic: No nasal congestion or hives. Physical Examination:  Vitals:    21 0721   BP: 114/76   Pulse: 60   Resp: 17   Temp: 96.6 °F (35.9 °C)   SpO2: 97%      No intake/output data recorded. Wt Readings from Last 3 Encounters:   21 271 lb (122.9 kg)   21 287 lb 12.8 oz (130.5 kg)   21 267 lb (121.1 kg)     Temp  Av.6 °F (35.9 °C)  Min: 96.6 °F (35.9 °C)  Max: 96.6 °F (35.9 °C)  Pulse  Av  Min: 60  Max: 60  BP  Min: 114/76  Max: 114/76  SpO2  Av %  Min: 97 %  Max: 97 %  No intake or output data in the 24 hours ending 21 0814    · Constitutional: Oriented. No distress. · Head: Normocephalic and atraumatic. · Mouth/Throat: Oropharynx is clear and moist.   · Eyes: Conjunctivae clear without jaunduice. PERRL. · Neck: Neck supple. No rigidity. No JVD present.     · Cardiovascular: Normal rate, regular rhythm, S1&S2. · Pulmonary/Chest: Bilateral respiratory sounds. No wheezes, No rhonchi. · Abdominal: Soft. Bowel sounds present. No distension, No tenderness. · Musculoskeletal: No tenderness. 2+ bilat LE edema    · Lymphadenopathy: Has no cervical adenopathy. · Neurological: Alert and oriented. Cranial nerve appears intact, No Gross deficit   · Skin: Skin is warm and dry. No rash noted. · Psychiatric: Has a normal mood, affect and behavior     Labs:  Reviewed. No results for input(s): NA, K, CL, CO2, PHOS, BUN, CREATININE in the last 72 hours. Invalid input(s): CA,  TSH  No results for input(s): WBC, HGB, HCT, MCV, PLT in the last 72 hours. Lab Results   Component Value Date    CKTOTAL 117 07/31/2012    TROPONINI <0.01 02/09/2021     No results found for: BNP  Lab Results   Component Value Date    PROTIME 14.2 04/15/2021    PROTIME 14.7 03/04/2021    PROTIME 12.3 06/25/2014    INR 1.22 04/15/2021    INR 1.26 03/04/2021    INR 0.9 06/25/2014     Lab Results   Component Value Date    CHOL 143 03/10/2021    HDL 51 03/10/2021    TRIG 66 03/10/2021       Telemetry: Personally reviewed: AF - HR controlled, beckie    Radiography: Personally reviewed: n/a    ECG: Personally reviewed: AF, HR 45, QRS 98, QTc 382    ECHO:  3/16/2021  Summary   Limited study. .   There is mildly increased left ventricular wall thickness. Overall left ventricular systolic function appears mildly reduced with   estimated LVEF of 45%. Mitral valve prolapse is present. Mild to moderate mitral regurgitation. The left atrial size appears dilated. There is no evidence of mass or   thrombus in the left atrium or appendage however there is spontaneous   contrast seen. No intracardiac thrombus was seen on this study. Stress Test: 3/23/21  Summary   [place] is a small fixed perfusion defect at the inferoapical wall of the Left    ventricle consistent with infarction.  There is no perfusion defect to  suggest ischemia.    The LVEF is reduced at 37% with global mild hypokinesis and moderate    hypokinesis at the inferoapical wall.    The TID is normal at 0.99.        This is an intermediate risk cardiac scan. Cardiac Angiography:none recent    Problem List:   Patient Active Problem List    Diagnosis Date Noted    A-fib [facility]) 04/20/2021    Persistent atrial fibrillation ([facility] [address] 04/20/2021    Morbidly obese ([facility] [address] 10/26/2020    Localized edema 11/14/2017    Hypertension 01/30/2013    Other tenosynovitis of hand and wrist 12/12/2012    Hypertrophy of prostate without urinary obstruction and other lower urinary tract symptoms (LUTS) 03/17/2010    Coronary atherosclerosis 10/05/2007    Other and unspecified hyperlipidemia 10/05/2007    Candace Dire cyst [facility]) 11/22/2002        Assessment:   1. Persistent AF  2. CMP  3. Chronic sCHF    Cardiac Hx: Megan Almaraz is a 77 y.o. man with a h/o HTN, HLD, NATHAN on CPAP, CAD, s/p PCI (2006), follows with Dr. Andrea Doss, pAF ongoing since 2007, s/p unsuccessful DCCV (3/16/2021), EF dropped to 45%, MPI showed no reversible ischemia and an EF of 37%. [id number] 4. TSH 2.37 (2/9/21). AF  - In AF - HR in the 50-60 bpm range  - S/p YVROSE/DCCV - not susuccesful  - On Eliquis 5 mg BID - no s/s bleeding - continue  - Will start on dofetilide 500 mcg BID - reviewed dosing with Dr. Analia Person  - Keep K+ > 4.0 and Mg > 2.0  - NPO for possible DCCV on Wednesday if does not convert on his own  - ECG ordered and results personally reviewed     CMP/chronic sCHF  - EF 45%  - NYHA class I/II  - QRS 98  - Possibly tachycardia induced  - On Toprol XL 50 mg QD  - Will add low dose ACE  - BMP daily    EF of 00%  ACEi for systolic HF  ASA and Statin for CAD  Anticoagulation for AF and heart failure  No Tobacco use. All questions and concerns were addressed to the patient/family. Alternatives to my treatment were discussed. The note was completed using EMR.  Every effort was made to

## 2021-04-20 NOTE — PROGRESS NOTES
4 Eyes Admission Assessment     I agree as the admission nurse that 2 RN's have performed a thorough Head to Toe Skin Assessment on the patient. ALL assessment sites listed below have been assessed on admission. Areas assessed by both nurses:   [x]   Head, Face, and Ears   [x]   Shoulders, Back, and Chest  [x]   Arms, Elbows, and Hands   [x]   Coccyx, Sacrum, and Ischium  [x]   Legs, Feet, and Heels     All areas intact. Does the Patient have Skin Breakdown?   No.        Claudio Prevention initiated:  No.  Wound Care Orders initiated:  No.      [facility] nurse consulted for Pressure Injury (Stage 3,4, Unstageable, DTI, NWPT, and Complex wounds) or Claudio score 18 or lower:  No       Nurse 1 eSignature: Electronically signed by Donna Soto RN on 4/20/21 at 10:55 AM EDT    **SHARE this note so that the co-signing nurse is able to place an eSignature**    Nurse 2 eSignature: Electronically signed by Stacey Aguilar RN on 4/20/21 at 12:40 PM EDT

## 2021-04-20 NOTE — H&P (VIEW-ONLY)
[address]   Electrophysiology   H&P    Date: 4/20/2021    History Obtained From: Patient and medical record. Chief Complaint: pAF, CMP    Cardiac Hx: Philis Ormond is a 77 y.o. man with a h/o HTN, HLD, NATHAN on CPAP, CAD, s/p PCI (2006), follows with Dr. Dylon Alberto, pAF ongoing since 2007, s/p unsuccessful DCCV (3/16/2021), EF dropped to 45%, MPI showed no reversible ischemia and an EF of 37%. Today: Patient with ongoing pAF x 10 years with at least 1 episode per year. Had recurrent AF and underwent DCCV that did not convert in March and patient is being admitted for dofetilide loading. EF noted to have dropped on YVROSE. Patient with ongoing LE edema that improves with lasix. Does c/o fatigue and SOB when in AF. Denies CP. Home medications:   No current facility-administered medications on file prior to encounter. Current Outpatient Medications on File Prior to Encounter   Medication Sig Dispense Refill    apixaban (ELIQUIS) 5 MG TABS tablet Take 1 tablet by mouth 2 times daily 180 tablet 3    clopidogrel (PLAVIX) 75 MG tablet TAKE 1 TABLET BY MOUTH  DAILY 90 tablet 3    metoprolol succinate (TOPROL XL) 50 MG extended release tablet Take 1 tablet by mouth daily 90 tablet 3    potassium chloride (KLOR-CON M) 20 MEQ extended release tablet Take 1 tablet by mouth daily 90 tablet 3    rosuvastatin (CRESTOR) 20 MG tablet Take 1 tablet by mouth nightly 90 tablet 3    furosemide (LASIX) 80 MG tablet TAKE 1 TABLET BY MOUTH  DAILY 90 tablet 2    TURMERIC PO Take 40 mg by mouth 2 times daily      Specialty Vitamins Products (PROSTATE PO) Take 40 mg by mouth 2 times daily      Cranberry 600 MG TABS Take by mouth 3 times daily      aspirin 81 MG tablet Take 81 mg by mouth daily      tamsulosin (FLOMAX) 0.4 MG capsule Take 0.4 mg by mouth 2 times daily.       ibuprofen (ADVIL;MOTRIN) 800 MG tablet Take 1 tablet by mouth 2 times daily as needed for Pain 60 tablet 2    vitamin D (ERGOCALCIFEROL) 1.25 MG (67308 UT) CAPS capsule TAKE ONE CAPSULE BY MOUTH ONE TIME PER WEEK 4 capsule 5    clotrimazole-betamethasone (LOTRISONE) 1-0.05 % cream APPLY TOPICALLY 2 TIMES DAILY. 45 g 1       Scheduled Meds:   apixaban  5 mg Oral BID    aspirin  81 mg Oral Daily    clopidogrel  75 mg Oral Daily    furosemide  80 mg Oral Daily    metoprolol succinate  50 mg Oral Daily    potassium chloride  20 mEq Oral Daily    rosuvastatin  20 mg Oral Nightly    tamsulosin  0.4 mg Oral BID    vitamin D  50,000 Units Oral Weekly     Continuous Infusions:  PRN Meds:acetaminophen, clotrimazole-betamethasone, ibuprofen       Past Medical History:   Diagnosis Date    CAD (coronary artery disease)     Hyperlipidemia     Hypertension     S/P colonoscopy sept 2010    BN    Shoulder injury     LEFT & RIGHT /Dr Mike Barahona (football)    Torn meniscus     LT /Dr Mike Barahona (football)        Past Surgical History:   Procedure Laterality Date    BACK SURGERY Bilateral 2014    SCIATIC NERVE     COLONOSCOPY N/A 9/3/2019    COLONOSCOPY WITH BIOPSY performed by Raymond [facility] at [facility]  2006    x2    ENTEROSCOPY N/A 10/1/2019    PUSH ENTEROSCOPY BIOPSY SMALL BOWEL [facility] Left 07/2019    [address] - TRIGGER    HAND SURGERY  2005,2010    thumb ,nando trigger    HEMORRHOID SURGERY  2010    KNEE SURGERY  [id number]    RT x 3 one scope & 2 major    TOTAL KNEE ARTHROPLASTY Right 12/2014    UPPER GASTROINTESTINAL ENDOSCOPY N/A 9/25/2018    EGD BIOPSY GASTRIC FOR H PYLORI performed by Raymond Mijares MD at [facility] 12/18/2018    EGD BIOPSY performed by Raymond Mijares MD at [facility] 9/3/2019    EGD BIOPSY performed by Raymond Mijares MD at [facility] ENDOSCOPY       No Known Allergies    Social History:  Reviewed. reports that he has never smoked. He has never used smokeless tobacco. He reports that he does not drink alcohol or use drugs. Family History:  Reviewed. family history includes Cancer in his mother; Other in his father. Review of System:    · Constitutional: No fevers, chills. · Eyes: No visual changes or diplopia. No scleral icterus. · ENT: No Headaches. No mouth sores or sore throat. · Cardiovascular: No for chest pain, Yes for dyspnea on exertion, Yes for palpitations or No for loss of consciousness. No cough, hemoptysis, No for pleuritic pain, or phlebitis. · Respiratory: No for cough or wheezing. No hematemesis. · Gastrointestinal: No abdominal pain, blood in stools. · Genitourinary: No dysuria, or hematuria. · Musculoskeletal: No gait disturbance,    · Integumentary: No rash or pruritis. · Neurological: No headache, change in muscle strength, numbness or tingling. · Psychiatric: No anxiety, or depression. · Endocrine: No temperature intolerance. No excessive thirst, fluid intake, or urination. · Hem/Lymph: No abnormal bruising or bleeding, blood clots or swollen lymph nodes. · Allergic/Immunologic: No nasal congestion or hives. Physical Examination:  Vitals:    21 0721   BP: 114/76   Pulse: 60   Resp: 17   Temp: 96.6 °F (35.9 °C)   SpO2: 97%      No intake/output data recorded. Wt Readings from Last 3 Encounters:   21 271 lb (122.9 kg)   21 287 lb 12.8 oz (130.5 kg)   21 267 lb (121.1 kg)     Temp  Av.6 °F (35.9 °C)  Min: 96.6 °F (35.9 °C)  Max: 96.6 °F (35.9 °C)  Pulse  Av  Min: 60  Max: 60  BP  Min: 114/76  Max: 114/76  SpO2  Av %  Min: 97 %  Max: 97 %  No intake or output data in the 24 hours ending 21 0814    · Constitutional: Oriented. No distress. · Head: Normocephalic and atraumatic. · Mouth/Throat: Oropharynx is clear and moist.   · Eyes: Conjunctivae clear without jaunduice. PERRL. · Neck: Neck supple. No rigidity. No JVD present.     · Cardiovascular: Normal rate, regular rhythm, S1&S2. · Pulmonary/Chest: Bilateral respiratory sounds. No wheezes, No rhonchi. · Abdominal: Soft. Bowel sounds present. No distension, No tenderness. · Musculoskeletal: No tenderness. 2+ bilat LE edema    · Lymphadenopathy: Has no cervical adenopathy. · Neurological: Alert and oriented. Cranial nerve appears intact, No Gross deficit   · Skin: Skin is warm and dry. No rash noted. · Psychiatric: Has a normal mood, affect and behavior     Labs:  Reviewed. No results for input(s): NA, K, CL, CO2, PHOS, BUN, CREATININE in the last 72 hours. Invalid input(s): CA,  TSH  No results for input(s): WBC, HGB, HCT, MCV, PLT in the last 72 hours. Lab Results   Component Value Date    CKTOTAL 117 07/31/2012    TROPONINI <0.01 02/09/2021     No results found for: BNP  Lab Results   Component Value Date    PROTIME 14.2 04/15/2021    PROTIME 14.7 03/04/2021    PROTIME 12.3 06/25/2014    INR 1.22 04/15/2021    INR 1.26 03/04/2021    INR 0.9 06/25/2014     Lab Results   Component Value Date    CHOL 143 03/10/2021    HDL 51 03/10/2021    TRIG 66 03/10/2021       Telemetry: Personally reviewed: AF - HR controlled, beckie    Radiography: Personally reviewed: n/a    ECG: Personally reviewed: AF, HR 45, QRS 98, QTc 382    ECHO:  3/16/2021  Summary   Limited study. .   There is mildly increased left ventricular wall thickness. Overall left ventricular systolic function appears mildly reduced with   estimated LVEF of 45%. Mitral valve prolapse is present. Mild to moderate mitral regurgitation. The left atrial size appears dilated. There is no evidence of mass or   thrombus in the left atrium or appendage however there is spontaneous   contrast seen. No intracardiac thrombus was seen on this study. Stress Test: 3/23/21  Summary   Ceil Andreia is a small fixed perfusion defect at the inferoapical wall of the Left    ventricle consistent with infarction.  There is no perfusion defect to  suggest ischemia.    The LVEF is reduced at 37% with global mild hypokinesis and moderate    hypokinesis at the inferoapical wall.    The TID is normal at 0.99.        This is an intermediate risk cardiac scan. Cardiac Angiography:none recent    Problem List:   Patient Active Problem List    Diagnosis Date Noted    A-fib [facility]) 04/20/2021    Persistent atrial fibrillation ([facility] 04/20/2021    Morbidly obese ([facility] 10/26/2020    Localized edema 11/14/2017    Hypertension 01/30/2013    Other tenosynovitis of hand and wrist 12/12/2012    Hypertrophy of prostate without urinary obstruction and other lower urinary tract symptoms (LUTS) 03/17/2010    Coronary atherosclerosis 10/05/2007    Other and unspecified hyperlipidemia 10/05/2007    Susanne Scheuermann cyst [facility]) 11/22/2002        Assessment:   1. Persistent AF  2. CMP  3. Chronic sCHF    Cardiac Hx: Erik Betancourt is a 77 y.o. man with a h/o HTN, HLD, NATHAN on CPAP, CAD, s/p PCI (2006), follows with Dr. Janell Sanders, pAF ongoing since 2007, s/p unsuccessful DCCV (3/16/2021), EF dropped to 45%, MPI showed no reversible ischemia and an EF of 37%. [id number] 4. TSH 2.37 (2/9/21). AF  - In AF - HR in the 50-60 bpm range  - S/p YVROSE/DCCV - not susuccesful  - On Eliquis 5 mg BID - no s/s bleeding - continue  - Will start on dofetilide 500 mcg BID - reviewed dosing with Dr. Angel Villarreal  - Keep K+ > 4.0 and Mg > 2.0  - NPO for possible DCCV on Wednesday if does not convert on his own  - ECG ordered and results personally reviewed     CMP/chronic sCHF  - EF 45%  - NYHA class I/II  - QRS 98  - Possibly tachycardia induced  - On Toprol XL 50 mg QD  - Will add low dose ACE  - BMP daily    EF of 90%  ACEi for systolic HF  ASA and Statin for CAD  Anticoagulation for AF and heart failure  No Tobacco use. All questions and concerns were addressed to the patient/family. Alternatives to my treatment were discussed. The note was completed using EMR.  Every effort was made to ensure accuracy; however, inadvertent computerized transcription errors may be present.        Aurora Wong 115

## 2021-04-20 NOTE — CONSULTS
Clinical Pharmacy Progress Note    ADMIT DATE: 4/20/2021       LABORATORY:  Recent Labs     04/20/21  0811      K 3.8      CO2 23   BUN 14   CREATININE 0.8   GLUCOSE 116*       Estimated Creatinine Clearance: 123 mL/min (based on SCr of 0.8 mg/dL). Magnesium 1.9    ASSESSMENT/PLAN:  1)  Dofetilide Loading --   · Electrolytes--  · Pharmacy asked to assist with electrolyte replacement. Goal K > 4, Mg > 2.  · Electrolytes are slightly below goal.  · K = 3.8 - will replace with KCl 40meq PO x1 (20meq home dose + extra 20meq)   · Mg = 1.9 - will replace with magnesium oxide 400mg PO x1   · Pharmacy will continue to monitor and replace electrolytes as appropriate. · Dofetilide--  · Pharmacy asked to assist with dosing for Dofetilide. · See below for dosing guidelines. · Baseline QTc = 382. · Recommend Dofetilide 500 mcg as initial dose. · EKG to be obtained 2 hours after each dose of Dofetilide. Subsequent doses will be determined based on QTc interval.   · Pharmacy will continue to monitor and provide dosing recommendations as appropriate. Please call with any questions. Dofetilide Dosing Guidelines:  Before Initiating Dofetilide (Tikosyn):   Previous anti-arrhythmic therapy should be discontinued a minimum of 3 half-lives. Tikosyn should not be initiated following amiodarone therapy until amiodarone levels are < 0.3 mcg/mL or amiodarone withdrawn at least 3 months.    Patient must be admitted to telemetry unit and with a telemetry lead with a visible QT interval   Telemetry should be continued for minimum of 3 days or for 12 hours after conversion to normal sinus rhythm (whichever is longer)   The following medications are contraindicated in combination with dofetilide:  o Verapamil, hydrochlorothiazide, cimetidine, trimethoprim (including Bactrim), ketoconazole (as these drugs can cause significant increase in dofetilide plasma levels)  o Other medications which should not be used include prochlorperazine and megestrol   Serum potassium levels should be > 4 meQ/L prior to administration    Initiating Dofetilide (Tikosyn):    Obtain baseline EKG - QTc interval should be calculated    If baseline QTc is > 440 msec (500 msec in patients with ventricular conduction abnormalities), Tikosyn is contraindicated  o Note time, date and telemetry lead on strip  o All measurements of QTc interval should be from this lead   Measure the QT interval (QTc) 2-3 hours after each dose of Tikosyn until the patient is discharged     Dosing   Calculated creatinine clearance should be calculated using actual body weight  o If calculated CrCl > 60 mL/min, the appropriate dose of Tikosyn is 500 mcg PO BID  o 2-3 hours after initial dose, if QTc increases to > 15% from baseline, then Tikosyn should be decreased to 250 mcg BID   The second dose of Tikosyn should be given after the QT has been determined.  Only 1 down titration of Tikosyn for QTc is suggested.  If QTc is still excessively prolonged, Tikosyn should be discontinued   During therapy initiation in the hospital, at 2-3 hours after each dose of Tikosyn, determine the QTc to see if dose adjustment is necessary   Tikosyn should be given q12h at the same time every day    Prior to Patient Discharge --    Patient should receive a Tikosyn discharge bottle with 14 capsules (7 day supply)   Medication Guide should be reviewed with patient

## 2021-04-20 NOTE — PROGRESS NOTES
Patient arrived promptly from home at 0700. Patient oriented to room and placed on telemetry. NP notified of patient's arrival. Skin assessed by 2 RNs.  Electronically signed by Megan Ramesh RN on 4/20/2021 at 10:53 AM

## 2021-04-20 NOTE — CARE COORDINATION
Case Management Assessment           Initial Evaluation                Date / Time of Evaluation: 4/20/2021 3:13 PM                 Assessment Completed by: Camacho Peguero    Patient Name: Bertina Alpers     YOB: 1955  Diagnosis: A-fib [facility]) [I48.91]  Persistent atrial fibrillation ([facility] [address] [I48.19]     Date / Time: 4/20/2021  7:00 AM    Patient Admission Status: Inpatient    If patient is discharged prior to next notation, then this note serves as note for discharge by case management. Current PCP: Shana Covington MD  Clinic Patient: Yes    Chart Reviewed: Yes  Patient/ Family Interviewed: Yes    Initial assessment completed at bedside with: patient    Hospitalization in the last 30 days: No    Emergency Contacts:  Extended Emergency Contact Information  Primary Emergency Contact: Svitlana Patrick  Address: [address] [place] [address] Phone: [phone]  Mobile Phone: [phone]  Relation: Spouse    Advance Directives:   Code Status: Prior        Financial  Payor: UMR / Plan: UMR  / Product Type: *No Product type* /     Pre-cert required for SNF: Yes    Pharmacy    [address] [facility] [phone] -  [phone]  [place] [address]  Phone: [phone] Fax: [phone]    [facility]/pharmacy #[id number] [place] [address]  Phone: [phone] Fax: [phone]      Potential assistance Purchasing Medications: Potential Assistance Purchasing Medications: No  Does Patient want to participate in local refill/ meds to beds program?: No    Meds To Beds General Rules:  1. Can ONLY be done Monday- Friday between 8:30am-5pm  2. Prescription(s) must be in pharmacy by 3pm to be filled same day  3. Copy of patient's insurance/ prescription drug card and patient face sheet must be sent along with the prescription(s)  4. Cost of Rx cannot be added to hospital bill. If financial assistance is needed, please contact unit  or ;  or  CANNOT provide pharmacy voucher for patients co-pays  5. Patients can then  the prescription on their way out of the hospital at discharge, or pharmacy can deliver to the bedside if staff is available. (payment due at time of pick-up or delivery - cash, check, or card accepted)     Able to afford home medications/ co-pay costs: Yes    ADLS  Support Systems: Spouse/Significant Other    PT AM-PAC:   /24  OT AM-PAC:   /24    [place]: from home with spouse  Steps:     Plans to RETURN to current housing: Yes  Barriers to RETURNING to current housing: none    [address]  Currently ACTIVE with [address]: No  Home Care Agency: Not Applicable          Durable Medical Equipment  DME Provider: n/a  Equipment: n/a    Home Oxygen and [address] [place] prior to admission: No  [address]: Not Applicable  Other Respiratory Equipment: cpap        Dialysis  Active with HD/PD prior to admission: No  Nephrologist:     HD Center:  Not Applicable    DISCHARGE PLAN:  Disposition: Home- No Services Needed    Transportation PLAN for discharge: family     Factors facilitating achievement of predicted outcomes: Family support, Cooperative and Pleasant    Barriers to discharge: Medical complications    Additional Case Management Notes:   Patient is from home with spouse, independent pta. No CM needs at this time. Spouse to transport home at discharge.     The Plan for Transition of Care is related to the following treatment goals of A-fib [facility]) [I48.91]  Persistent atrial fibrillation ([facility] [address] [I48.19]    The Patient and/or patient representative Isiah Marlow and his family were provided with a choice of provider and agrees with the discharge plan Yes    Freedom of choice list was provided with basic dialogue that supports the patient's individualized plan of care/goals and shares the quality data associated with the providers.  Yes    Care Transition patient: No    Ulises Gusman RN  [facility]  Case Management Department  Ph: [phone]   Fax: [phone]

## 2021-04-20 NOTE — PLAN OF CARE
Patient seen and examined  Patient was admitted to the hospital for Tikosyn initiation for rhythm control strategy  By 12:45 PM, he had an episode of ventricular tachycardia, lasting for less than 30 seconds (about 28 beats in total). He was symptomatic with palpitations and lightheadedness. He had 1 other episode of nonsustained VT by around 2:15 PM, lasting for 6 beats. His LV ejection fraction was reported to be 45%, based on transesophageal echocardiogram  Lexiscan from March 2021 showed LV ejection fraction of 37% and inferoapical fixed perfusion defect  There is no reversible ischemia noted in the stress test  He had PCI to LAD in 2006    Secondary to nonsustained ventricular tachycardia, I am concerned about any underlying ischemia.   Discussed with patient in detail about this finding    Plan:  Continue Tikosyn loading  Needs cardiac catheterization  Hence, I am canceling cardioversion and holding Eliquis from tonight onwards  Discussed with Dr. Arnaud Gould over the phone  Plan for cardiac catheterization on Friday (Eliquis has to be on hold at least for 2 days)  Transthoracic echocardiogram tomorrow to evaluate LV ejection fraction and any regional wall motion changes (last transthoracic echo was from 2017)  Continue Toprol-XL 50 mg daily  Continue aspirin and Plavix    Chai Barnard MD   Cardiac Electrophysiology  [address] [place] [address] [phone]

## 2021-04-21 ENCOUNTER — HOSPITAL ENCOUNTER (OUTPATIENT)
Dept: CARDIAC CATH/INVASIVE PROCEDURES | Age: 66
DRG: 247 | End: 2021-04-21
Attending: INTERNAL MEDICINE
Payer: COMMERCIAL

## 2021-04-21 LAB
ANION GAP SERPL CALCULATED.3IONS-SCNC: 9 MMOL/L (ref 3–16)
BUN BLDV-MCNC: 12 MG/DL (ref 7–20)
CALCIUM SERPL-MCNC: 8.9 MG/DL (ref 8.3–10.6)
CHLORIDE BLD-SCNC: 105 MMOL/L (ref 99–110)
CO2: 24 MMOL/L (ref 21–32)
CREAT SERPL-MCNC: 0.9 MG/DL (ref 0.8–1.3)
EKG ATRIAL RATE: 56 BPM
EKG ATRIAL RATE: 67 BPM
EKG DIAGNOSIS: NORMAL
EKG DIAGNOSIS: NORMAL
EKG P AXIS: 54 DEGREES
EKG P-R INTERVAL: 198 MS
EKG P-R INTERVAL: 202 MS
EKG Q-T INTERVAL: 484 MS
EKG Q-T INTERVAL: 508 MS
EKG QRS DURATION: 102 MS
EKG QRS DURATION: 98 MS
EKG QTC CALCULATION (BAZETT): 490 MS
EKG QTC CALCULATION (BAZETT): 511 MS
EKG R AXIS: 118 DEGREES
EKG R AXIS: 68 DEGREES
EKG T AXIS: 125 DEGREES
EKG T AXIS: 64 DEGREES
EKG VENTRICULAR RATE: 56 BPM
EKG VENTRICULAR RATE: 67 BPM
GFR AFRICAN AMERICAN: >60
GFR NON-AFRICAN AMERICAN: >60
GLUCOSE BLD-MCNC: 100 MG/DL (ref 70–99)
INR BLD: 1.12 (ref 0.86–1.14)
MAGNESIUM: 2 MG/DL (ref 1.8–2.4)
POTASSIUM SERPL-SCNC: 4 MMOL/L (ref 3.5–5.1)
PROTHROMBIN TIME: 13 SEC (ref 10–13.2)
SODIUM BLD-SCNC: 138 MMOL/L (ref 136–145)

## 2021-04-21 PROCEDURE — 6370000000 HC RX 637 (ALT 250 FOR IP): Performed by: NURSE PRACTITIONER

## 2021-04-21 PROCEDURE — 2060000000 HC ICU INTERMEDIATE R&B

## 2021-04-21 PROCEDURE — 93010 ELECTROCARDIOGRAM REPORT: CPT | Performed by: INTERNAL MEDICINE

## 2021-04-21 PROCEDURE — 80048 BASIC METABOLIC PNL TOTAL CA: CPT

## 2021-04-21 PROCEDURE — 93005 ELECTROCARDIOGRAM TRACING: CPT | Performed by: INTERNAL MEDICINE

## 2021-04-21 PROCEDURE — 83735 ASSAY OF MAGNESIUM: CPT

## 2021-04-21 PROCEDURE — 36415 COLL VENOUS BLD VENIPUNCTURE: CPT

## 2021-04-21 PROCEDURE — 85610 PROTHROMBIN TIME: CPT

## 2021-04-21 PROCEDURE — 99232 SBSQ HOSP IP/OBS MODERATE 35: CPT | Performed by: NURSE PRACTITIONER

## 2021-04-21 RX ADMIN — METOPROLOL SUCCINATE 50 MG: 50 TABLET, EXTENDED RELEASE ORAL at 09:02

## 2021-04-21 RX ADMIN — DOFETILIDE 500 MCG: 0.5 CAPSULE ORAL at 09:59

## 2021-04-21 RX ADMIN — ROSUVASTATIN CALCIUM 20 MG: 20 TABLET, FILM COATED ORAL at 22:34

## 2021-04-21 RX ADMIN — ASPIRIN 81 MG: 81 TABLET, COATED ORAL at 09:02

## 2021-04-21 RX ADMIN — TAMSULOSIN HYDROCHLORIDE 0.4 MG: 0.4 CAPSULE ORAL at 17:54

## 2021-04-21 RX ADMIN — POTASSIUM CHLORIDE 20 MEQ: 20 TABLET, EXTENDED RELEASE ORAL at 09:02

## 2021-04-21 RX ADMIN — FUROSEMIDE 80 MG: 80 TABLET ORAL at 09:02

## 2021-04-21 RX ADMIN — DOFETILIDE 500 MCG: 0.5 CAPSULE ORAL at 22:34

## 2021-04-21 RX ADMIN — CLOPIDOGREL BISULFATE 75 MG: 75 TABLET, FILM COATED ORAL at 09:02

## 2021-04-21 RX ADMIN — LISINOPRIL 2.5 MG: 2.5 TABLET ORAL at 09:01

## 2021-04-21 RX ADMIN — TAMSULOSIN HYDROCHLORIDE 0.4 MG: 0.4 CAPSULE ORAL at 09:02

## 2021-04-21 ASSESSMENT — PAIN SCALES - GENERAL
PAINLEVEL_OUTOF10: 0
PAINLEVEL_OUTOF10: 0

## 2021-04-21 NOTE — PROGRESS NOTES
drugs can cause significant increase in dofetilide plasma levels)  o Other medications which should not be used include prochlorperazine and megestrol   Serum potassium levels should be > 4 meQ/L prior to administration    Initiating Dofetilide (Tikosyn):    Obtain baseline EKG - QTc interval should be calculated    If baseline QTc is > 440 msec (500 msec in patients with ventricular conduction abnormalities), Tikosyn is contraindicated  o Note time, date and telemetry lead on strip  o All measurements of QTc interval should be from this lead   Measure the QT interval (QTc) 2-3 hours after each dose of Tikosyn until the patient is discharged     Dosing   Calculated creatinine clearance should be calculated using actual body weight  o If calculated CrCl > 60 mL/min, the appropriate dose of Tikosyn is 500 mcg PO BID  o 2-3 hours after initial dose, if QTc increases to > 15% from baseline, then Tikosyn should be decreased to 250 mcg BID   The second dose of Tikosyn should be given after the QT has been determined.  Only 1 down titration of Tikosyn for QTc is suggested.  If QTc is still excessively prolonged, Tikosyn should be discontinued   During therapy initiation in the hospital, at 2-3 hours after each dose of Tikosyn, determine the QTc to see if dose adjustment is necessary   Tikosyn should be given q12h at the same time every day    Prior to Patient Discharge --    Patient should receive a Tikosyn discharge bottle with 14 capsules (7 day supply)   Medication Guide should be reviewed with patient

## 2021-04-22 LAB
ANION GAP SERPL CALCULATED.3IONS-SCNC: 7 MMOL/L (ref 3–16)
BUN BLDV-MCNC: 12 MG/DL (ref 7–20)
CALCIUM SERPL-MCNC: 8.8 MG/DL (ref 8.3–10.6)
CHLORIDE BLD-SCNC: 105 MMOL/L (ref 99–110)
CO2: 25 MMOL/L (ref 21–32)
CREAT SERPL-MCNC: 0.9 MG/DL (ref 0.8–1.3)
EKG ATRIAL RATE: 44 BPM
EKG ATRIAL RATE: 45 BPM
EKG DIAGNOSIS: NORMAL
EKG DIAGNOSIS: NORMAL
EKG P AXIS: 50 DEGREES
EKG P AXIS: 66 DEGREES
EKG P-R INTERVAL: 214 MS
EKG P-R INTERVAL: 216 MS
EKG Q-T INTERVAL: 500 MS
EKG Q-T INTERVAL: 574 MS
EKG QRS DURATION: 102 MS
EKG QRS DURATION: 102 MS
EKG QTC CALCULATION (BAZETT): 432 MS
EKG QTC CALCULATION (BAZETT): 490 MS
EKG R AXIS: 74 DEGREES
EKG R AXIS: 76 DEGREES
EKG T AXIS: 66 DEGREES
EKG T AXIS: 75 DEGREES
EKG VENTRICULAR RATE: 44 BPM
EKG VENTRICULAR RATE: 45 BPM
GFR AFRICAN AMERICAN: >60
GFR NON-AFRICAN AMERICAN: >60
GLUCOSE BLD-MCNC: 105 MG/DL (ref 70–99)
MAGNESIUM: 2.2 MG/DL (ref 1.8–2.4)
POTASSIUM SERPL-SCNC: 4.1 MMOL/L (ref 3.5–5.1)
SODIUM BLD-SCNC: 137 MMOL/L (ref 136–145)

## 2021-04-22 PROCEDURE — 36415 COLL VENOUS BLD VENIPUNCTURE: CPT

## 2021-04-22 PROCEDURE — 94660 CPAP INITIATION&MGMT: CPT

## 2021-04-22 PROCEDURE — 93010 ELECTROCARDIOGRAM REPORT: CPT | Performed by: INTERNAL MEDICINE

## 2021-04-22 PROCEDURE — 2060000000 HC ICU INTERMEDIATE R&B

## 2021-04-22 PROCEDURE — 80048 BASIC METABOLIC PNL TOTAL CA: CPT

## 2021-04-22 PROCEDURE — 83735 ASSAY OF MAGNESIUM: CPT

## 2021-04-22 PROCEDURE — 93005 ELECTROCARDIOGRAM TRACING: CPT | Performed by: INTERNAL MEDICINE

## 2021-04-22 PROCEDURE — 93005 ELECTROCARDIOGRAM TRACING: CPT | Performed by: NURSE PRACTITIONER

## 2021-04-22 PROCEDURE — 99232 SBSQ HOSP IP/OBS MODERATE 35: CPT | Performed by: NURSE PRACTITIONER

## 2021-04-22 PROCEDURE — 6370000000 HC RX 637 (ALT 250 FOR IP): Performed by: NURSE PRACTITIONER

## 2021-04-22 RX ORDER — DOFETILIDE 0.5 MG/1
500 CAPSULE ORAL EVERY 12 HOURS SCHEDULED
Qty: 60 CAPSULE | Refills: 5 | Status: SHIPPED | OUTPATIENT
Start: 2021-04-22 | End: 2021-04-23

## 2021-04-22 RX ORDER — DOFETILIDE 0.5 MG/1
500 CAPSULE ORAL 2 TIMES DAILY
Qty: 14 CAPSULE | Refills: 0 | Status: SHIPPED | OUTPATIENT
Start: 2021-04-22 | End: 2021-04-23 | Stop reason: SDUPTHER

## 2021-04-22 RX ADMIN — POTASSIUM CHLORIDE 20 MEQ: 20 TABLET, EXTENDED RELEASE ORAL at 09:30

## 2021-04-22 RX ADMIN — TAMSULOSIN HYDROCHLORIDE 0.4 MG: 0.4 CAPSULE ORAL at 09:30

## 2021-04-22 RX ADMIN — DOFETILIDE 500 MCG: 0.5 CAPSULE ORAL at 21:06

## 2021-04-22 RX ADMIN — CLOPIDOGREL BISULFATE 75 MG: 75 TABLET, FILM COATED ORAL at 09:30

## 2021-04-22 RX ADMIN — TAMSULOSIN HYDROCHLORIDE 0.4 MG: 0.4 CAPSULE ORAL at 18:19

## 2021-04-22 RX ADMIN — ROSUVASTATIN CALCIUM 20 MG: 20 TABLET, FILM COATED ORAL at 21:06

## 2021-04-22 RX ADMIN — DOFETILIDE 500 MCG: 0.5 CAPSULE ORAL at 09:30

## 2021-04-22 RX ADMIN — FUROSEMIDE 80 MG: 80 TABLET ORAL at 09:30

## 2021-04-22 RX ADMIN — ASPIRIN 81 MG: 81 TABLET, COATED ORAL at 09:30

## 2021-04-22 ASSESSMENT — PAIN SCALES - GENERAL
PAINLEVEL_OUTOF10: 0

## 2021-04-22 NOTE — CARE COORDINATION
Case Management Assessment           Daily Note                 Date/ Time of Note: 4/22/2021 3:05 PM         Note completed by: Chava Shell    Patient Name: Angela Barone  YOB: 1955    Diagnosis:A-fib [facility]) [I48.91]  Persistent atrial fibrillation ([address] [I48.19]  Patient Admission Status: Inpatient    Date of Admission:4/20/2021  7:00 AM Length of Stay: 2 GLOS: GMLOS: 2.4    Current Plan of Care:  Plans for [facility] Friday  ________________________________________________________________________________________  PT AM-PAC:   / 24 per last evaluation on:       OT AM-PAC:   / 24 per last evaluation on:       DME Needs for discharge: n/a  ________________________________________________________________________________________  Discharge Plan: Home    Tentative discharge date: Friday    Current barriers to discharge:  Medical clearance    ________________________________________________________________________________________  Case Management Notes: Patient is from home with spouse, independent pta. No CM needs at this time. Jw Jason and his family were provided with choice of provider; he and his family are in agreement with the discharge plan.     Care Transition Patient: No    Chava ShellRADHA  [facility]  Case Management Department  Ph: [phone]  Fax: [phone]

## 2021-04-22 NOTE — PROGRESS NOTES
AF  - In SB - HR 40's - asymptomatic  - Off Eliquis for cath on Friday by Dr. Janell Sanders  - On dofetilide 500 mcg BID - QTc 398 - reviewed EKG with Dr. Angel Villarreal, continue the same dose  - Keep K+ > 4.0 and Mg > 2.0  - ECG ordered and results personally reviewed     CMP/chronic sCHF  - EF 45%  - NYHA class I/II  - QRS 98  - Possibly tachycardia induced  - BB on hold d/t SB  - Added low dose ACE  - BMP daily    NSVT/CAD  - 26 beats of VT on tele  - LHC on Friday after Eliquis wash out  - On ASA, plavix and statin    EF of 93%  ACEi for systolic HF  ASA and Statin for CAD  Anticoagulation for AF and heart failure  No Tobacco use. All questions and concerns were addressed to the patient/family. Alternatives to my treatment were discussed. The note was completed using EMR. Every effort was made to ensure accuracy; however, inadvertent computerized transcription errors may be present.      Buffy Wong 115

## 2021-04-22 NOTE — PROGRESS NOTES
Received call from [facility] pt beckie's into high 30's, assessed pt, easily woke up from sleep and VS taken and stable. \"Yup my heart does that, I'm okay\" Will continue to monitor.

## 2021-04-22 NOTE — PLAN OF CARE
Problem: Cardiac:  Goal: Ability to maintain an adequate cardiac output will improve  Description: Ability to maintain an adequate cardiac output will improve  Outcome: Met This Shift     Problem: Cardiac:  Goal: Hemodynamic stability will improve  Outcome: Met This Shift  Goal: Ability to maintain vital signs within normal range will improve  Outcome: Met This Shift  Note: VS remained stable this shift, bradys into the 40's when asleep yet remains asymptomatic and completely oriented and very easily arousable. Will continue to monitor. Outcome: Met This Shift    Problem: Cardiac:  Goal: Complications related to the disease process, condition or treatment will be avoided or minimized  Description: Complications related to the disease process, condition or treatment will be avoided or minimized  Outcome: Ongoing  Note: Patient independent with ADL's and has denied any discomfort at this shift.

## 2021-04-22 NOTE — PROGRESS NOTES
Clinical Pharmacy Progress Note    ADMIT DATE: 4/20/2021     Interval Update: Planning for [facility] today. LABORATORY:  Recent Labs     04/21/21  0420 04/22/21  0452    137   K 4.0 4.1    105   CO2 24 25   BUN 12 12   CREATININE 0.9 0.9   GLUCOSE 100* 105*       Estimated Creatinine Clearance: 109 mL/min (based on SCr of 0.9 mg/dL). Magnesium 2.2    ASSESSMENT/PLAN:  1)  Dofetilide Loading --   · Electrolytes--  · Pharmacy asked to assist with electrolyte replacement. Goal K > 4, Mg > 2.  · Electrolytes are slightly below goal.  · K = 4.1 - no additional replacement needed   · Mg = 2.2 - no additional replacement needed   · Pharmacy will continue to monitor and replace electrolytes as appropriate. · Dofetilide--  · Pharmacy asked to assist with dosing for Dofetilide. · See below for dosing guidelines. · Baseline QTc = 382. · Recommend Dofetilide 500 mcg as initial dose. · EKG to be obtained 2 hours after each dose of Dofetilide. Subsequent doses will be determined based on QTc interval.   · Pharmacy will continue to monitor and provide dosing recommendations as appropriate. Dofetilide Dosing Guidelines:  Before Initiating Dofetilide (Tikosyn):   Previous anti-arrhythmic therapy should be discontinued a minimum of 3 half-lives. Tikosyn should not be initiated following amiodarone therapy until amiodarone levels are < 0.3 mcg/mL or amiodarone withdrawn at least 3 months.    Patient must be admitted to telemetry unit and with a telemetry lead with a visible QT interval   Telemetry should be continued for minimum of 3 days or for 12 hours after conversion to normal sinus rhythm (whichever is longer)   The following medications are contraindicated in combination with dofetilide:  o Verapamil, hydrochlorothiazide, cimetidine, trimethoprim (including Bactrim), ketoconazole (as these drugs can cause significant increase in dofetilide plasma levels)  o Other medications which should not be used include prochlorperazine and megestrol   Serum potassium levels should be > 4 meQ/L prior to administration    Initiating Dofetilide (Tikosyn):    Obtain baseline EKG - QTc interval should be calculated    If baseline QTc is > 440 msec (500 msec in patients with ventricular conduction abnormalities), Tikosyn is contraindicated  o Note time, date and telemetry lead on strip  o All measurements of QTc interval should be from this lead   Measure the QT interval (QTc) 2-3 hours after each dose of Tikosyn until the patient is discharged     Dosing   Calculated creatinine clearance should be calculated using actual body weight  o If calculated CrCl > 60 mL/min, the appropriate dose of Tikosyn is 500 mcg PO BID  o 2-3 hours after initial dose, if QTc increases to > 15% from baseline, then Tikosyn should be decreased to 250 mcg BID   The second dose of Tikosyn should be given after the QT has been determined.  Only 1 down titration of Tikosyn for QTc is suggested. If QTc is still excessively prolonged, Tikosyn should be discontinued   During therapy initiation in the hospital, at 2-3 hours after each dose of Tikosyn, determine the QTc to see if dose adjustment is necessary   Tikosyn should be given q12h at the same time every day    Prior to Patient Discharge --    Patient should receive a Tikosyn discharge bottle with 14 capsules (7 day supply)   Medication Guide should be reviewed with patient    Please call Pharmacy with any questions. Thanks!   Lilly McdanielsD, [address] [place] [address] Pharmacy: [phone]  [address] [place] [address] wireless: [phone]  4/22/2021 7:39 AM

## 2021-04-23 ENCOUNTER — APPOINTMENT (OUTPATIENT)
Dept: CARDIAC CATH/INVASIVE PROCEDURES | Age: 66
DRG: 247 | End: 2021-04-23
Attending: INTERNAL MEDICINE
Payer: COMMERCIAL

## 2021-04-23 LAB
ANION GAP SERPL CALCULATED.3IONS-SCNC: 9 MMOL/L (ref 3–16)
BASOPHILS ABSOLUTE: 0 K/UL (ref 0–0.2)
BASOPHILS RELATIVE PERCENT: 0.8 %
BUN BLDV-MCNC: 12 MG/DL (ref 7–20)
CALCIUM SERPL-MCNC: 8.7 MG/DL (ref 8.3–10.6)
CHLORIDE BLD-SCNC: 106 MMOL/L (ref 99–110)
CHOLESTEROL, TOTAL: 125 MG/DL (ref 0–199)
CO2: 23 MMOL/L (ref 21–32)
CREAT SERPL-MCNC: 0.8 MG/DL (ref 0.8–1.3)
EKG ATRIAL RATE: 47 BPM
EKG DIAGNOSIS: NORMAL
EKG P AXIS: 26 DEGREES
EKG P-R INTERVAL: 216 MS
EKG Q-T INTERVAL: 524 MS
EKG QRS DURATION: 100 MS
EKG QTC CALCULATION (BAZETT): 463 MS
EKG R AXIS: 57 DEGREES
EKG T AXIS: 53 DEGREES
EKG VENTRICULAR RATE: 47 BPM
EOSINOPHILS ABSOLUTE: 0.2 K/UL (ref 0–0.6)
EOSINOPHILS RELATIVE PERCENT: 3.6 %
GFR AFRICAN AMERICAN: >60
GFR NON-AFRICAN AMERICAN: >60
GLUCOSE BLD-MCNC: 100 MG/DL (ref 70–99)
HCT VFR BLD CALC: 38.7 % (ref 40.5–52.5)
HDLC SERPL-MCNC: 45 MG/DL (ref 40–60)
HEMOGLOBIN: 12.9 G/DL (ref 13.5–17.5)
LDL CHOLESTEROL CALCULATED: 66 MG/DL
LYMPHOCYTES ABSOLUTE: 1.6 K/UL (ref 1–5.1)
LYMPHOCYTES RELATIVE PERCENT: 30.2 %
MAGNESIUM: 2.2 MG/DL (ref 1.8–2.4)
MCH RBC QN AUTO: 30.9 PG (ref 26–34)
MCHC RBC AUTO-ENTMCNC: 33.3 G/DL (ref 31–36)
MCV RBC AUTO: 92.9 FL (ref 80–100)
MONOCYTES ABSOLUTE: 0.5 K/UL (ref 0–1.3)
MONOCYTES RELATIVE PERCENT: 9.1 %
NEUTROPHILS ABSOLUTE: 2.9 K/UL (ref 1.7–7.7)
NEUTROPHILS RELATIVE PERCENT: 56.3 %
PDW BLD-RTO: 13.3 % (ref 12.4–15.4)
PLATELET # BLD: 143 K/UL (ref 135–450)
PMV BLD AUTO: 9.6 FL (ref 5–10.5)
POC ACT LR: 157 SEC
POC ACT LR: 180 SEC
POC ACT LR: 181 SEC
POC ACT LR: 212 SEC
POC ACT LR: 259 SEC
POC ACT LR: 283 SEC
POC ACT LR: >400 SEC
POTASSIUM SERPL-SCNC: 3.9 MMOL/L (ref 3.5–5.1)
RBC # BLD: 4.16 M/UL (ref 4.2–5.9)
SODIUM BLD-SCNC: 138 MMOL/L (ref 136–145)
TRIGL SERPL-MCNC: 68 MG/DL (ref 0–150)
VLDLC SERPL CALC-MCNC: 14 MG/DL
WBC # BLD: 5.2 K/UL (ref 4–11)

## 2021-04-23 PROCEDURE — 6360000004 HC RX CONTRAST MEDICATION: Performed by: INTERNAL MEDICINE

## 2021-04-23 PROCEDURE — 93458 L HRT ARTERY/VENTRICLE ANGIO: CPT | Performed by: INTERNAL MEDICINE

## 2021-04-23 PROCEDURE — 93458 L HRT ARTERY/VENTRICLE ANGIO: CPT

## 2021-04-23 PROCEDURE — 2709999900 HC NON-CHARGEABLE SUPPLY

## 2021-04-23 PROCEDURE — 80061 LIPID PANEL: CPT

## 2021-04-23 PROCEDURE — 6370000000 HC RX 637 (ALT 250 FOR IP): Performed by: NURSE PRACTITIONER

## 2021-04-23 PROCEDURE — 4A023N7 MEASUREMENT OF CARDIAC SAMPLING AND PRESSURE, LEFT HEART, PERCUTANEOUS APPROACH: ICD-10-PCS | Performed by: INTERNAL MEDICINE

## 2021-04-23 PROCEDURE — 6360000002 HC RX W HCPCS

## 2021-04-23 PROCEDURE — 83735 ASSAY OF MAGNESIUM: CPT

## 2021-04-23 PROCEDURE — 2580000003 HC RX 258: Performed by: NURSE PRACTITIONER

## 2021-04-23 PROCEDURE — C1887 CATHETER, GUIDING: HCPCS

## 2021-04-23 PROCEDURE — C1894 INTRO/SHEATH, NON-LASER: HCPCS

## 2021-04-23 PROCEDURE — 36415 COLL VENOUS BLD VENIPUNCTURE: CPT

## 2021-04-23 PROCEDURE — C1874 STENT, COATED/COV W/DEL SYS: HCPCS

## 2021-04-23 PROCEDURE — C9600 PERC DRUG-EL COR STENT SING: HCPCS

## 2021-04-23 PROCEDURE — 85025 COMPLETE CBC W/AUTO DIFF WBC: CPT

## 2021-04-23 PROCEDURE — 85347 COAGULATION TIME ACTIVATED: CPT

## 2021-04-23 PROCEDURE — 93010 ELECTROCARDIOGRAM REPORT: CPT | Performed by: INTERNAL MEDICINE

## 2021-04-23 PROCEDURE — 99152 MOD SED SAME PHYS/QHP 5/>YRS: CPT

## 2021-04-23 PROCEDURE — 2500000003 HC RX 250 WO HCPCS

## 2021-04-23 PROCEDURE — 94660 CPAP INITIATION&MGMT: CPT

## 2021-04-23 PROCEDURE — C1769 GUIDE WIRE: HCPCS

## 2021-04-23 PROCEDURE — 99232 SBSQ HOSP IP/OBS MODERATE 35: CPT | Performed by: NURSE PRACTITIONER

## 2021-04-23 PROCEDURE — 2060000000 HC ICU INTERMEDIATE R&B

## 2021-04-23 PROCEDURE — 92928 PRQ TCAT PLMT NTRAC ST 1 LES: CPT | Performed by: INTERNAL MEDICINE

## 2021-04-23 PROCEDURE — 93005 ELECTROCARDIOGRAM TRACING: CPT | Performed by: NURSE PRACTITIONER

## 2021-04-23 PROCEDURE — 027035Z DILATION OF CORONARY ARTERY, ONE ARTERY WITH TWO DRUG-ELUTING INTRALUMINAL DEVICES, PERCUTANEOUS APPROACH: ICD-10-PCS | Performed by: INTERNAL MEDICINE

## 2021-04-23 PROCEDURE — 80048 BASIC METABOLIC PNL TOTAL CA: CPT

## 2021-04-23 PROCEDURE — 6370000000 HC RX 637 (ALT 250 FOR IP)

## 2021-04-23 PROCEDURE — 99153 MOD SED SAME PHYS/QHP EA: CPT

## 2021-04-23 RX ORDER — DOFETILIDE 0.5 MG/1
500 CAPSULE ORAL EVERY 12 HOURS SCHEDULED
Qty: 180 CAPSULE | Refills: 3 | Status: SHIPPED | OUTPATIENT
Start: 2021-04-23 | End: 2021-04-29 | Stop reason: SDUPTHER

## 2021-04-23 RX ORDER — POTASSIUM CHLORIDE 750 MG/1
10 TABLET, EXTENDED RELEASE ORAL ONCE
Status: COMPLETED | OUTPATIENT
Start: 2021-04-23 | End: 2021-04-23

## 2021-04-23 RX ORDER — DOFETILIDE 0.5 MG/1
500 CAPSULE ORAL EVERY 12 HOURS SCHEDULED
Qty: 180 CAPSULE | Refills: 3 | Status: SHIPPED | OUTPATIENT
Start: 2021-04-23 | End: 2021-04-23

## 2021-04-23 RX ORDER — FUROSEMIDE 80 MG
80 TABLET ORAL DAILY
Qty: 90 TABLET | Refills: 3 | Status: SHIPPED | OUTPATIENT
Start: 2021-04-23 | End: 2021-05-03 | Stop reason: SDUPTHER

## 2021-04-23 RX ORDER — DOFETILIDE 0.5 MG/1
500 CAPSULE ORAL 2 TIMES DAILY
Qty: 14 CAPSULE | Refills: 0 | Status: SHIPPED | OUTPATIENT
Start: 2021-04-23 | End: 2021-04-29 | Stop reason: SDUPTHER

## 2021-04-23 RX ORDER — SODIUM CHLORIDE 0.9 % (FLUSH) 0.9 %
5-40 SYRINGE (ML) INJECTION EVERY 12 HOURS SCHEDULED
Status: DISCONTINUED | OUTPATIENT
Start: 2021-04-23 | End: 2021-04-24 | Stop reason: HOSPADM

## 2021-04-23 RX ORDER — SODIUM CHLORIDE 9 MG/ML
25 INJECTION, SOLUTION INTRAVENOUS PRN
Status: DISCONTINUED | OUTPATIENT
Start: 2021-04-23 | End: 2021-04-24 | Stop reason: HOSPADM

## 2021-04-23 RX ORDER — SODIUM CHLORIDE 0.9 % (FLUSH) 0.9 %
5-40 SYRINGE (ML) INJECTION PRN
Status: DISCONTINUED | OUTPATIENT
Start: 2021-04-23 | End: 2021-04-24 | Stop reason: HOSPADM

## 2021-04-23 RX ORDER — LISINOPRIL 2.5 MG/1
2.5 TABLET ORAL DAILY
Qty: 30 TABLET | Refills: 0 | Status: SHIPPED | OUTPATIENT
Start: 2021-04-23 | End: 2021-05-20 | Stop reason: SDUPTHER

## 2021-04-23 RX ORDER — ACETAMINOPHEN 325 MG/1
650 TABLET ORAL EVERY 4 HOURS PRN
Status: DISCONTINUED | OUTPATIENT
Start: 2021-04-23 | End: 2021-04-24 | Stop reason: HOSPADM

## 2021-04-23 RX ORDER — SODIUM CHLORIDE 9 MG/ML
INJECTION, SOLUTION INTRAVENOUS CONTINUOUS
Status: ACTIVE | OUTPATIENT
Start: 2021-04-23 | End: 2021-04-23

## 2021-04-23 RX ORDER — ATROPINE SULFATE 0.4 MG/ML
0.5 AMPUL (ML) INJECTION
Status: ACTIVE | OUTPATIENT
Start: 2021-04-23 | End: 2021-04-23

## 2021-04-23 RX ADMIN — POTASSIUM CHLORIDE 10 MEQ: 10 TABLET, EXTENDED RELEASE ORAL at 12:32

## 2021-04-23 RX ADMIN — FUROSEMIDE 80 MG: 80 TABLET ORAL at 12:30

## 2021-04-23 RX ADMIN — DOFETILIDE 500 MCG: 0.5 CAPSULE ORAL at 12:37

## 2021-04-23 RX ADMIN — ROSUVASTATIN CALCIUM 20 MG: 20 TABLET, FILM COATED ORAL at 21:01

## 2021-04-23 RX ADMIN — Medication 10 ML: at 21:01

## 2021-04-23 RX ADMIN — TAMSULOSIN HYDROCHLORIDE 0.4 MG: 0.4 CAPSULE ORAL at 17:37

## 2021-04-23 RX ADMIN — TAMSULOSIN HYDROCHLORIDE 0.4 MG: 0.4 CAPSULE ORAL at 12:31

## 2021-04-23 RX ADMIN — LISINOPRIL 2.5 MG: 2.5 TABLET ORAL at 12:30

## 2021-04-23 RX ADMIN — ASPIRIN 81 MG: 81 TABLET, COATED ORAL at 12:30

## 2021-04-23 RX ADMIN — IOHEXOL 250 ML: 350 INJECTION, SOLUTION INTRAVENOUS at 11:12

## 2021-04-23 RX ADMIN — DOFETILIDE 500 MCG: 0.5 CAPSULE ORAL at 21:01

## 2021-04-23 RX ADMIN — POTASSIUM CHLORIDE 20 MEQ: 20 TABLET, EXTENDED RELEASE ORAL at 12:30

## 2021-04-23 RX ADMIN — SODIUM CHLORIDE: 9 INJECTION, SOLUTION INTRAVENOUS at 12:31

## 2021-04-23 ASSESSMENT — PAIN SCALES - GENERAL
PAINLEVEL_OUTOF10: 0

## 2021-04-23 NOTE — CARE COORDINATION
Case Management Assessment           Daily Note                 Date/ Time of Note: 4/23/2021 12:41 PM         Note completed by: Ulises Gusman    Patient Name: Maxine Staton  YOB: 1955    Diagnosis:A-fib [facility]) [I48.91]  Persistent atrial fibrillation ([facility] [address] [I48.19]  Patient Admission Status: Inpatient    Date of Admission:4/20/2021  7:00 AM Length of Stay: 3 GLOS: GMLOS: 2.4    Current Plan of Care: C, d/c on Tikosyn to be sent meds to beds today as they are closed on weekends  ________________________________________________________________________________________  PT AM-PAC:   / 24 per last evaluation on:     OT AM-PAC:   / 24 per last evaluation on:     DME Needs for discharge:   ________________________________________________________________________________________  Discharge Plan: Home    Tentative discharge date: 4.24    Current barriers to discharge: medical clearance      ________________________________________________________________________________________  Case Management Notes:  Patient is from home with spouse, independent pta. No CM needs at this time. Spouse to transport. Shawn Cherry and his family were provided with choice of provider; he and his family are in agreement with the discharge plan.     Care Transition Patient: Payton Gusman RN  [facility]  Case Management Department  Ph: [phone]  Fax: [phone]

## 2021-04-23 NOTE — PLAN OF CARE
Problem: Activity:  Goal: Ability to tolerate increased activity will improve  Description: Ability to tolerate increased activity will improve  Note: Assess for SOB/WOLFE with activities. Problem: Cardiac:  Goal: Ability to maintain an adequate cardiac output will improve  Description: Ability to maintain an adequate cardiac output will improve  Note: Assess for signs of decreased cardiac output. Monitor for edema.

## 2021-04-23 NOTE — PROCEDURES
[address] [facility]               [address]                            CARDIAC CATHETERIZATION    PATIENT NAME: Anthony Harrell                      :        1955  MED REC NO:   [id number]                          ROOM:       1368  ACCOUNT NO:   [de-identified]                           ADMIT DATE: 2021  PROVIDER:     Tatiana Horne. Angelica Cr MD    DATE OF PROCEDURE:  2021    INDICATION FOR PROCEDURE:  The patient with inferolateral scar on  nuclear scan. He presents for Tikosyn load for atrial fibrillation. On  the monitor in the hospital, he was having monomorphic ventricular  tachycardia. He has been maintained on Eliquis as an outpatient and  initially in the hospital and so the Eliquis was held for two days and  he presents today for risk stratification with coronary angiography as  he has known coronary disease and there is a concern that he has  ischemic-appearing ventricular tachycardia on the monitor. He has had  some shortness of breath and obviously atrial fibrillation. DESCRIPTION OF PROCEDURE:  Prepped and draped in sterile manner, locally  anesthetized with 1% lidocaine in the right femoral region, 5-Persian  sheath was placed in a retrograde manner in the right femoral artery  over a guidewire. JL4 and JR4 catheters were used during the diagnostic  procedure. All were aspirated and flushed prior to use. All catheters  were advanced under fluoroscopic guidance over a guidewire and retracted  over guidewire. FINDINGS:  Left main coronary is normal.  The proximal left anterior  descending coronary artery is normal.  The first diagonal branch is a  small caliber vessel with a 60% stenosis at the ostium. There was ERICH  grade 3 flow in the small diagonal branch. There was a stent at mid  vessel that has 20% in-stent stenosis. There is ERICH grade 3 flow in  the LAD.   The circumflex coronary artery is normal.  Obtuse marginal  branches 1, 2, and 3 are normal.    JR4 catheter engaged the high anterior takeoff of the right coronary  artery and this shows that the proximal and mid right coronary arteries  are normal.  The distal right coronary artery has mild irregularities. The right posterolateral branches are normal.  The right posterior  descending coronary artery has a tubular 80% stenosis that is hazy and  it is a long lesion. Beyond that, there is a focal 70% stenosis. He has elected to proceed with a PCI. I used a 5-Arabic MAC guide. A BMW guidewire was advanced beyond the right posterior descending  lesions. A 2.75 x 26 mm length Resolute Raj drug-eluting stent was  placed across the 80% tubular stenosis and inflated to 18 atmospheres  x30 seconds. There was ERICH-3 blood flow with 0% residual stenosis. The 70% stenosis beyond that was just at the takeoff of a branch of the  right posterior descending and it was still very narrow and it was not  covered with the first stent, so a 2.5 x 8 mm Resolute Raj drug-eluting  stent was placed across the lesion and inflated to 18 atmospheres x30  seconds. There was ERICH-3 blood flow with 0% residual stenosis. The  patient tolerated the procedure well and the angioplasty hardware was  removed. I did inject nitroglycerin 400 mcg into the right coronary  artery. Please note that before the intervention, I placed the JR4 catheter into  the left ventricle. The LVEDP was 15 mmHg. Hand injection XAVIER  projection shows an ejection fraction of 42% with mild inferior wall  hypokinesia. There was no gradient upon pullback from the left  ventricle to the ascending aorta. No significant mitral regurgitation  was seen. The Mallampati score was assessed before the procedure and it was 2 and  the ASA score was II as well. The start time of the procedure was 09:58  a.m. and the end time was 10:57 a.m.   I used 1.5 mg of Versed IV push in  divided doses and fentanyl 150 mcg in divided dose by IV push. The  total contrast used was 175 mL. Injection of the femoral sheath showed  the sheath was located near many branches of the common right femoral  artery, so manual pressure will be applied. The ACT at the closure of  procedure was 283 seconds. Estimated blood loss was less than 20 mL. The patient tolerated the procedure well. No complications were noted  at all. The patient was comfortable upon return to his room with the  sheath sutured into place. Hydration will ensue. Risk factor  modification will ensue. CONCLUSION:  Successful stenting x2 to right posterior descending  coronary artery with a 2.5 mm x 8 mm Raj and a 2.75 mm x 26 mm length  Raj. ERICH-3 blood flow was present post procedure in the right  posterior descending coronary artery. There was a 50% ostial small  diagonal branch stenosis, 20% in-stent stenosis of the LAD, circumflex  normal, left main normal.  LVEF 42% with inferior wall hypokinesia.         Becky Patricia MD    D: 04/23/2021 11:19:48       T: 04/23/2021 12:41:09     [email or web address]  Job#: [id number]     Doc#: [id number]    CC:

## 2021-04-23 NOTE — PROGRESS NOTES
CC: A fib, Tikosyn loading  HPI:   77 y.o. with AF who was admitted for dofetilide loading. Initiated on 500 mcg Tikosyn and converted to NSR after first dose. Noted to have run of 26 beats NSVT on tele. Recent MPI showed no reversible ischemia with an EF 37%. [facility] planned for today Eliquis has been held. S: Denies cp, sob, or palpitations. Awaiting angiogram this morning    Tele: Sinus beckie    O:  Physical Exam:  BP (!) 138/47   Pulse 50   Temp 97.2 °F (36.2 °C) (Oral)   Resp 15   Ht 6' 1\" (1.854 m)   Wt 271 lb (122.9 kg)   SpO2 98%   BMI 35.75 kg/m²    General (appearance):  No acute distress  Eyes: anicteric   Neck: soft, No JVD  Ears/Nose/Mouth/Thorat: No cyanosis  CV: Reg, beckie   Respiratory:  Clear, normal   GI: soft, non-tender, non-distended  Skin: Warm, dry. No rashes  Neuro/Psych: Alert and oriented x 3. Appropriate behavior  Ext:  No c/c. No edema  Pulses:  2+ radial     I.O's= -1.3     Weight  Admission: Weight: 271 lb (122.9 kg)   Today: Weight: 271 lb (122.9 kg)    CBC: No results for input(s): WBC, HGB, HCT, MCV, PLT in the last 72 hours. BMP:   Recent Labs     04/21/21  0420 04/22/21  0452 04/23/21  0437    137 138   K 4.0 4.1 3.9    105 106   CO2 24 25 23   BUN 12 12 12   CREATININE 0.9 0.9 0.8     Mag:   Lab Results   Component Value Date    MG 2.20 04/23/2021     PT/INR:   Recent Labs     04/21/21  0420   PROTIME 13.0   INR 1.12     Pro-BNP:   Lab Results   Component Value Date    PROBNP 467 02/09/2021         Imaging:    3/23/21 Nuc stress:     Faith Steiner is a small fixed perfusion defect at the inferoapical wall of the Left    ventricle consistent with infarction. There is no perfusion defect to    suggest ischemia.    The LVEF is reduced at 37% with global mild hypokinesis and moderate    hypokinesis at the inferoapical wall.    The TID is normal at 0.99.     3/16/2021 YVROSE:   There is mildly increased left ventricular wall thickness.    Overall left ventricular systolic function appears mildly reduced with   estimated LVEF of 45%. Mitral valve prolapse is present. Mild to moderate mitral regurgitation. The left atrial size appears dilated. There is no evidence of mass or   thrombus in the left atrium or appendage however there is spontaneous   contrast seen. No intracardiac thrombus was seen on this study. Assessment:  77 y.o. with AF who was admitted for Tikosyn loading. Had 26 beats of NSVT.  [facility] planned for today  Issues:  -A fib  -CAD (PCI 2006)  -HTN  -HLD  -NATHAN/CPAP    Plan:  -Keep K>4, Mg>2.  -ASA, plavix  -Dofetilide 500 mcg po bid  -Lasix 80 mg po daily  -Lisinopril 2.5 mg   -Crestor     [facility] today

## 2021-04-23 NOTE — PLAN OF CARE
Brief Pre-Op Note/Sedation Assessment      Meseret Davila  1955  [id number]      [id number]  11:28 AM    Planned Procedure: Cardiac Catheterization Procedure    Post Procedure Plan: Return to same level of care    Consent: I have discussed with the patient and/or the patient representative the indication, alternatives, and the possible risks and/or complications of the planned procedure and the anesthesia methods. The patient and/or patient representative appear to understand and agree to proceed.     Chief Complaint: Fatigue      Indications for Cath Procedure:  Cardiac Arrythmia and LV Dysfunction  Anginal Classification within 2 weeks:  No symptoms  NYHA Heart Failure Class within 2 weeks: No symptoms  Is Cath Lab Visit Valve-related?: No  Surgical Risk: Intermediate  Functional Type: >= 4 METS without symptoms    Anti- Anginal Meds within 2 weeks:   Yes: Beta Blockers    Stress or Imaging Studies Performed (within 6 months):  None     Vital Signs:  BP (!) 138/47   Pulse 50   Temp 97.2 °F (36.2 °C) (Oral)   Resp 15   Ht 6' 1\" (1.854 m)   Wt 271 lb (122.9 kg)   SpO2 98%   BMI 35.75 kg/m²     Allergies:  No Known Allergies    Past Medical History:  Past Medical History:   Diagnosis Date    CAD (coronary artery disease)     Hyperlipidemia     Hypertension     S/P colonoscopy sept 2010    BN    Shoulder injury     LEFT & RIGHT /Dr Kenneth Sandra (football)    Torn meniscus     LT /Dr Kenneth Sandra (football)         Surgical History:  Past Surgical History:   Procedure Laterality Date    BACK SURGERY Bilateral 2014    SCIATIC NERVE     COLONOSCOPY N/A 9/3/2019    COLONOSCOPY WITH BIOPSY performed by Gage Burns MD at [address] [place] [address]  2006    x2    ENTEROSCOPY N/A 10/1/2019    PUSH ENTEROSCOPY BIOPSY SMALL BOWEL performed by Gage Burns MD at [address] Left 07/2019    RING FINGER - TRIGGER    HAND SURGERY  2005,2010    thumb ,nando lisinopril (PRINIVIL;ZESTRIL) tablet 2.5 mg  2.5 mg Oral Daily Sachin KelleyCORI - CNP   Stopped at 04/22/21 1103           Pre-Sedation:    Pre-Sedation Documentation and Exam:  I have personally completed a history, physical exam & review of systems for this patient (see notes). Prior History of Anesthesia Complications:   none    Modified Mallampati:  II (soft palate, uvula, fauces visible)    ASA Classification:  Class 2 - A normal healthy patient with mild systemic disease      Yessica Scale: Activity:  2 - Able to move 4 extremities voluntarily on command  Respiration:  2 - Able to breathe deeply and cough freely  Circulation:  1 - BP+/- 20-50mmHg of normal  Consciousness:  2 - Fully awake  Oxygen Saturation (color):  2 - Able to maintain oxygen saturation >92% on room air    Sedation/Anesthesia Plan:  Guard the patient's safety and welfare. Minimize physical discomfort and pain. Minimize negative psychological responses to treatment by providing sedation and analgesia and maximize the potential amnesia. Patient to meet pre-procedure discharge plan.     Medication Planned:  midazolam intravenously and fentanyl intravenously    Patient is an appropriate candidate for plan of sedation: yes      Electronically signed by Raúl Bob MD on 4/23/2021 at 11:28 AM

## 2021-04-23 NOTE — PROGRESS NOTES
Sheath pulled @1526. Pressure held for 18 minutes. Pt tolerated well. Aware of bedrest for 6 hours. VSS.

## 2021-04-23 NOTE — PROGRESS NOTES
Clinical Pharmacy Progress Note    ADMIT DATE: 4/20/2021     Interval Update: Planning for [facility] today. LABORATORY:  Recent Labs     04/22/21  0452 04/23/21  0437    138   K 4.1 3.9    106   CO2 25 23   BUN 12 12   CREATININE 0.9 0.8   GLUCOSE 105* 100*       Estimated Creatinine Clearance: 123 mL/min (based on SCr of 0.8 mg/dL). Magnesium 2.2    ASSESSMENT/PLAN:  1)  Dofetilide Loading --   · Electrolytes--  · Pharmacy asked to assist with electrolyte replacement. Goal K > 4, Mg > 2.  · Electrolytes are as follows:   · K = 3.9 - additional 10 meq PO KCl ordered  (30 meq total)   · Mg = 2.2 - no additional replacement needed   · Pharmacy will continue to monitor and replace electrolytes as appropriate. · Dofetilide--  · Pharmacy asked to assist with dosing for Dofetilide. · See below for dosing guidelines. · Baseline QTc = 382. · Recommend Dofetilide 500 mcg as initial dose. · EKG to be obtained 2 hours after each dose of Dofetilide. Subsequent doses will be determined based on QTc interval.   · Pharmacy will continue to monitor and provide dosing recommendations as appropriate. Dofetilide Dosing Guidelines:  Before Initiating Dofetilide (Tikosyn):   Previous anti-arrhythmic therapy should be discontinued a minimum of 3 half-lives. Tikosyn should not be initiated following amiodarone therapy until amiodarone levels are < 0.3 mcg/mL or amiodarone withdrawn at least 3 months.    Patient must be admitted to telemetry unit and with a telemetry lead with a visible QT interval   Telemetry should be continued for minimum of 3 days or for 12 hours after conversion to normal sinus rhythm (whichever is longer)   The following medications are contraindicated in combination with dofetilide:  o Verapamil, hydrochlorothiazide, cimetidine, trimethoprim (including Bactrim), ketoconazole (as these drugs can cause significant increase in dofetilide plasma levels)  o Other medications which should not be used include prochlorperazine and megestrol   Serum potassium levels should be > 4 meQ/L prior to administration    Initiating Dofetilide (Tikosyn):    Obtain baseline EKG - QTc interval should be calculated    If baseline QTc is > 440 msec (500 msec in patients with ventricular conduction abnormalities), Tikosyn is contraindicated  o Note time, date and telemetry lead on strip  o All measurements of QTc interval should be from this lead   Measure the QT interval (QTc) 2-3 hours after each dose of Tikosyn until the patient is discharged     Dosing   Calculated creatinine clearance should be calculated using actual body weight  o If calculated CrCl > 60 mL/min, the appropriate dose of Tikosyn is 500 mcg PO BID  o 2-3 hours after initial dose, if QTc increases to > 15% from baseline, then Tikosyn should be decreased to 250 mcg BID   The second dose of Tikosyn should be given after the QT has been determined.  Only 1 down titration of Tikosyn for QTc is suggested. If QTc is still excessively prolonged, Tikosyn should be discontinued   During therapy initiation in the hospital, at 2-3 hours after each dose of Tikosyn, determine the QTc to see if dose adjustment is necessary   Tikosyn should be given q12h at the same time every day    Prior to Patient Discharge --    Patient should receive a Tikosyn discharge bottle with 14 capsules (7 day supply)   Medication Guide should be reviewed with patient    Please call Pharmacy with any questions. Thanks!   Lourdes Lacey, PharmD, [address] [place] [address] Pharmacy: [phone]  [address] [place] [address] wireless: [phone]  4/23/2021 7:10 AM

## 2021-04-23 NOTE — PLAN OF CARE
Pt had coronary angiogram. Right PDA was stented with 2 RADHA. Pt on asa and plavix  Will monitor overnight and discharge home tomorrow.     Restart eliquis in 48 hours  RX sent to [facility] to beds for Hugh Valero NP

## 2021-04-24 VITALS
HEART RATE: 51 BPM | OXYGEN SATURATION: 96 % | DIASTOLIC BLOOD PRESSURE: 69 MMHG | BODY MASS INDEX: 35.92 KG/M2 | TEMPERATURE: 98.1 F | RESPIRATION RATE: 18 BRPM | WEIGHT: 271 LBS | HEIGHT: 73 IN | SYSTOLIC BLOOD PRESSURE: 127 MMHG

## 2021-04-24 LAB
ANION GAP SERPL CALCULATED.3IONS-SCNC: 11 MMOL/L (ref 3–16)
BUN BLDV-MCNC: 10 MG/DL (ref 7–20)
CALCIUM SERPL-MCNC: 8.8 MG/DL (ref 8.3–10.6)
CHLORIDE BLD-SCNC: 105 MMOL/L (ref 99–110)
CO2: 23 MMOL/L (ref 21–32)
CREAT SERPL-MCNC: 0.8 MG/DL (ref 0.8–1.3)
EKG ATRIAL RATE: 48 BPM
EKG ATRIAL RATE: 52 BPM
EKG ATRIAL RATE: 61 BPM
EKG DIAGNOSIS: NORMAL
EKG P AXIS: 38 DEGREES
EKG P AXIS: 60 DEGREES
EKG P AXIS: 61 DEGREES
EKG P-R INTERVAL: 202 MS
EKG P-R INTERVAL: 222 MS
EKG Q-T INTERVAL: 476 MS
EKG Q-T INTERVAL: 506 MS
EKG Q-T INTERVAL: 510 MS
EKG QRS DURATION: 100 MS
EKG QRS DURATION: 102 MS
EKG QRS DURATION: 104 MS
EKG QTC CALCULATION (BAZETT): 425 MS
EKG QTC CALCULATION (BAZETT): 474 MS
EKG QTC CALCULATION (BAZETT): 509 MS
EKG R AXIS: 47 DEGREES
EKG R AXIS: 73 DEGREES
EKG R AXIS: 78 DEGREES
EKG T AXIS: 52 DEGREES
EKG T AXIS: 72 DEGREES
EKG T AXIS: 72 DEGREES
EKG VENTRICULAR RATE: 48 BPM
EKG VENTRICULAR RATE: 52 BPM
EKG VENTRICULAR RATE: 61 BPM
GFR AFRICAN AMERICAN: >60
GFR NON-AFRICAN AMERICAN: >60
GLUCOSE BLD-MCNC: 94 MG/DL (ref 70–99)
HCT VFR BLD CALC: 39.8 % (ref 40.5–52.5)
HEMOGLOBIN: 13.6 G/DL (ref 13.5–17.5)
MAGNESIUM: 1.9 MG/DL (ref 1.8–2.4)
MCH RBC QN AUTO: 31.3 PG (ref 26–34)
MCHC RBC AUTO-ENTMCNC: 34.3 G/DL (ref 31–36)
MCV RBC AUTO: 91.5 FL (ref 80–100)
PDW BLD-RTO: 12.9 % (ref 12.4–15.4)
PLATELET # BLD: 156 K/UL (ref 135–450)
PMV BLD AUTO: 9.5 FL (ref 5–10.5)
POTASSIUM REFLEX MAGNESIUM: 4.3 MMOL/L (ref 3.5–5.1)
POTASSIUM SERPL-SCNC: 4.3 MMOL/L (ref 3.5–5.1)
RBC # BLD: 4.35 M/UL (ref 4.2–5.9)
SODIUM BLD-SCNC: 139 MMOL/L (ref 136–145)
WBC # BLD: 6.1 K/UL (ref 4–11)

## 2021-04-24 PROCEDURE — 80048 BASIC METABOLIC PNL TOTAL CA: CPT

## 2021-04-24 PROCEDURE — 2580000003 HC RX 258: Performed by: NURSE PRACTITIONER

## 2021-04-24 PROCEDURE — 83735 ASSAY OF MAGNESIUM: CPT

## 2021-04-24 PROCEDURE — 6370000000 HC RX 637 (ALT 250 FOR IP): Performed by: NURSE PRACTITIONER

## 2021-04-24 PROCEDURE — 85027 COMPLETE CBC AUTOMATED: CPT

## 2021-04-24 PROCEDURE — 99238 HOSP IP/OBS DSCHRG MGMT 30/<: CPT | Performed by: INTERNAL MEDICINE

## 2021-04-24 PROCEDURE — 93010 ELECTROCARDIOGRAM REPORT: CPT | Performed by: INTERNAL MEDICINE

## 2021-04-24 PROCEDURE — 36415 COLL VENOUS BLD VENIPUNCTURE: CPT

## 2021-04-24 PROCEDURE — 93005 ELECTROCARDIOGRAM TRACING: CPT | Performed by: INTERNAL MEDICINE

## 2021-04-24 RX ADMIN — FUROSEMIDE 80 MG: 80 TABLET ORAL at 08:43

## 2021-04-24 RX ADMIN — TAMSULOSIN HYDROCHLORIDE 0.4 MG: 0.4 CAPSULE ORAL at 08:43

## 2021-04-24 RX ADMIN — CLOPIDOGREL BISULFATE 75 MG: 75 TABLET, FILM COATED ORAL at 08:43

## 2021-04-24 RX ADMIN — DOFETILIDE 500 MCG: 0.5 CAPSULE ORAL at 08:43

## 2021-04-24 RX ADMIN — ASPIRIN 81 MG: 81 TABLET, COATED ORAL at 08:43

## 2021-04-24 RX ADMIN — POTASSIUM CHLORIDE 20 MEQ: 20 TABLET, EXTENDED RELEASE ORAL at 08:43

## 2021-04-24 RX ADMIN — LISINOPRIL 2.5 MG: 2.5 TABLET ORAL at 08:43

## 2021-04-24 RX ADMIN — Medication 10 ML: at 08:44

## 2021-04-24 ASSESSMENT — PAIN SCALES - GENERAL
PAINLEVEL_OUTOF10: 0

## 2021-04-24 NOTE — PROGRESS NOTES
Discharge - pt discharged per order. IV and tele discontinued. Pt and wife aware of post cath instructions and future follow ups. Understands new medications and when to take them. Pt wheeled off unit by PCA.

## 2021-04-24 NOTE — CARE COORDINATION
Case Management Assessment            Discharge Note                    Date / Time of Note: 4/24/2021 10:11 AM                  Discharge Note Completed by: Juana Melchor     Discharge order noted and patient will d/c to home with his spouse. Denies needs from CM. Patient Name: Philis Ormond   YOB: 1955  Diagnosis: A-fib [facility]) [I48.91]  Persistent atrial fibrillation ([facility] [address] [I48.19]   Date / Time: 4/20/2021  7:00 AM    Current PCP: Marc Stevens MD  Clinic patient: No    Hospitalization in the last 30 days: No    Advance Directives:  Code Status: Full Code  [place] DNR form completed and on chart: Not Indicated    Financial:  Payor: UMR / Plan: UMR  / Product Type: *No Product type* /      Pharmacy:    [facility]  [address] [facility] [phone] - f [phone]  [facility]  [place] [address]  Phone: [phone] Fax: [phone]    [id number], [address] [place] [address] [phone] - f [phone]  Aramis Zambrano [facility] [address] [place] [address]  Phone: [phone] Fax: [phone]      Assistance purchasing medications?: Potential Assistance Purchasing Medications: No  Assistance provided by Case Management: None at this time    Does patient want to participate in local refill/ meds to beds program?: No    Meds To Beds General Rules:  1. Can ONLY be done Monday- Friday between 8:30am-5pm  2. Prescription(s) must be in pharmacy by 3pm to be filled same day  3. Copy of patient's insurance/ prescription drug card and patient face sheet must be sent along with the prescription(s)  4. Cost of Rx cannot be added to hospital bill. If financial assistance is needed, please contact unit  or ;  or  CANNOT provide pharmacy voucher for patients co-pays  5.  Patients can then  the prescription on their way out of the hospital at discharge, or pharmacy can deliver to the bedside if staff

## 2021-04-24 NOTE — DISCHARGE SUMMARY
Date    CREATININE 0.8 04/24/2021    BUN 10 04/24/2021     04/24/2021    K 4.3 04/24/2021     04/24/2021    CO2 23 04/24/2021      Lab Results   Component Value Date    WBC 6.1 04/24/2021    HGB 13.6 04/24/2021    HCT 39.8 (L) 04/24/2021    MCV 91.5 04/24/2021     04/24/2021      Lab Results   Component Value Date    INR 1.12 04/21/2021    PROTIME 13.0 04/21/2021    No results found for: BNP      Disposition: home    Patient Instructions:    Brandy Scott   Home Medication Instructions IAQ:[id number]    Printed on:04/24/21 0948   Medication Information                      apixaban (ELIQUIS) 5 MG TABS tablet  Take 1 tablet by mouth 2 times daily             aspirin 81 MG tablet  Take 81 mg by mouth daily             clopidogrel (PLAVIX) 75 MG tablet  TAKE 1 TABLET BY MOUTH  DAILY             clotrimazole-betamethasone (LOTRISONE) 1-0.05 % cream  APPLY TOPICALLY 2 TIMES DAILY. Cranberry 600 MG TABS  Take by mouth 3 times daily             dofetilide (TIKOSYN) 500 MCG capsule  Take 1 capsule by mouth 2 times daily             dofetilide (TIKOSYN) 500 MCG capsule  Take 1 capsule by mouth every 12 hours             furosemide (LASIX) 80 MG tablet  Take 1 tablet by mouth daily             lisinopril (PRINIVIL;ZESTRIL) 2.5 MG tablet  Take 1 tablet by mouth daily             potassium chloride (KLOR-CON M) 20 MEQ extended release tablet  Take 1 tablet by mouth daily             rosuvastatin (CRESTOR) 20 MG tablet  Take 1 tablet by mouth nightly             Specialty Vitamins Products (PROSTATE PO)  Take 40 mg by mouth 2 times daily             tamsulosin (FLOMAX) 0.4 MG capsule  Take 0.4 mg by mouth 2 times daily.              TURMERIC PO  Take 40 mg by mouth 2 times daily             vitamin D (ERGOCALCIFEROL) 1.25 MG (39729 UT) CAPS capsule  TAKE ONE CAPSULE BY MOUTH ONE TIME PER WEEK               Current Discharge Medication List      START taking these medications    Details lisinopril (PRINIVIL;ZESTRIL) 2.5 MG tablet Take 1 tablet by mouth daily  Qty: 30 tablet, Refills: 0      !! dofetilide (TIKOSYN) 500 MCG capsule Take 1 capsule by mouth 2 times daily  Qty: 14 capsule, Refills: 0      !! dofetilide (TIKOSYN) 500 MCG capsule Take 1 capsule by mouth every 12 hours  Qty: 180 capsule, Refills: 3       !! - Potential duplicate medications found. Please discuss with provider. CONTINUE these medications which have CHANGED    Details   apixaban (ELIQUIS) 5 MG TABS tablet Take 1 tablet by mouth 2 times daily  Qty: 180 tablet, Refills: 3      furosemide (LASIX) 80 MG tablet Take 1 tablet by mouth daily  Qty: 90 tablet, Refills: 3         CONTINUE these medications which have NOT CHANGED    Details   clopidogrel (PLAVIX) 75 MG tablet TAKE 1 TABLET BY MOUTH  DAILY  Qty: 90 tablet, Refills: 3    Comments: Requesting 1 year supply      potassium chloride (KLOR-CON M) 20 MEQ extended release tablet Take 1 tablet by mouth daily  Qty: 90 tablet, Refills: 3      rosuvastatin (CRESTOR) 20 MG tablet Take 1 tablet by mouth nightly  Qty: 90 tablet, Refills: 3    Comments: Requesting 1 year supply  Associated Diagnoses: Essential hypertension; Coronary artery disease involving native coronary artery of native heart without angina pectoris; Mixed hyperlipidemia      TURMERIC PO Take 40 mg by mouth 2 times daily      Specialty Vitamins Products (PROSTATE PO) Take 40 mg by mouth 2 times daily      Cranberry 600 MG TABS Take by mouth 3 times daily      aspirin 81 MG tablet Take 81 mg by mouth daily      tamsulosin (FLOMAX) 0.4 MG capsule Take 0.4 mg by mouth 2 times daily. vitamin D (ERGOCALCIFEROL) 1.25 MG (28078 UT) CAPS capsule TAKE ONE CAPSULE BY MOUTH ONE TIME PER WEEK  Qty: 4 capsule, Refills: 5    Associated Diagnoses: Vitamin D deficiency      clotrimazole-betamethasone (LOTRISONE) 1-0.05 % cream APPLY TOPICALLY 2 TIMES DAILY.   Qty: 45 g, Refills: 1    Associated Diagnoses: Rash

## 2021-04-24 NOTE — PLAN OF CARE
Problem:  Activity:  Goal: Ability to tolerate increased activity will improve  Description: Ability to tolerate increased activity will improve  Outcome: Ongoing  Note: Pt UAL able to tolerate ambulation     Problem: Cardiac:  Goal: Complications related to the disease process, condition or treatment will be avoided or minimized  Description: Complications related to the disease process, condition or treatment will be avoided or minimized  Outcome: Ongoing  Note: 2 stents placed, R groin site intact     Problem: Physical Regulation:  Goal: Complications related to the disease process, condition or treatment will be avoided or minimized  Description: Complications related to the disease process, condition or treatment will be avoided or minimized  Outcome: Ongoing  Note: 2 stents placed, R groin site intact

## 2021-04-26 ENCOUNTER — TELEPHONE (OUTPATIENT)
Dept: PRIMARY CARE CLINIC | Age: 66
End: 2021-04-26

## 2021-04-26 ENCOUNTER — TELEPHONE (OUTPATIENT)
Dept: CARDIOLOGY CLINIC | Age: 66
End: 2021-04-26

## 2021-04-26 NOTE — TELEPHONE ENCOUNTER
[facility] Transitions Initial Follow Up Call    Outreach made within 2 business days of discharge: Yes    Patient: Hector Phillips Patient : 1955   MRN: [id number]  Reason for Admission: There are no discharge diagnoses documented for the most recent discharge. Discharge Date: 21       Spoke with: patient    Discharge department/facility: [address] [place] [address] Interactive Patient Contact:  Was patient able to fill all prescriptions: Yes  Was patient instructed to bring all medications to the follow-up visit: Yes  Is patient taking all medications as directed in the discharge summary?  Yes  Does patient understand their discharge instructions: Yes  Does patient have questions or concerns that need addressed prior to 7-14 day follow up office visit: no    Scheduled appointment with PCP within 7-14 days    Follow Up  Future Appointments   Date Time Provider Munira Maya   2021  9:50 AM [facility] [address] FLU [facility]   2021 10:00 AM SCHEDULE, [facility] 113 FLU [facility]   2021  7:30 AM SCHEDULE, [facility] CATH LAB ROOM 3 [facility]   2021  1:30 PM Kareen Shrestha APRN - CNP [place]   2021  9:00 AM MD Lorene Carlin [address] [place] [address]

## 2021-04-26 NOTE — TELEPHONE ENCOUNTER
Patient called to report that he has had fleeting episodes of AFIB over the weekend. He had an episode on 4/25-4:44p lasting 15 seconds, and two more today-one lasting 10 seconds, and 2nd lasting 5 sec. He was recently admitted for Tikosyn initiation on friday, and will also be undergoing afib ablation on 5/19/2021.

## 2021-04-27 ENCOUNTER — IMMUNIZATION (OUTPATIENT)
Dept: PRIMARY CARE CLINIC | Age: 66
End: 2021-04-27
Payer: COMMERCIAL

## 2021-04-27 PROCEDURE — 0012A COVID-19, MODERNA VACCINE 100MCG/0.5ML DOSE: CPT | Performed by: FAMILY MEDICINE

## 2021-04-27 PROCEDURE — 91301 COVID-19, MODERNA VACCINE 100MCG/0.5ML DOSE: CPT | Performed by: FAMILY MEDICINE

## 2021-04-29 RX ORDER — DOFETILIDE 0.5 MG/1
500 CAPSULE ORAL EVERY 12 HOURS SCHEDULED
Qty: 180 CAPSULE | Refills: 3 | Status: SHIPPED | OUTPATIENT
Start: 2021-04-29 | End: 2021-05-03 | Stop reason: SDUPTHER

## 2021-04-29 NOTE — TELEPHONE ENCOUNTER
[facility] Medication Refills:       dofetilide (TIKOSYN) 500 MCG capsule   Take 1 capsule by mouth every 12 hours, Disp-180 capsule, R-3     Pharmacy:[address] [phone] Sharyle Costa [phone]      Last Office Visit: 04/13/21    Next Office Visit: 05/27/21    Last Refill: 04/23/21    Last Labs: 04/24/21    The pharmacy did not receive the prescription, please resend

## 2021-05-03 RX ORDER — FUROSEMIDE 80 MG
80 TABLET ORAL DAILY
Qty: 90 TABLET | Refills: 3 | Status: SHIPPED | OUTPATIENT
Start: 2021-05-03 | End: 2022-05-24 | Stop reason: SDUPTHER

## 2021-05-03 RX ORDER — DOFETILIDE 0.5 MG/1
500 CAPSULE ORAL EVERY 12 HOURS SCHEDULED
Qty: 180 CAPSULE | Refills: 3 | Status: SHIPPED | OUTPATIENT
Start: 2021-05-03 | End: 2021-08-16 | Stop reason: SDUPTHER

## 2021-05-03 NOTE — TELEPHONE ENCOUNTER
Requested Prescriptions     Pending Prescriptions Disp Refills    dofetilide (TIKOSYN) 500 MCG capsule 180 capsule 3     Sig: Take 1 capsule by mouth every 12 hours          Number: 180    Refills: 3    Last Office Visit: 4/13/2021     Next Office Visit: 5/27/2021       Last Labs: 4/24/21

## 2021-05-03 NOTE — TELEPHONE ENCOUNTER
Patient needs a refill of        dofetilide (TIKOSYN) 500 MCG capsule  Take 1 capsule by mouth every 12 hours, Disp-180 capsule, R-3  Normal Last Dose: Not Recorded  Refills:3 ordered     [address] [facility] [phone] Paty Benton [phone]  Would like 3 month supply  Last appt  4-13-21  Next appt  5-27-21  Last filled  4-29-21

## 2021-05-10 ENCOUNTER — OFFICE VISIT (OUTPATIENT)
Dept: PRIMARY CARE CLINIC | Age: 66
End: 2021-05-10
Payer: COMMERCIAL

## 2021-05-10 VITALS
BODY MASS INDEX: 37.26 KG/M2 | OXYGEN SATURATION: 98 % | WEIGHT: 282.4 LBS | SYSTOLIC BLOOD PRESSURE: 102 MMHG | HEART RATE: 74 BPM | DIASTOLIC BLOOD PRESSURE: 70 MMHG

## 2021-05-10 DIAGNOSIS — E78.9 LIPID DISORDER: Primary | ICD-10-CM

## 2021-05-10 DIAGNOSIS — R97.20 ABNORMAL PSA: ICD-10-CM

## 2021-05-10 DIAGNOSIS — R73.09 HIGH GLUCOSE: ICD-10-CM

## 2021-05-10 DIAGNOSIS — Z79.01 CHRONIC ANTICOAGULATION: ICD-10-CM

## 2021-05-10 DIAGNOSIS — I48.11 LONGSTANDING PERSISTENT ATRIAL FIBRILLATION (HCC): ICD-10-CM

## 2021-05-10 DIAGNOSIS — Z95.5 S/P CORONARY ARTERY STENT PLACEMENT: ICD-10-CM

## 2021-05-10 PROCEDURE — 99214 OFFICE O/P EST MOD 30 MIN: CPT | Performed by: FAMILY MEDICINE

## 2021-05-10 ASSESSMENT — PATIENT HEALTH QUESTIONNAIRE - PHQ9
1. LITTLE INTEREST OR PLEASURE IN DOING THINGS: 0
SUM OF ALL RESPONSES TO PHQ QUESTIONS 1-9: 0
SUM OF ALL RESPONSES TO PHQ9 QUESTIONS 1 & 2: 0
SUM OF ALL RESPONSES TO PHQ QUESTIONS 1-9: 0

## 2021-05-10 ASSESSMENT — ENCOUNTER SYMPTOMS
ABDOMINAL PAIN: 0
EYES NEGATIVE: 1
APNEA: 1
BACK PAIN: 0

## 2021-05-10 NOTE — PROGRESS NOTES
SUBJECTIVE:  Patient ID: Danny Parr is a 77 y.o. male.   Chief Complaint:  Chief Complaint   Patient presents with    Follow-Up from Hospital     Afib Oriental orthodox    Numbness     right hand middle finger, wakes him up at night with pain       HPI   60year old Male  Recent two stent Dr Jerona Aase 3/17/2021  New Dx Persistent A fib New Rx Tikosyn 500 mg Q 12 Hours & Rx Eliquis 5 mg bid  Rx Lisinopril 2.5 mg qd  Ablation is schedule 5/19/2021  Hx ED used take Rx Cialis in past     Past Medical History:   Diagnosis Date    CAD (coronary artery disease)     Hyperlipidemia     Hypertension     S/P colonoscopy sept 2010    BN    Shoulder injury     LEFT & RIGHT /Dr Maral Fernandez (football)    Torn meniscus     LT /Dr Maral Fernandez (football)     Past Surgical History:   Procedure Laterality Date    BACK SURGERY Bilateral 2014    SCIATIC NERVE     COLONOSCOPY N/A 9/3/2019    COLONOSCOPY WITH BIOPSY performed by Laura De Jesus MD at [address] [place] [address]  2006    x2    ENTEROSCOPY N/A 10/1/2019    PUSH ENTEROSCOPY BIOPSY SMALL BOWEL performed by Laura De Jesus MD at . [facility] Left 07/2019    [address] - TRIGGER    HAND SURGERY  2005,2010    thumb ,nando trigger    HEMORRHOID SURGERY  2010    KNEE SURGERY  6592,7725,4975    RT x 3 one scope & 2 major    TOTAL KNEE ARTHROPLASTY Right 12/2014    UPPER GASTROINTESTINAL ENDOSCOPY N/A 9/25/2018    EGD BIOPSY GASTRIC FOR H PYLORI performed by Laura De Jesus MD at [facility] N/A 12/18/2018    EGD BIOPSY performed by Laura De Jesus MD at [facility] N/A 9/3/2019    EGD BIOPSY performed by Laura De Jesus MD at [facility] ENDOSCOPY     No Known Allergies    Family History   Problem Relation Age of Onset    Cancer Mother         Ovarian ca @69    Other Father         age 79 +respiratory Failure /mine work     Social History     Social History Narrative Lab Results   Component Value Date    WBC 6.1 04/24/2021    HGB 13.6 04/24/2021    HCT 39.8 (L) 04/24/2021    MCV 91.5 04/24/2021     04/24/2021     Lab Results   Component Value Date    CHOL 125 04/23/2021    CHOL 143 03/10/2021    CHOL 191 02/12/2020     Lab Results   Component Value Date    TRIG 68 04/23/2021    TRIG 66 03/10/2021    TRIG 97 02/12/2020     Lab Results   Component Value Date    HDL 45 04/23/2021    HDL 51 03/10/2021    HDL 60 02/12/2020     Lab Results   Component Value Date    LDLCALC 66 04/23/2021    LDLCALC 79 03/10/2021    LDLCALC 112 (H) 02/12/2020     Lab Results   Component Value Date    LABVLDL 14 04/23/2021    LABVLDL 13 03/10/2021    LABVLDL 19 02/12/2020     Lab Results   Component Value Date    CHOLHDLRATIO 3.0 02/13/2019    CHOLHDLRATIO 2.6 09/07/2017    CHOLHDLRATIO 2.7 01/26/2016       Chemistry        Component Value Date/Time     04/24/2021 0449    K 4.3 04/24/2021 0449    K 4.3 04/24/2021 0449     04/24/2021 0449    CO2 23 04/24/2021 0449    BUN 10 04/24/2021 0449    CREATININE 0.8 04/24/2021 0449        Component Value Date/Time    CALCIUM 8.8 04/24/2021 0449    ALKPHOS 63 03/04/2021 1519    AST 20 03/04/2021 1519    ALT 14 03/04/2021 1519    BILITOT 0.3 03/04/2021 1519          Lab Results   Component Value Date    PSA 7.21 (H) 02/12/2020    PSA 5.93 (H) 02/09/2015    PSA 6.1 (H) 07/31/2012     Lab Results   Component Value Date    TSH 2.05 07/26/2019     Lab Results   Component Value Date    LABA1C 5.7 02/12/2020     Lab Results   Component Value Date    .9 02/12/2020       Review of Systems   Constitutional:        Good  No pain  No Short of breath  No cardiac rehab  Weight disorder   HENT: Negative. Eyes: Negative. Respiratory: Positive for apnea. CPAP   Cardiovascular:        S/P recent coronary stent x 2   Ever Stalling  Rx Eliquis  Dx A fib  Ablation is schedule next week   Gastrointestinal: Negative for abdominal pain.         BM regular   Endocrine: Negative. Genitourinary: Negative for dysuria, flank pain and urgency. Urology care  ED  Rx Cialis   Musculoskeletal: Negative for back pain and neck pain. Knee Replacement Rt  Other Knee may need replacement in future  RT Middle finger tingly off/on   Skin: Negative for rash. Allergic/Immunologic: Positive for environmental allergies. Spring allergy off/on   Neurological: Negative for dizziness and headaches. Hematological: Negative. Psychiatric/Behavioral: Negative for behavioral problems, self-injury and sleep disturbance. Good support system  One daughter lives local  Other one she will move to [facility]       OBJECTIVE:  /70 (Site: Right Upper Arm, Position: Sitting, Cuff Size: Large Adult)   Pulse 74   Wt 282 lb 6.4 oz (128.1 kg)   SpO2 98%   BMI 37.26 kg/m²   Physical Exam  Constitutional:       Comments: BMI 37   HENT:      Head: Normocephalic. Right Ear: Tympanic membrane normal.      Left Ear: Tympanic membrane normal.   Eyes:      Extraocular Movements: Extraocular movements intact. Pupils: Pupils are equal, round, and reactive to light. Neck:      Musculoskeletal: Normal range of motion and neck supple. Cardiovascular:      Heart sounds: Normal heart sounds. Comments: Peripheral swelling Lt >RT  Pulmonary:      Effort: Pulmonary effort is normal.      Breath sounds: Normal breath sounds. No wheezing or rhonchi. Musculoskeletal:      Comments: Peripheral edema   Skin:     Findings: No erythema or rash. Neurological:      General: No focal deficit present. Mental Status: He is alert and oriented to person, place, and time. Psychiatric:         Mood and Affect: Mood normal.         Behavior: Behavior normal.         Thought Content: Thought content normal.         Judgment: Judgment normal.         ASSESSMENT/PLAN:      Diagnosis Orders   1. Lipid disorder     2.  Longstanding persistent atrial fibrillation ([facility] [address] 3. Chronic anticoagulation     4. S/P coronary artery stent placement     5. Abnormal PSA  PSA, Prostatic Specific Antigen   6. BMI 37.0-37.9, adult     7.  High glucose  Hemoglobin A1C     Lipid disorder compliant with Rx No side effect Last Lipid test 4/23/2021 Good level  Continue current Rx  Abnormal PSA  Due for Urology Visit & Lab update  Get A1C update   S/P Coronary stent Rx Continue Rx Eliquis & Rx Tykosyn  Pt has Ablation procedure schedule next week  Visit 30 minutes

## 2021-05-17 ENCOUNTER — TELEPHONE (OUTPATIENT)
Dept: CARDIOLOGY CLINIC | Age: 66
End: 2021-05-17

## 2021-05-17 NOTE — TELEPHONE ENCOUNTER
Spoke with pt and confirmed  Afib Ablation on 05/19/2021, pt arriving at [facility] at 06:00am.  Reviewed all preop instructions previously given to pt. COVID testing, CTA, and labs to be completed. Pt denies missing any doses of Eliquis and advised to hold [address] the morning of the procedure. Pt verbalized understanding.

## 2021-05-18 ENCOUNTER — HOSPITAL ENCOUNTER (OUTPATIENT)
Dept: CT IMAGING | Age: 66
Discharge: HOME | End: 2021-05-18
Payer: COMMERCIAL

## 2021-05-18 ENCOUNTER — TELEPHONE (OUTPATIENT)
Dept: CARDIOLOGY CLINIC | Age: 66
End: 2021-05-18

## 2021-05-18 DIAGNOSIS — I48.19 PERSISTENT ATRIAL FIBRILLATION (HCC): ICD-10-CM

## 2021-05-18 PROCEDURE — 71275 CT ANGIOGRAPHY CHEST: CPT

## 2021-05-18 PROCEDURE — 6360000004 HC RX CONTRAST MEDICATION: Performed by: FAMILY MEDICINE

## 2021-05-18 RX ADMIN — IOPAMIDOL 85 ML: 755 INJECTION, SOLUTION INTRAVENOUS at 14:57

## 2021-05-18 NOTE — TELEPHONE ENCOUNTER
This patient called with medication questions regarding his procedure tomorrow.  Please return call [phone]

## 2021-05-19 ENCOUNTER — HOSPITAL ENCOUNTER (OUTPATIENT)
Dept: CARDIAC CATH/INVASIVE PROCEDURES | Age: 66
Discharge: HOME | End: 2021-05-21
Payer: COMMERCIAL

## 2021-05-19 ENCOUNTER — ANESTHESIA EVENT (OUTPATIENT)
Dept: CARDIAC CATH/INVASIVE PROCEDURES | Age: 66
End: 2021-05-19

## 2021-05-19 ENCOUNTER — ANESTHESIA (OUTPATIENT)
Dept: CARDIAC CATH/INVASIVE PROCEDURES | Age: 66
End: 2021-05-19

## 2021-05-19 VITALS — BODY MASS INDEX: 35.39 KG/M2 | TEMPERATURE: 98 F | HEIGHT: 73 IN | WEIGHT: 267 LBS

## 2021-05-19 VITALS — OXYGEN SATURATION: 100 % | TEMPERATURE: 97.3 F | DIASTOLIC BLOOD PRESSURE: 129 MMHG | SYSTOLIC BLOOD PRESSURE: 215 MMHG

## 2021-05-19 LAB
ANION GAP SERPL CALCULATED.3IONS-SCNC: 10 MMOL/L (ref 3–16)
BUN BLDV-MCNC: 14 MG/DL (ref 7–20)
CALCIUM SERPL-MCNC: 9 MG/DL (ref 8.3–10.6)
CHLORIDE BLD-SCNC: 103 MMOL/L (ref 99–110)
CO2: 25 MMOL/L (ref 21–32)
CREAT SERPL-MCNC: 0.7 MG/DL (ref 0.8–1.3)
EKG ATRIAL RATE: 48 BPM
EKG DIAGNOSIS: NORMAL
EKG P AXIS: 51 DEGREES
EKG P-R INTERVAL: 196 MS
EKG Q-T INTERVAL: 496 MS
EKG QRS DURATION: 102 MS
EKG QTC CALCULATION (BAZETT): 443 MS
EKG R AXIS: 69 DEGREES
EKG T AXIS: 65 DEGREES
EKG VENTRICULAR RATE: 48 BPM
GFR AFRICAN AMERICAN: >60
GFR NON-AFRICAN AMERICAN: >60
GLUCOSE BLD-MCNC: 96 MG/DL (ref 70–99)
HCT VFR BLD CALC: 39.2 % (ref 40.5–52.5)
HEMOGLOBIN: 13.7 G/DL (ref 13.5–17.5)
INR BLD: 1.03 (ref 0.86–1.14)
MCH RBC QN AUTO: 31.6 PG (ref 26–34)
MCHC RBC AUTO-ENTMCNC: 34.9 G/DL (ref 31–36)
MCV RBC AUTO: 90.4 FL (ref 80–100)
PDW BLD-RTO: 13.1 % (ref 12.4–15.4)
PLATELET # BLD: 201 K/UL (ref 135–450)
PMV BLD AUTO: 9 FL (ref 5–10.5)
POC ACT LR: 295 SEC
POC ACT LR: 311 SEC
POC ACT LR: 320 SEC
POC ACT LR: 334 SEC
POC ACT LR: 357 SEC
POC ACT LR: 371 SEC
POC ACT LR: 373 SEC
POC ACT LR: 393 SEC
POC ACT LR: >400 SEC
POTASSIUM SERPL-SCNC: 3.7 MMOL/L (ref 3.5–5.1)
PROTHROMBIN TIME: 11.9 SEC (ref 10–13.2)
RBC # BLD: 4.33 M/UL (ref 4.2–5.9)
SODIUM BLD-SCNC: 138 MMOL/L (ref 136–145)
WBC # BLD: 5.3 K/UL (ref 4–11)

## 2021-05-19 PROCEDURE — G0269 OCCLUSIVE DEVICE IN VEIN ART: HCPCS

## 2021-05-19 PROCEDURE — 93622 COMP EP EVAL L VENTR PAC&REC: CPT

## 2021-05-19 PROCEDURE — 93662 INTRACARDIAC ECG (ICE): CPT

## 2021-05-19 PROCEDURE — 93005 ELECTROCARDIOGRAM TRACING: CPT | Performed by: INTERNAL MEDICINE

## 2021-05-19 PROCEDURE — 6360000002 HC RX W HCPCS

## 2021-05-19 PROCEDURE — C1759 CATH, INTRA ECHOCARDIOGRAPHY: HCPCS

## 2021-05-19 PROCEDURE — 3700000000 HC ANESTHESIA ATTENDED CARE

## 2021-05-19 PROCEDURE — 93656 COMPRE EP EVAL ABLTJ ATR FIB: CPT

## 2021-05-19 PROCEDURE — C1760 CLOSURE DEV, VASC: HCPCS

## 2021-05-19 PROCEDURE — 6370000000 HC RX 637 (ALT 250 FOR IP): Performed by: INTERNAL MEDICINE

## 2021-05-19 PROCEDURE — C1730 CATH, EP, 19 OR FEW ELECT: HCPCS

## 2021-05-19 PROCEDURE — 2500000003 HC RX 250 WO HCPCS: Performed by: NURSE ANESTHETIST, CERTIFIED REGISTERED

## 2021-05-19 PROCEDURE — 2709999900 HC NON-CHARGEABLE SUPPLY

## 2021-05-19 PROCEDURE — 93622 COMP EP EVAL L VENTR PAC&REC: CPT | Performed by: INTERNAL MEDICINE

## 2021-05-19 PROCEDURE — 93623 PRGRMD STIMJ&PACG IV RX NFS: CPT

## 2021-05-19 PROCEDURE — 85610 PROTHROMBIN TIME: CPT

## 2021-05-19 PROCEDURE — 93657 TX L/R ATRIAL FIB ADDL: CPT | Performed by: INTERNAL MEDICINE

## 2021-05-19 PROCEDURE — 93655 ICAR CATH ABLTJ DSCRT ARRHYT: CPT | Performed by: INTERNAL MEDICINE

## 2021-05-19 PROCEDURE — 6360000002 HC RX W HCPCS: Performed by: NURSE ANESTHETIST, CERTIFIED REGISTERED

## 2021-05-19 PROCEDURE — 93010 ELECTROCARDIOGRAM REPORT: CPT | Performed by: INTERNAL MEDICINE

## 2021-05-19 PROCEDURE — 3700000001 HC ADD 15 MINUTES (ANESTHESIA)

## 2021-05-19 PROCEDURE — 80048 BASIC METABOLIC PNL TOTAL CA: CPT

## 2021-05-19 PROCEDURE — 2500000003 HC RX 250 WO HCPCS

## 2021-05-19 PROCEDURE — C1732 CATH, EP, DIAG/ABL, 3D/VECT: HCPCS

## 2021-05-19 PROCEDURE — 93655 ICAR CATH ABLTJ DSCRT ARRHYT: CPT

## 2021-05-19 PROCEDURE — 93623 PRGRMD STIMJ&PACG IV RX NFS: CPT | Performed by: INTERNAL MEDICINE

## 2021-05-19 PROCEDURE — 85347 COAGULATION TIME ACTIVATED: CPT

## 2021-05-19 PROCEDURE — 93613 INTRACARDIAC EPHYS 3D MAPG: CPT

## 2021-05-19 PROCEDURE — 93613 INTRACARDIAC EPHYS 3D MAPG: CPT | Performed by: INTERNAL MEDICINE

## 2021-05-19 PROCEDURE — 2720000010 HC SURG SUPPLY STERILE

## 2021-05-19 PROCEDURE — 2580000003 HC RX 258: Performed by: NURSE ANESTHETIST, CERTIFIED REGISTERED

## 2021-05-19 PROCEDURE — 93656 COMPRE EP EVAL ABLTJ ATR FIB: CPT | Performed by: INTERNAL MEDICINE

## 2021-05-19 PROCEDURE — 93657 TX L/R ATRIAL FIB ADDL: CPT

## 2021-05-19 PROCEDURE — 93662 INTRACARDIAC ECG (ICE): CPT | Performed by: INTERNAL MEDICINE

## 2021-05-19 PROCEDURE — 85027 COMPLETE CBC AUTOMATED: CPT

## 2021-05-19 PROCEDURE — C1894 INTRO/SHEATH, NON-LASER: HCPCS

## 2021-05-19 PROCEDURE — 6360000002 HC RX W HCPCS: Performed by: INTERNAL MEDICINE

## 2021-05-19 RX ORDER — ONDANSETRON 2 MG/ML
INJECTION INTRAMUSCULAR; INTRAVENOUS PRN
Status: DISCONTINUED | OUTPATIENT
Start: 2021-05-19 | End: 2021-05-19 | Stop reason: SDUPTHER

## 2021-05-19 RX ORDER — METOPROLOL SUCCINATE 25 MG/1
25 TABLET, EXTENDED RELEASE ORAL DAILY
Qty: 90 TABLET | Refills: 3 | Status: SHIPPED | OUTPATIENT
Start: 2021-05-19 | End: 2021-07-23 | Stop reason: SDUPTHER

## 2021-05-19 RX ORDER — MORPHINE SULFATE 2 MG/ML
2 INJECTION, SOLUTION INTRAMUSCULAR; INTRAVENOUS ONCE
Status: COMPLETED | OUTPATIENT
Start: 2021-05-19 | End: 2021-05-19

## 2021-05-19 RX ORDER — PROPOFOL 10 MG/ML
INJECTION, EMULSION INTRAVENOUS PRN
Status: DISCONTINUED | OUTPATIENT
Start: 2021-05-19 | End: 2021-05-19 | Stop reason: SDUPTHER

## 2021-05-19 RX ORDER — PANTOPRAZOLE SODIUM 40 MG/1
40 TABLET, DELAYED RELEASE ORAL DAILY
Qty: 30 TABLET | Refills: 0 | Status: SHIPPED | OUTPATIENT
Start: 2021-05-19 | End: 2021-05-27 | Stop reason: ALTCHOICE

## 2021-05-19 RX ORDER — FENTANYL CITRATE 50 UG/ML
50 INJECTION, SOLUTION INTRAMUSCULAR; INTRAVENOUS EVERY 5 MIN PRN
Status: DISCONTINUED | OUTPATIENT
Start: 2021-05-19 | End: 2021-05-22 | Stop reason: HOSPADM

## 2021-05-19 RX ORDER — PROMETHAZINE HYDROCHLORIDE 25 MG/ML
6.25 INJECTION, SOLUTION INTRAMUSCULAR; INTRAVENOUS
Status: ACTIVE | OUTPATIENT
Start: 2021-05-19 | End: 2021-05-19

## 2021-05-19 RX ORDER — GLYCOPYRROLATE 0.2 MG/ML
INJECTION INTRAMUSCULAR; INTRAVENOUS PRN
Status: DISCONTINUED | OUTPATIENT
Start: 2021-05-19 | End: 2021-05-19 | Stop reason: SDUPTHER

## 2021-05-19 RX ORDER — ROCURONIUM BROMIDE 10 MG/ML
INJECTION, SOLUTION INTRAVENOUS PRN
Status: DISCONTINUED | OUTPATIENT
Start: 2021-05-19 | End: 2021-05-19 | Stop reason: SDUPTHER

## 2021-05-19 RX ORDER — SODIUM CHLORIDE 9 MG/ML
25 INJECTION, SOLUTION INTRAVENOUS PRN
Status: DISCONTINUED | OUTPATIENT
Start: 2021-05-19 | End: 2021-05-22 | Stop reason: HOSPADM

## 2021-05-19 RX ORDER — SODIUM CHLORIDE 0.9 % (FLUSH) 0.9 %
5-40 SYRINGE (ML) INJECTION EVERY 12 HOURS SCHEDULED
Status: DISCONTINUED | OUTPATIENT
Start: 2021-05-19 | End: 2021-05-22 | Stop reason: HOSPADM

## 2021-05-19 RX ORDER — FUROSEMIDE 10 MG/ML
INJECTION INTRAMUSCULAR; INTRAVENOUS PRN
Status: DISCONTINUED | OUTPATIENT
Start: 2021-05-19 | End: 2021-05-19 | Stop reason: SDUPTHER

## 2021-05-19 RX ORDER — SODIUM CHLORIDE, SODIUM LACTATE, POTASSIUM CHLORIDE, CALCIUM CHLORIDE 600; 310; 30; 20 MG/100ML; MG/100ML; MG/100ML; MG/100ML
INJECTION, SOLUTION INTRAVENOUS CONTINUOUS PRN
Status: DISCONTINUED | OUTPATIENT
Start: 2021-05-19 | End: 2021-05-19 | Stop reason: SDUPTHER

## 2021-05-19 RX ORDER — ONDANSETRON 2 MG/ML
4 INJECTION INTRAMUSCULAR; INTRAVENOUS
Status: ACTIVE | OUTPATIENT
Start: 2021-05-19 | End: 2021-05-19

## 2021-05-19 RX ORDER — SODIUM CHLORIDE 9 MG/ML
INJECTION, SOLUTION INTRAVENOUS CONTINUOUS
Status: DISCONTINUED | OUTPATIENT
Start: 2021-05-19 | End: 2021-05-22 | Stop reason: HOSPADM

## 2021-05-19 RX ORDER — HEPARIN SODIUM 10000 [USP'U]/100ML
INJECTION, SOLUTION INTRAVENOUS CONTINUOUS PRN
Status: DISCONTINUED | OUTPATIENT
Start: 2021-05-19 | End: 2021-05-19 | Stop reason: SDUPTHER

## 2021-05-19 RX ORDER — LABETALOL HYDROCHLORIDE 5 MG/ML
5 INJECTION, SOLUTION INTRAVENOUS EVERY 10 MIN PRN
Status: DISCONTINUED | OUTPATIENT
Start: 2021-05-19 | End: 2021-05-22 | Stop reason: HOSPADM

## 2021-05-19 RX ORDER — PROTAMINE SULFATE 10 MG/ML
INJECTION, SOLUTION INTRAVENOUS PRN
Status: DISCONTINUED | OUTPATIENT
Start: 2021-05-19 | End: 2021-05-19 | Stop reason: SDUPTHER

## 2021-05-19 RX ORDER — FENTANYL CITRATE 50 UG/ML
INJECTION, SOLUTION INTRAMUSCULAR; INTRAVENOUS PRN
Status: DISCONTINUED | OUTPATIENT
Start: 2021-05-19 | End: 2021-05-19 | Stop reason: SDUPTHER

## 2021-05-19 RX ORDER — SODIUM CHLORIDE 0.9 % (FLUSH) 0.9 %
5-40 SYRINGE (ML) INJECTION PRN
Status: DISCONTINUED | OUTPATIENT
Start: 2021-05-19 | End: 2021-05-22 | Stop reason: HOSPADM

## 2021-05-19 RX ORDER — DIPHENHYDRAMINE HYDROCHLORIDE 50 MG/ML
12.5 INJECTION INTRAMUSCULAR; INTRAVENOUS
Status: ACTIVE | OUTPATIENT
Start: 2021-05-19 | End: 2021-05-19

## 2021-05-19 RX ORDER — KETOROLAC TROMETHAMINE 30 MG/ML
30 INJECTION, SOLUTION INTRAMUSCULAR; INTRAVENOUS ONCE
Status: COMPLETED | OUTPATIENT
Start: 2021-05-19 | End: 2021-05-19

## 2021-05-19 RX ORDER — HEPARIN SODIUM 1000 [USP'U]/ML
INJECTION, SOLUTION INTRAVENOUS; SUBCUTANEOUS PRN
Status: DISCONTINUED | OUTPATIENT
Start: 2021-05-19 | End: 2021-05-19 | Stop reason: SDUPTHER

## 2021-05-19 RX ORDER — HYDRALAZINE HYDROCHLORIDE 20 MG/ML
5 INJECTION INTRAMUSCULAR; INTRAVENOUS EVERY 10 MIN PRN
Status: DISCONTINUED | OUTPATIENT
Start: 2021-05-19 | End: 2021-05-22 | Stop reason: HOSPADM

## 2021-05-19 RX ORDER — SUCCINYLCHOLINE CHLORIDE 20 MG/ML
INJECTION INTRAMUSCULAR; INTRAVENOUS PRN
Status: DISCONTINUED | OUTPATIENT
Start: 2021-05-19 | End: 2021-05-19 | Stop reason: SDUPTHER

## 2021-05-19 RX ORDER — ACETAMINOPHEN 325 MG/1
650 TABLET ORAL EVERY 4 HOURS PRN
Status: DISCONTINUED | OUTPATIENT
Start: 2021-05-19 | End: 2021-05-22 | Stop reason: HOSPADM

## 2021-05-19 RX ADMIN — KETOROLAC TROMETHAMINE 30 MG: 30 INJECTION, SOLUTION INTRAMUSCULAR; INTRAVENOUS at 12:35

## 2021-05-19 RX ADMIN — ROCURONIUM BROMIDE 25 MG: 10 INJECTION INTRAVENOUS at 10:49

## 2021-05-19 RX ADMIN — ROCURONIUM BROMIDE 25 MG: 10 INJECTION INTRAVENOUS at 09:08

## 2021-05-19 RX ADMIN — HEPARIN SODIUM 2000 UNITS/HR: 10000 INJECTION, SOLUTION INTRAVENOUS at 08:56

## 2021-05-19 RX ADMIN — SUGAMMADEX 200 MG: 100 INJECTION, SOLUTION INTRAVENOUS at 11:40

## 2021-05-19 RX ADMIN — ONDANSETRON 4 MG: 2 INJECTION INTRAMUSCULAR; INTRAVENOUS at 11:22

## 2021-05-19 RX ADMIN — HEPARIN SODIUM 10000 UNITS: 1000 INJECTION INTRAVENOUS; SUBCUTANEOUS at 08:37

## 2021-05-19 RX ADMIN — MORPHINE SULFATE 2 MG: 2 INJECTION, SOLUTION INTRAMUSCULAR; INTRAVENOUS at 13:57

## 2021-05-19 RX ADMIN — ROCURONIUM BROMIDE 25 MG: 10 INJECTION INTRAVENOUS at 08:32

## 2021-05-19 RX ADMIN — HEPARIN SODIUM 3000 UNITS: 1000 INJECTION INTRAVENOUS; SUBCUTANEOUS at 08:56

## 2021-05-19 RX ADMIN — SODIUM CHLORIDE, SODIUM LACTATE, POTASSIUM CHLORIDE, AND CALCIUM CHLORIDE: .6; .31; .03; .02 INJECTION, SOLUTION INTRAVENOUS at 10:48

## 2021-05-19 RX ADMIN — FENTANYL CITRATE 50 MCG: 50 INJECTION, SOLUTION INTRAMUSCULAR; INTRAVENOUS at 08:32

## 2021-05-19 RX ADMIN — SODIUM CHLORIDE, SODIUM LACTATE, POTASSIUM CHLORIDE, AND CALCIUM CHLORIDE: .6; .31; .03; .02 INJECTION, SOLUTION INTRAVENOUS at 07:43

## 2021-05-19 RX ADMIN — FUROSEMIDE 20 MG: 10 INJECTION, SOLUTION INTRAMUSCULAR; INTRAVENOUS at 11:22

## 2021-05-19 RX ADMIN — ROCURONIUM BROMIDE 50 MG: 10 INJECTION INTRAVENOUS at 08:11

## 2021-05-19 RX ADMIN — GLYCOPYRROLATE 24 MCG: 0.2 INJECTION, SOLUTION INTRAMUSCULAR; INTRAVENOUS at 07:51

## 2021-05-19 RX ADMIN — PROPOFOL 200 MG: 10 INJECTION, EMULSION INTRAVENOUS at 08:05

## 2021-05-19 RX ADMIN — FENTANYL CITRATE 50 MCG: 50 INJECTION, SOLUTION INTRAMUSCULAR; INTRAVENOUS at 10:58

## 2021-05-19 RX ADMIN — HEPARIN SODIUM 2000 UNITS: 1000 INJECTION INTRAVENOUS; SUBCUTANEOUS at 09:13

## 2021-05-19 RX ADMIN — ROCURONIUM BROMIDE 25 MG: 10 INJECTION INTRAVENOUS at 10:14

## 2021-05-19 RX ADMIN — HEPARIN SODIUM 2000 UNITS: 1000 INJECTION INTRAVENOUS; SUBCUTANEOUS at 10:26

## 2021-05-19 RX ADMIN — FENTANYL CITRATE 50 MCG: 50 INJECTION, SOLUTION INTRAMUSCULAR; INTRAVENOUS at 08:04

## 2021-05-19 RX ADMIN — MORPHINE SULFATE 2 MG: 2 INJECTION, SOLUTION INTRAMUSCULAR; INTRAVENOUS at 12:34

## 2021-05-19 RX ADMIN — FENTANYL CITRATE 50 MCG: 50 INJECTION, SOLUTION INTRAMUSCULAR; INTRAVENOUS at 11:28

## 2021-05-19 RX ADMIN — APIXABAN 5 MG: 5 TABLET, FILM COATED ORAL at 15:59

## 2021-05-19 RX ADMIN — SUCCINYLCHOLINE CHLORIDE 100 MG: 20 INJECTION, SOLUTION INTRAMUSCULAR; INTRAVENOUS; PARENTERAL at 08:06

## 2021-05-19 RX ADMIN — PROTAMINE SULFATE 30 MG: 10 INJECTION, SOLUTION INTRAVENOUS at 11:22

## 2021-05-19 ASSESSMENT — PULMONARY FUNCTION TESTS
PIF_VALUE: 17
PIF_VALUE: 15
PIF_VALUE: 16
PIF_VALUE: 1
PIF_VALUE: 17
PIF_VALUE: 17
PIF_VALUE: 15
PIF_VALUE: 15
PIF_VALUE: 2
PIF_VALUE: 1
PIF_VALUE: 17
PIF_VALUE: 16
PIF_VALUE: 17
PIF_VALUE: 16
PIF_VALUE: 16
PIF_VALUE: 17
PIF_VALUE: 20
PIF_VALUE: 18
PIF_VALUE: 17
PIF_VALUE: 17
PIF_VALUE: 15
PIF_VALUE: 17
PIF_VALUE: 18
PIF_VALUE: 17
PIF_VALUE: 15
PIF_VALUE: 16
PIF_VALUE: 16
PIF_VALUE: 14
PIF_VALUE: 16
PIF_VALUE: 15
PIF_VALUE: 16
PIF_VALUE: 17
PIF_VALUE: 16
PIF_VALUE: 17
PIF_VALUE: 17
PIF_VALUE: 16
PIF_VALUE: 2
PIF_VALUE: 16
PIF_VALUE: 15
PIF_VALUE: 1
PIF_VALUE: 23
PIF_VALUE: 17
PIF_VALUE: 17
PIF_VALUE: 3
PIF_VALUE: 17
PIF_VALUE: 17
PIF_VALUE: 1
PIF_VALUE: 16
PIF_VALUE: 1
PIF_VALUE: 14
PIF_VALUE: 17
PIF_VALUE: 15
PIF_VALUE: 20
PIF_VALUE: 16
PIF_VALUE: 1
PIF_VALUE: 15
PIF_VALUE: 15
PIF_VALUE: 20
PIF_VALUE: 17
PIF_VALUE: 16
PIF_VALUE: 16
PIF_VALUE: 17
PIF_VALUE: 15
PIF_VALUE: 17
PIF_VALUE: 4
PIF_VALUE: 17
PIF_VALUE: 15
PIF_VALUE: 16
PIF_VALUE: 16
PIF_VALUE: 17
PIF_VALUE: 15
PIF_VALUE: 15
PIF_VALUE: 17
PIF_VALUE: 16
PIF_VALUE: 15
PIF_VALUE: 6
PIF_VALUE: 15
PIF_VALUE: 17
PIF_VALUE: 16
PIF_VALUE: 15
PIF_VALUE: 17
PIF_VALUE: 16
PIF_VALUE: 17
PIF_VALUE: 17
PIF_VALUE: 18
PIF_VALUE: 15
PIF_VALUE: 17
PIF_VALUE: 15
PIF_VALUE: 15
PIF_VALUE: 16
PIF_VALUE: 17
PIF_VALUE: 17
PIF_VALUE: 15
PIF_VALUE: 17
PIF_VALUE: 16
PIF_VALUE: 15
PIF_VALUE: 17
PIF_VALUE: 15
PIF_VALUE: 17
PIF_VALUE: 17
PIF_VALUE: 2
PIF_VALUE: 16

## 2021-05-19 ASSESSMENT — PAIN SCALES - GENERAL
PAINLEVEL_OUTOF10: 7
PAINLEVEL_OUTOF10: 8

## 2021-05-19 NOTE — PRE SEDATION
Sedation Pre-Procedure Note    Patient Name: Maxine Staton   YOB: 1955  Room/Bed: Room/bed info not found  Medical Record Number: [id number]  Date: 5/19/2021   Time: 11:28 AM       Indication: Atrial fibrillation ablation    Consent: I have discussed with the patient and/or the patient representative the indication, alternatives, and the possible risks and/or complications of the planned procedure and the anesthesia methods. The patient and/or patient representative appear to understand and agree to proceed. Vital Signs:   Vitals:    05/19/21 0652   Temp: 98 °F (36.7 °C)       Past Medical History:   has a past medical history of CAD (coronary artery disease), Hyperlipidemia, Hypertension, S/P colonoscopy, Shoulder injury, and Torn meniscus. Past Surgical History:   has a past surgical history that includes knee surgery (8610,5655,8740); Hand surgery (4996,5232); Hemorrhoid surgery (2010); Coronary angioplasty with stent (2006); Upper gastrointestinal endoscopy (N/A, 9/25/2018); Upper gastrointestinal endoscopy (N/A, 12/18/2018); back surgery (Bilateral, 2014); Total knee arthroplasty (Right, 12/2014); Upper gastrointestinal endoscopy (N/A, 9/3/2019); Colonoscopy (N/A, 9/3/2019); Finger surgery (Left, 07/2019); and Enteroscopy (N/A, 10/1/2019). Medications:   Scheduled Meds:    sodium chloride flush  5-40 mL Intravenous 2 times per day     Continuous Infusions:    sodium chloride      sodium chloride       PRN Meds: sodium chloride, sodium chloride flush, HYDROmorphone, fentanNYL, HYDROmorphone, ondansetron, promethazine, diphenhydrAMINE, labetalol, hydrALAZINE  Home Meds:   Prior to Admission medications    Medication Sig Start Date End Date Taking?  Authorizing Provider   furosemide (LASIX) 80 MG tablet Take 1 tablet by mouth daily 5/3/21  Yes CORI Chatman CNP   dofetilide (TIKOSYN) 500 MCG capsule Take 1 capsule by mouth every 12 hours 5/3/21  Yes CORI Chatman CNP apixaban (ELIQUIS) 5 MG TABS tablet Take 1 tablet by mouth 2 times daily 4/25/21  Yes Dragan Salazar MD   lisinopril (PRINIVIL;ZESTRIL) 2.5 MG tablet Take 1 tablet by mouth daily 4/23/21  Yes CORI Snyder - CNP   potassium chloride (KLOR-CON M) 20 MEQ extended release tablet Take 1 tablet by mouth daily 10/26/20  Yes Pranav Londono MD   rosuvastatin (CRESTOR) 20 MG tablet Take 1 tablet by mouth nightly 10/26/20  Yes Pranav Londono MD   TURMERIC PO Take 40 mg by mouth 2 times daily   Yes Historical Provider, MD   Cranberry 600 MG TABS Take by mouth 3 times daily   Yes Historical Provider, MD   aspirin 81 MG tablet Take 81 mg by mouth daily   Yes Historical Provider, MD   tamsulosin (FLOMAX) 0.4 MG capsule Take 0.4 mg by mouth 2 times daily. Yes Historical Provider, MD   vitamin D (ERGOCALCIFEROL) 1.25 MG (46651 UT) CAPS capsule TAKE ONE CAPSULE BY MOUTH ONE TIME PER WEEK  Patient not taking: Reported on 5/10/2021 1/22/20   Kellie Mitchell MD   clotrimazole-betamethasone (LOTRISONE) 1-0.05 % cream APPLY TOPICALLY 2 TIMES DAILY. 7/19/19   Kellie Mitchell MD   Specialty Vitamins Products (PROSTATE PO) Take 40 mg by mouth 2 times daily    Historical Provider, MD     Coumadin Use Last 7 Days:  no  Antiplatelet drug therapy use last 7 days: no  Other anticoagulant use last 7 days: yes -Eliquis  Additional Medication Information: Tikosyn      Pre-Sedation Documentation and Exam:   I have personally completed a history, physical exam & review of systems for this patient (see notes). Vital signs have been reviewed (see flow sheet for vitals).     Mallampati Airway Assessment:  Mallampati Class I - (soft palate, fauces, uvula & anterior/posterior tonsillar pillars are visible)    Prior History of Anesthesia Complications:   none    ASA Classification:  Class 2 - A normal healthy patient with mild systemic disease    Sedation/ Anesthesia Plan:   general    Medications Planned:   propofol intravenously    Patient is an appropriate candidate for plan of sedation: yes    Electronically signed by Debora Patino MD on 5/19/2021 at 11:28 AM

## 2021-05-19 NOTE — ANESTHESIA POSTPROCEDURE EVALUATION
Department of Anesthesiology  Postprocedure Note    Patient: Patel Carmona  MRN: [id number]  YOB: 1955  Date of evaluation: 5/19/2021  Time:  2:02 PM     Procedure Summary     Date: 05/19/21 Room / Location: [facility] Cath Lab    Anesthesia Start: 3439 Anesthesia Stop: 9241    Procedure: [facility] A-FIB ABLATION W ANES Diagnosis:     Scheduled Providers: Deisy Weeks DO Responsible Provider: Krissy Cox DO    Anesthesia Type: general ASA Status: 3          Anesthesia Type: general    Yessica Phase I:      Yessica Phase II:      Last vitals: Reviewed and per EMR flowsheets.        Anesthesia Post Evaluation    Patient location during evaluation: bedside  Level of consciousness: awake  Pain score: 2  Airway patency: patent  Nausea & Vomiting: no nausea and no vomiting  Complications: no  Cardiovascular status: blood pressure returned to baseline  Respiratory status: acceptable  Hydration status: euvolemic

## 2021-05-19 NOTE — PROGRESS NOTES
[address]   Electrophysiology  Office Visit  Date: 5/27/2021    Chief Complaint   Patient presents with    Atrial Fibrillation    Coronary Artery Disease    Cardiomyopathy    Shortness of Breath       Cardiac HX: Meseret Davila is a 77 y.o. man with a h/o HTN, HLD, NATHAN on CPAP, CAD, s/p PCI (2006), follows with Dr. Pablito Wells, pAF ongoing since 2007, s/p unsuccessful DCCV (3/16/2021), EF dropped to 45%, MPI showed no reversible ischemia and an EF of 37%, s/p LHC w/ PCI x 2, (4/20/21, Dr. Pablito Wells), s/p RFA/PVI of AF/tAFL (5/19/2021, Dr. Gregory Ledezma). Interval History/HPI: Patient is here to follow-up for paroxysmal AF, CMP and CAD. He is a status post catheter ablation for atrial fibrillation a week ago. He states that he had some swallowing, shortness of breath and chest discomfort post procedure however now he is feeling great. He went back to work on Monday. His right groin is healed well. He felt a breakthrough of his atrial fibrillation on Saturday evening however it was very brief in nature and he has not felt any since. He denies shortness of breath, PND, orthopnea. He does have chronic lower extremity edema. He is asking about when he can start his exercise program back.     Home medications:   Current Outpatient Medications on File Prior to Visit   Medication Sig Dispense Refill    omeprazole (PRILOSEC) 10 MG delayed release capsule Take 10 mg by mouth daily      metoprolol succinate (TOPROL XL) 25 MG extended release tablet Take 1 tablet by mouth daily 90 tablet 3    furosemide (LASIX) 80 MG tablet Take 1 tablet by mouth daily 90 tablet 3    dofetilide (TIKOSYN) 500 MCG capsule Take 1 capsule by mouth every 12 hours 180 capsule 3    apixaban (ELIQUIS) 5 MG TABS tablet Take 1 tablet by mouth 2 times daily 180 tablet 3    potassium chloride (KLOR-CON M) 20 MEQ extended release tablet Take 1 tablet by mouth daily 90 tablet 3    rosuvastatin (CRESTOR) 20 MG tablet Take 1 tablet by mouth nightly 90 tablet 3    clotrimazole-betamethasone (LOTRISONE) 1-0.05 % cream APPLY TOPICALLY 2 TIMES DAILY. 45 g 1    TURMERIC PO Take 40 mg by mouth 2 times daily      Specialty Vitamins Products (PROSTATE PO) Take 40 mg by mouth 2 times daily      Cranberry 600 MG TABS Take by mouth 3 times daily      tamsulosin (FLOMAX) 0.4 MG capsule Take 0.4 mg by mouth 2 times daily.  lisinopril (PRINIVIL;ZESTRIL) 2.5 MG tablet Take 1 tablet by mouth daily (Patient not taking: Reported on 5/27/2021) 90 tablet 1    aspirin 81 MG tablet Take 81 mg by mouth daily (Patient not taking: Reported on 5/27/2021)       No current facility-administered medications on file prior to visit.        Past Medical History:   Diagnosis Date    CAD (coronary artery disease)     Hyperlipidemia     Hypertension     S/P colonoscopy sept 2010    BN    Shoulder injury     LEFT & RIGHT /Dr Portillo Leaks (football)    Torn meniscus     LT /Dr Portillo Leaks (football)        Past Surgical History:   Procedure Laterality Date    BACK SURGERY Bilateral 2014    SCIATIC NERVE     COLONOSCOPY N/A 9/3/2019    COLONOSCOPY WITH BIOPSY performed by Corrina Horn [facility]  2006    x2    ENTEROSCOPY N/A 10/1/2019    PUSH ENTEROSCOPY BIOPSY SMALL BOWEL performed by Corrina Horn MD at [facility] Left 07/2019    RING FINGER - TRIGGER    HAND SURGERY  2005,2010    thumb ,nando trigger    HEMORRHOID SURGERY  2010    KNEE SURGERY  7617,[id number],3079    RT x 3 one scope & 2 major    TOTAL KNEE ARTHROPLASTY Right 12/2014    UPPER GASTROINTESTINAL ENDOSCOPY N/A 9/25/2018    EGD BIOPSY GASTRIC FOR H PYLORI performed by Corrina Horn MD at [address] 12/18/2018    EGD BIOPSY performed by Corrina Horn MD at [address] 9/3/2019    EGD BIOPSY performed by Corrina Horn MD at [facility] 28       No Known Allergies    Social History:  Reviewed. reports that he has never smoked. He has never used smokeless tobacco. He reports that he does not drink alcohol and does not use drugs. Family History:  Reviewed. family history includes Cancer in his mother; Other in his father. Review of System:    · Constitutional: No fevers, chills. · Eyes: No visual changes or diplopia. No scleral icterus. · ENT: No Headaches. No mouth sores or sore throat. · Cardiovascular: No for chest pain, No for dyspnea on exertion, No for palpitations or No for loss of consciousness. No cough, hemoptysis, No for pleuritic pain, or phlebitis. · Respiratory: No for cough or wheezing. No hematemesis. · Gastrointestinal: No abdominal pain, blood in stools. · Genitourinary: No dysuria, or hematuria. · Musculoskeletal: No gait disturbance,    · Integumentary: No rash or pruritis. · Neurological: No headache, change in muscle strength, numbness or tingling. · Psychiatric: No anxiety, or depression. · Endocrine: No temperature intolerance. No excessive thirst, fluid intake, or urination. · Hem/Lymph: No abnormal bruising or bleeding, blood clots or swollen lymph nodes. · Allergic/Immunologic: No nasal congestion or hives. Physical Examination:  Vitals:    05/27/21 1322   BP: (!) 118/20   Pulse: (!) 45         Wt Readings from Last 3 Encounters:   05/27/21 287 lb 9.6 oz (130.5 kg)   05/19/21 267 lb (121.1 kg)   05/10/21 282 lb 6.4 oz (128.1 kg)       · Constitutional: Oriented. No distress. · Head: Normocephalic and atraumatic. · Mouth/Throat: Oropharynx is clear and moist.   · Eyes: Conjunctivae clear without jaunduice. PERRL. · Neck: Neck supple. No rigidity. No JVD present. · Cardiovascular: Normal rate, regular rhythm, S1&S2. · Pulmonary/Chest: Bilateral respiratory sounds. No wheezes, No rhonchi. · Abdominal: Soft. Bowel sounds present. No distension, No tenderness. · Musculoskeletal: No tenderness.  No edema    · Lymphadenopathy: Has no cervical adenopathy. · Neurological: Alert and oriented. Cranial nerve appears intact, No Gross deficit   · Skin: Skin is warm and dry. No rash noted. · Psychiatric: Has a normal mood, affect and behavior     Labs:  Reviewed. No results for input(s): NA, K, CL, CO2, PHOS, BUN, CREATININE in the last 72 hours. Invalid input(s): CA,  TSH  No results for input(s): WBC, HGB, HCT, MCV, PLT in the last 72 hours. Lab Results   Component Value Date    CKTOTAL 117 07/31/2012    TROPONINI <0.01 02/09/2021     No results found for: BNP  Lab Results   Component Value Date    PROTIME 11.9 05/19/2021    PROTIME 13.0 04/21/2021    PROTIME 14.2 04/15/2021    INR 1.03 05/19/2021    INR 1.12 04/21/2021    INR 1.22 04/15/2021     Lab Results   Component Value Date    CHOL 125 04/23/2021    HDL 45 04/23/2021    TRIG 68 04/23/2021       ECG: Personally reviewed: SB, HR 45, , , QTc 444    ECHO:  3.16.2021 (Limited)   Summary   Limited study. .   There is mildly increased left ventricular wall thickness. Overall left ventricular systolic function appears mildly reduced with   estimated LVEF of 45%. Mitral valve prolapse is present. Mild to moderate mitral regurgitation. The left atrial size appears dilated. There is no evidence of mass or   thrombus in the left atrium or appendage however there is spontaneous   contrast seen. No intracardiac thrombus was seen on this study. 11.15.2017 (Complete)   Summary   Left ventricle size is normal. There is mild concentric left ventricular   hypertrophy. Left ventricular function is normal with ejection fraction   estimated at 55%. No regional wall motion abnormalities are noted. Diastolic   filling parameters suggests grade I diastolic dysfunction. The aortic root   is dilated measuring 4.3 cm. Mild tricuspid regurgitation with RVSP   estimated at 35 mmHg.      Stress Test: 3.23.2021  Summary   Ephriam Brizuela is a small fixed perfusion defect at the inferoapical wall of the Left    ventricle consistent with infarction. There is no perfusion defect to    suggest ischemia.    The LVEF is reduced at 37% with global mild hypokinesis and moderate    hypokinesis at the inferoapical wall.    The TID is normal at 0.99.        This is an intermediate risk cardiac scan. Cardiac Angiography: N/A    Problem List:   Patient Active Problem List    Diagnosis Date Noted    NSVT (nonsustained ventricular tachycardia) ([address]     CAD in native artery     A-fib ([address] 04/20/2021    Persistent atrial fibrillation ([address] 04/20/2021    Morbidly obese ([address] 10/26/2020    Localized edema 11/14/2017    Hypertension 01/30/2013    Other tenosynovitis of hand and wrist 12/12/2012    Hypertrophy of prostate without urinary obstruction and other lower urinary tract symptoms (LUTS) 03/17/2010    Coronary atherosclerosis 10/05/2007    Other and unspecified hyperlipidemia 10/05/2007    Gino Teto cyst ([address] 11/22/2002        Assessment:   1. Persistent atrial fibrillation ([address]    2. On dofetilide therapy    3. Nonischemic cardiomyopathy ([address]    4. Nonsustained ventricular tachycardia ([address]    5. Coronary artery disease involving native coronary artery of native heart without angina pectoris          Cardiac HX: Mirian Phan is a 77 y.o. man with a h/o HTN, HLD, NATHAN on CPAP, CAD, s/p PCI (2006), follows with Dr. Darleen Hdz, pAF ongoing since 2007, s/p unsuccessful DCCV (3/16/2021), EF dropped to 45%, MPI showed no reversible ischemia and an EF of 37%, s/p LHC w/ PCI x 2, (4/20/21, Dr. Darleen Hdz), s/p RFA/PVI of AF/tAFL (5/19/2021, Dr. Celestina Holstein). [id number] 4.  TSH 2.37 (2/9/21).      AF  - In SB - HR 45 - asymptomatic  - S/p RFA/PVI of AF and typical AFL  - On Eliquis 5 mg twice daily - no s/s bleeding - continue  - On dofetilide 500 mcg BID - QTc 444   - On Toprol-XL 25 mg daily  - Potassium was 3.7 in the hospital, will check a BMP and magnesium  - ECG ordered and results personally reviewed      CMP/chronic sCHF  - EF 45%  - NYHA class I/II  -   - Possibly tachycardia induced  - On Toprol-XL 25 mg daily  - On low dose ACE     NSVT/CAD  - 26 beats of VT on tele  - S/p PCI x2  - On ASA, plavix and statin  - F/u with Dr. Graciela Goel in 4 weeks     EF of 56%  ACEi for systolic HF  ASA and Statin for CAD  Anticoagulation for AF and heart failure  No Tobacco use. All questions and concerns were addressed to the patient/family. Alternatives to my treatment were discussed. The note was completed using EMR. Every effort was made to ensure accuracy; however, inadvertent computerized transcription errors may be present. Patient received education regarding their diagnosis, treatment and medications while in the office today.       Klarissa Batista [address]

## 2021-05-19 NOTE — INTERVAL H&P NOTE
Update History & Physical    The patient's History and Physical of April 20,  was reviewed with the patient and I examined the patient. There was no change. The surgical site was confirmed by the patient and me. Plan: The risks, benefits, expected outcome, and alternative to the recommended procedure have been discussed with the patient. Patient understands and wants to proceed with the procedure.      Electronically signed by Tyrese Devlin MD on 5/19/2021 at 11:28 AM

## 2021-05-19 NOTE — PROCEDURES
[address]     Electrophysiology Procedure Note       Date of Procedure: 5/19/2021  Patient's Name: Philis Ormond  YOB: 1955   Medical Record Number: [id number]  Referring Physician: Marc Stevens MD  Procedure Performed by: Brendan Patricia MD    Procedure performed:  · Electrophysiology study with radiofrequency ablation of atrial fibrillation and pulmonary vein isolation   · Additional focal ablation for Atrial fibrillation, outside the pulmonary vein in coronary sinus and magalie terminalis  · Typical atrial flutter ablation  · 3-D electroanatomical mapping of the left atrium    · Transseptal puncture through an intact septum under intracardiac ultrasound guidance   · Intracardiac echocardiography. · Left ventricular pacing and recording  · Drug infusion with an attempt to induce arrhythmia and pacing  · Anesthesia: General anesthesia provided by the Anesthesia service  · EBL < 30 cc      Indications for procedure: Symptomatic atrial fibrillation, failed antiarrhythmic therapy and cardioversion in the past     Philis Ormond is a 77 y.o. male who has a history of persistent atrial fibrillation who is symptomatic with symptoms of dyspnea with minimal exertion and fatigue who has failed antiarrhythmic therapy in the past is now here for an ablation for persistent atrial fibrillation. Details of Procedure: The risks, benefits and alternatives of the ablation procedure were discussed with the patient. The risks including, but not limited to, the risks of bleeding, infection, radiation exposure, injury to vascular, cardiac and surrounding structures (including pneumothorax), stroke, cardiac perforation, tamponade, need for emergent open heart surgery, need for pacemaker implantation, esophageal injury and fistula, myocardial infarction and death were discussed in detail. The patient opted to proceed with the ablation. Written informed consent was signed and placed in the chart. placed in SVC, over the [address] catheter with EAM and ICE guidance. The dilator and a trans-septal Brookline C-1 NRG 98 cms needle was carefully advanced to the SVC. The entire assembly was carefully pulled down into the right atrium. Using 3D EAM and ICE guidance, a trans-septal catheterization was performed without difficulty. LA pressure was measured and was 17/13 mm Hg. A Penta-ray catheter was advanced to the left atrium via Brookline sheath. Using PentaRay catheter 3D electro anatomic mapping of the left atrium was performed. Phrenic nerve was mapped using high output pacing. Esophagus was mapped using Carto sound map and a mapping catheter in the esophagus. Left atrial appendage was also mapped. Baseline signals were recorded. Voltage mapping was also created. Then Pentaray catheter was removed and a 3.5 mm tip Thermocool ST/SF D/F curve catheter was advanced to the left atrium via [place] sheath. During sinus rhythm, we found that all four pulmonary veins (left superior, left inferior, right superior, and right inferior) had PV fascicles, the triggers for the atrial fibrillation. Then wide area circumferential ablation for the left sided pulmonary veins along with garret ablation were performed which resulted in electrical isolation of these pulmonary veins and eradication of the PV fascicles. Similarly, wide area circumferential ablations for the right sided pulmonary veins along with garret ablation were performed which resulted in electrical isolation of these pulmonary veins and eradication of the PV fascicles. Using Carto-smart touch module, care was taken to maintain the contact force between 8gms and 20gms during all the ablative lesions. Entrance block developed during RF energy delivery. Patient have frequent PACs, triggering into atrial fibrillation. These PACs are predominantly from the scar region, along the floor of the left atrium, along with coronary sinus.   This was ablated as a separate mechanism. Entrance and exit block were demonstrated in all 4 pulmonary veins. Maximum output (20mA) pacing in the L antrum and R antrum were performed and showed dissociation from the rest of the left atrium. This was checked circumferentially around the antrums and verified many times. Following this, additional radiofrequency ablation targeting CAFE potentials outside the pulmonary veins, on the posterior wall, floor of the left atrium, magalie terminalis of the right atrium and septum were performed as a separate mechanism    Adenosine was used in all 4 pulmonary veins to rule out any dormant conduction. During adenosine infusion, ablation catheter was placed into the left ventricle and pacing from the left ventricular was performed to obtain VA block. With adenosine, there is hemodynamic response and there is no evidence of document conduction. Secondary to recent history of PCI, status post PCI to right PDA, in the setting of nonsustained ventricular tachycardia, we decided not to use IV Isuprel. We then performed an EP study and programmed stimulation using our CS catheter and ablation cathter to assess the cardiac conduction system and to attempt to induce atrial tachydysrhythmia. His bundle potentials was recorded and pacing was performed from right atrium, coronary sinus and LV apex with the following results:     Sinus cycle length was 881 msec  LA interval was 193 msec  QRS duration was 108 msec  QT interval was 427 msec  AH interval was 73 msec  HV interval was 50 msec  Pacing from left atrium, 1:1 conduction over AV node with (AV wenckebach) was 370 msec  Pacing from LV apex, 1:1 retrograde conduction over AV node (VA wenckebach) was 400 msec      With proximal coronary sinus pacing, he was easily triggered into atrial flutter, with a cycle length ranging between 283 to 310 ms.   They are noted to have a proximal to distal activation of coronary sinus and hence consistent with typical right atrial flutter. However, there is no sustained atrial flutter. Hence, not able to perform entrainment/activation mapping. Secondary to this, we decided to proceed with a CTI ablation. All catheters and sheaths were removed to the right atrium. The heparin was stopped. The Thermocool Smart Touch ablation catheter was advanced over the cavotricuspid isthmus to the tricuspid annulus in an area with a large ventricular electrogram and a small atrial electrogram. Linear ablation was performed from this area to the inferior vena cava. Bidirectional block across the CTI was confirmed with differential pacing and activation mapping. Bidirectional block was confirmed by noting trans-isthmus conduction time of 156 ms from the CS to a focus just lateral to the line, and a conduction time of 140 ms from the CS to a focus more lateral to the ablated line. Similarly, when pacing from the high lateral RA, the conduction time to the CS was 134 ms compared to a transit time of 158 ms just lateral to the ablated line to the CS. When checking for the differential pacing, whenever I placed the catheter along the lateral part of the magalie terminalis, he degenerated into atrial fibrillation consistently. Hence, lower part of the magalie terminalis was ablated as a separate mechanism for atrial fibrillation. This was done with the guidance of intracardiac echocardiogram.    The ICE catheter was placed in the RV. There was no pericardial effusion. Protamine 30 mg was given. When the ACT was appropriate, catheters and sheaths were removed and hemostasis was obtained via Vascade MVP venous closure device. No complications noted.     Conclusion:     - Pre- and post-procedure diagnoses were persistent atrial fibrillation   - Pulmonary vein isolations using wide area circumferential radiofrequency ablation   - Additional focal ablation for atrial fibrillation, outside the pulmonary veins in coronary sinus and magalie terminalis  -Typical flutter ablation -cycle length of 283 ms  No dormant conduction with Adenosine    Plan:  Resume OAC with Eliquis  PPI for 30 days  Continue Tikosyn  Started on Toprol-XL 25 mg daily secondary to CAD and frequent PVCs  Continue Lasix  Bedrest for 2 hours  Observation for 4 hours  D/C after 4 hours, if stable. Thank you for allowing us to participate in the care of your patient. If you have any questions or concerns, please do not hesitate to contact me.     Levy Moffett MD   Cardiac Electrophysiology  [facility]  Office [phone]  [facility]

## 2021-05-19 NOTE — ANESTHESIA PRE PROCEDURE
Department of Anesthesiology  Preprocedure Note       Name:  Torrey Valencia   Age:  77 y.o.  :  1955                                          MRN:  [id number]         Date:  2021      Surgeon: * No surgeons listed *    Procedure: * No procedures listed *    Medications prior to admission:   Prior to Admission medications    Medication Sig Start Date End Date Taking? Authorizing Provider   furosemide (LASIX) 80 MG tablet Take 1 tablet by mouth daily 5/3/21  Yes Marcus Wolfe APRN - CNP   dofetilide (TIKOSYN) 500 MCG capsule Take 1 capsule by mouth every 12 hours 5/3/21  Yes Goodells Ambrosia APRN - CNP   apixaban (ELIQUIS) 5 MG TABS tablet Take 1 tablet by mouth 2 times daily 21  Yes Wicho Hyatt MD   lisinopril (PRINIVIL;ZESTRIL) 2.5 MG tablet Take 1 tablet by mouth daily 21  Yes Turner Andersen APRN - CNP   potassium chloride (KLOR-CON M) 20 MEQ extended release tablet Take 1 tablet by mouth daily 10/26/20  Yes Penelope Sarmiento MD   rosuvastatin (CRESTOR) 20 MG tablet Take 1 tablet by mouth nightly 10/26/20  Yes Penelope Sarmiento MD   TURMERIC PO Take 40 mg by mouth 2 times daily   Yes Historical Provider, MD   Cranberry 600 MG TABS Take by mouth 3 times daily   Yes Historical Provider, MD   aspirin 81 MG tablet Take 81 mg by mouth daily   Yes Historical Provider, MD   tamsulosin (FLOMAX) 0.4 MG capsule Take 0.4 mg by mouth 2 times daily. Yes Historical Provider, MD   vitamin D (ERGOCALCIFEROL) 1.25 MG (20263 UT) CAPS capsule TAKE ONE CAPSULE BY MOUTH ONE TIME PER WEEK  Patient not taking: Reported on 5/10/2021 1/22/20   Gem Wong MD   clotrimazole-betamethasone (LOTRISONE) 1-0.05 % cream APPLY TOPICALLY 2 TIMES DAILY.  19   Gem Wong MD   Specialty Vitamins Products (PROSTATE PO) Take 40 mg by mouth 2 times daily    Historical Provider, MD       Current medications:    Current Outpatient Medications   Medication Sig Dispense Refill    furosemide (LASIX) 80 MG tablet Take 1 tablet by mouth daily 90 tablet 3    dofetilide (TIKOSYN) 500 MCG capsule Take 1 capsule by mouth every 12 hours 180 capsule 3    apixaban (ELIQUIS) 5 MG TABS tablet Take 1 tablet by mouth 2 times daily 180 tablet 3    lisinopril (PRINIVIL;ZESTRIL) 2.5 MG tablet Take 1 tablet by mouth daily 30 tablet 0    potassium chloride (KLOR-CON M) 20 MEQ extended release tablet Take 1 tablet by mouth daily 90 tablet 3    rosuvastatin (CRESTOR) 20 MG tablet Take 1 tablet by mouth nightly 90 tablet 3    TURMERIC PO Take 40 mg by mouth 2 times daily      Cranberry 600 MG TABS Take by mouth 3 times daily      aspirin 81 MG tablet Take 81 mg by mouth daily      tamsulosin (FLOMAX) 0.4 MG capsule Take 0.4 mg by mouth 2 times daily.  vitamin D (ERGOCALCIFEROL) 1.25 MG (67994 UT) CAPS capsule TAKE ONE CAPSULE BY MOUTH ONE TIME PER WEEK (Patient not taking: Reported on 5/10/2021) 4 capsule 5    clotrimazole-betamethasone (LOTRISONE) 1-0.05 % cream APPLY TOPICALLY 2 TIMES DAILY.  45 g 1    Specialty Vitamins Products (PROSTATE PO) Take 40 mg by mouth 2 times daily       Current Facility-Administered Medications   Medication Dose Route Frequency Provider Last Rate Last Admin    0.9 % sodium chloride infusion   Intravenous Continuous Tyerse Devlin MD        0.9 % sodium chloride infusion  25 mL Intravenous PRN Tyrese Devlin MD        sodium chloride flush 0.9 % injection 5-40 mL  5-40 mL Intravenous 2 times per day Tyrees Devlin MD        sodium chloride flush 0.9 % injection 5-40 mL  5-40 mL Intravenous PRN Tyrese Devlin MD           Allergies:  No Known Allergies    Problem List:    Patient Active Problem List   Diagnosis Code    Coronary atherosclerosis I25.10    Other and unspecified hyperlipidemia E78.5    Hypertrophy of prostate without urinary obstruction and other lower urinary tract symptoms (LUTS) N40.0    Michelle Geovany cyst ([facility] [address] Q03.1    Other tenosynovitis of hand and wrist M65.849, M65.839    Hypertension I10    Localized edema R60.0    Morbidly obese (HCC) E66.01    A-fib (HCC) I48.91    Persistent atrial fibrillation (HCC) I48.19    NSVT (nonsustained ventricular tachycardia) (HCC) I47.2    CAD in native artery I25.10       Past Medical History:        Diagnosis Date    CAD (coronary artery disease)     Hyperlipidemia     Hypertension     S/P colonoscopy sept 2010    BN    Shoulder injury     LEFT & RIGHT /Dr Mary Blunt (football)    Torn meniscus     LT /Dr Mary Blunt (football)       Past Surgical History:        Procedure Laterality Date    BACK SURGERY Bilateral 2014    SCIATIC NERVE     COLONOSCOPY N/A 9/3/2019    COLONOSCOPY WITH BIOPSY performed by Julius Claude, MD at [facility] 19  2006    x2    ENTEROSCOPY N/A 10/1/2019    PUSH ENTEROSCOPY BIOPSY SMALL BOWEL performed by Julius Claude, MD at . [facility] 122 Left 07/2019    [address] - TRIGGER    HAND SURGERY  2005,2010    thumb ,nando trigger    HEMORRHOID SURGERY  2010    KNEE SURGERY  7030,8956,3300    RT x 3 one scope & 2 major    TOTAL KNEE ARTHROPLASTY Right 12/2014    UPPER GASTROINTESTINAL ENDOSCOPY N/A 9/25/2018    EGD BIOPSY GASTRIC FOR H PYLORI performed by Julius Claude, MD at [address] [place] [address] 12/18/2018    EGD BIOPSY performed by Julius Claude, MD at [address] [place] [address] N/A 9/3/2019    EGD BIOPSY performed [facility] Julius Claude, MD [facility] History:    Social History     Tobacco Use    Smoking status: Never Smoker    Smokeless tobacco: Never Used   Substance Use Topics    Alcohol use:  No                                Counseling given: Not Answered      Vital Signs (Current):   Vitals:    05/19/21 0652   Temp: 98 °F (36.7 °C)   TempSrc: Oral   Weight: 267 lb (121.1 kg)   Height: 6' 1\" (1.854 m) BP Readings from Last 3 Encounters:   05/10/21 102/70   04/24/21 127/69   04/13/21 118/74       NPO Status:                                                                                 BMI:   Wt Readings from Last 3 Encounters:   05/19/21 267 lb (121.1 kg)   05/10/21 282 lb 6.4 oz (128.1 kg)   04/20/21 271 lb (122.9 kg)     Body mass index is 35.23 kg/m². CBC:   Lab Results   Component Value Date    WBC 6.1 04/24/2021    RBC 4.35 04/24/2021    HGB 13.6 04/24/2021    HCT 39.8 04/24/2021    MCV 91.5 04/24/2021    RDW 12.9 04/24/2021     04/24/2021       CMP:   Lab Results   Component Value Date     04/24/2021    K 4.3 04/24/2021    K 4.3 04/24/2021     04/24/2021    CO2 23 04/24/2021    BUN 10 04/24/2021    CREATININE 0.8 04/24/2021    GFRAA >60 04/24/2021    AGRATIO 1.8 03/04/2021    LABGLOM >60 04/24/2021    LABGLOM 91 09/07/2017    GLUCOSE 94 04/24/2021    PROT 6.6 03/04/2021    PROT 6.6 07/31/2012    CALCIUM 8.8 04/24/2021    BILITOT 0.3 03/04/2021    ALKPHOS 63 03/04/2021    AST 20 03/04/2021    ALT 14 03/04/2021       POC Tests: No results for input(s): POCGLU, POCNA, POCK, POCCL, POCBUN, POCHEMO, POCHCT in the last 72 hours.     Coags:   Lab Results   Component Value Date    PROTIME 13.0 04/21/2021    INR 1.12 04/21/2021    APTT 26.4 06/25/2014       HCG (If Applicable): No results found for: PREGTESTUR, PREGSERUM, HCG, HCGQUANT     ABGs: No results found for: PHART, PO2ART, BMC1YKG, GGA2KZU, BEART, H1ZWJBAY     Type & Screen (If Applicable):  Lab Results   Component Value Date    LABABO AB 06/25/2014    [facility] VIVIAN Negative 06/25/2014       Drug/Infectious Status (If Applicable):  Lab Results   Component Value Date    HEPCAB NON-REACTIVE 02/13/2019       COVID-19 Screening (If Applicable):   Lab Results   Component Value Date    COVID19 Not Detected 03/10/2021    COVID19 NOT DETECTED 11/25/2020           Anesthesia Evaluation  Patient summary reviewed and Nursing notes reviewed no history of anesthetic complications:   Airway: Mallampati: II  TM distance: <3 FB     Mouth opening: > = 3 FB Dental: normal exam         Pulmonary:Negative Pulmonary ROS and normal exam  breath sounds clear to auscultation                             Cardiovascular:  Exercise tolerance: good (>4 METS),   (+) hypertension:, CAD:, CABG/stent:, dysrhythmias: atrial fibrillation,       NYHA Classification: II  ECG reviewed  Rhythm: irregular  Rate: normal  Echocardiogram reviewed                  Neuro/Psych:   Negative Neuro/Psych ROS              GI/Hepatic/Renal: Neg GI/Hepatic/Renal ROS            Endo/Other: Negative Endo/Other ROS                    Abdominal:           Vascular: negative vascular ROS. Anesthesia Plan      general     ASA 3       Induction: intravenous. Anesthetic plan and risks discussed with patient. Use of blood products discussed with patient whom consented to blood products. Plan discussed with CRNA.     Attending anesthesiologist reviewed and agrees with Pre Eval content              Nubia Suarez DO   5/19/2021

## 2021-05-20 ENCOUNTER — TELEPHONE (OUTPATIENT)
Dept: CARDIOLOGY CLINIC | Age: 66
End: 2021-05-20

## 2021-05-20 RX ORDER — LISINOPRIL 2.5 MG/1
2.5 TABLET ORAL DAILY
Qty: 30 TABLET | Refills: 0 | Status: SHIPPED | OUTPATIENT
Start: 2021-05-20 | End: 2021-05-20 | Stop reason: SDUPTHER

## 2021-05-20 RX ORDER — LISINOPRIL 2.5 MG/1
2.5 TABLET ORAL DAILY
Qty: 90 TABLET | Refills: 1 | Status: SHIPPED | OUTPATIENT
Start: 2021-05-20 | End: 2021-06-14

## 2021-05-27 ENCOUNTER — OFFICE VISIT (OUTPATIENT)
Dept: CARDIOLOGY CLINIC | Age: 66
End: 2021-05-27
Payer: COMMERCIAL

## 2021-05-27 VITALS
WEIGHT: 287.6 LBS | HEART RATE: 45 BPM | DIASTOLIC BLOOD PRESSURE: 20 MMHG | SYSTOLIC BLOOD PRESSURE: 118 MMHG | HEIGHT: 73 IN | BODY MASS INDEX: 38.12 KG/M2

## 2021-05-27 DIAGNOSIS — I42.8 NONISCHEMIC CARDIOMYOPATHY (HCC): ICD-10-CM

## 2021-05-27 DIAGNOSIS — I47.29 NONSUSTAINED VENTRICULAR TACHYCARDIA: ICD-10-CM

## 2021-05-27 DIAGNOSIS — Z79.899 ON DOFETILIDE THERAPY: ICD-10-CM

## 2021-05-27 DIAGNOSIS — I25.10 CORONARY ARTERY DISEASE INVOLVING NATIVE CORONARY ARTERY OF NATIVE HEART WITHOUT ANGINA PECTORIS: ICD-10-CM

## 2021-05-27 DIAGNOSIS — I48.19 PERSISTENT ATRIAL FIBRILLATION (HCC): Primary | ICD-10-CM

## 2021-05-27 PROCEDURE — 93000 ELECTROCARDIOGRAM COMPLETE: CPT | Performed by: NURSE PRACTITIONER

## 2021-05-27 PROCEDURE — 99214 OFFICE O/P EST MOD 30 MIN: CPT | Performed by: NURSE PRACTITIONER

## 2021-05-27 RX ORDER — OMEPRAZOLE 10 MG/1
10 CAPSULE, DELAYED RELEASE ORAL DAILY
COMMUNITY
End: 2022-02-21

## 2021-05-28 LAB
ANION GAP SERPL CALCULATED.3IONS-SCNC: 10 MMOL/L (ref 3–16)
BUN BLDV-MCNC: 9 MG/DL (ref 7–20)
CALCIUM SERPL-MCNC: 8.9 MG/DL (ref 8.3–10.6)
CHLORIDE BLD-SCNC: 103 MMOL/L (ref 99–110)
CO2: 27 MMOL/L (ref 21–32)
CREAT SERPL-MCNC: 0.8 MG/DL (ref 0.8–1.3)
GFR AFRICAN AMERICAN: >60
GFR NON-AFRICAN AMERICAN: >60
GLUCOSE BLD-MCNC: 87 MG/DL (ref 70–99)
MAGNESIUM: 2.1 MG/DL (ref 1.8–2.4)
POTASSIUM SERPL-SCNC: 4.6 MMOL/L (ref 3.5–5.1)
SODIUM BLD-SCNC: 140 MMOL/L (ref 136–145)

## 2021-06-14 NOTE — TELEPHONE ENCOUNTER
Requested Prescriptions     Pending Prescriptions Disp Refills    lisinopril (PRINIVIL;ZESTRIL) 2.5 MG tablet [Pharmacy Med Name: LISINOPRIL 2.5 MG TABLET] 30 tablet      Sig: TAKE 1 TABLET BY MOUTH EVERY DAY          Number: 30    Refills: 5    Last Office Visit: 5/27/2021     Next Office Visit: 8/24/2021     Last Labs: 5.27.2021     Please sign in [facility] [employer] [facility]'s absence

## 2021-06-15 ENCOUNTER — CLINICAL DOCUMENTATION (OUTPATIENT)
Dept: OTHER | Age: 66
End: 2021-06-15

## 2021-06-16 RX ORDER — LISINOPRIL 2.5 MG/1
TABLET ORAL
Qty: 30 TABLET | Refills: 5 | Status: SHIPPED | OUTPATIENT
Start: 2021-06-16 | End: 2021-08-16 | Stop reason: SDUPTHER

## 2021-06-30 ENCOUNTER — OFFICE VISIT (OUTPATIENT)
Dept: CARDIOLOGY CLINIC | Age: 66
End: 2021-06-30
Payer: COMMERCIAL

## 2021-06-30 VITALS
HEART RATE: 63 BPM | BODY MASS INDEX: 37.55 KG/M2 | WEIGHT: 284.6 LBS | DIASTOLIC BLOOD PRESSURE: 80 MMHG | SYSTOLIC BLOOD PRESSURE: 125 MMHG

## 2021-06-30 DIAGNOSIS — I48.19 PERSISTENT ATRIAL FIBRILLATION (HCC): ICD-10-CM

## 2021-06-30 DIAGNOSIS — I48.11 LONGSTANDING PERSISTENT ATRIAL FIBRILLATION (HCC): Primary | ICD-10-CM

## 2021-06-30 PROCEDURE — 93000 ELECTROCARDIOGRAM COMPLETE: CPT | Performed by: INTERNAL MEDICINE

## 2021-06-30 PROCEDURE — 99214 OFFICE O/P EST MOD 30 MIN: CPT | Performed by: INTERNAL MEDICINE

## 2021-06-30 RX ORDER — TADALAFIL 20 MG/1
20 TABLET ORAL DAILY PRN
Qty: 30 TABLET | Refills: 3 | Status: SHIPPED | OUTPATIENT
Start: 2021-06-30 | End: 2022-05-03 | Stop reason: SDUPTHER

## 2021-06-30 NOTE — PROGRESS NOTES
Hematological: Negative. Psychiatric/Behavioral: Negative. Same exam as 5/19/21  Objective:   Physical Exam   Nursing note and vitals reviewed. Constitutional: He is oriented to person, place, and time. He appears well-developed and well-nourished. No distress. HENT:   Head: Normocephalic and atraumatic. Mouth/Throat: No oropharyngeal exudate. Eyes: Conjunctivae are normal. Pupils are equal, round, and reactive to light. Right eye exhibits no discharge. Left eye exhibits no discharge. No scleral icterus. Neck: Normal range of motion. Neck supple. No JVD present. No tracheal deviation present. No thyromegaly present. Cardiovascular: irregularly irregular rhythm with normal s1 and S2, intact distal pulses. Exam reveals no gallop and no friction rub. No murmur heard. Pulmonary/Chest: Effort normal. No stridor. No respiratory distress. He has no wheezes. He has no rales. He exhibits no tenderness. Abdominal: Soft. Bowel sounds are normal. He exhibits no distension and no mass. No tenderness. He has no rebound and no guarding. Musculoskeletal: He exhibits LE edema and no tenderness. Lymphadenopathy:     He has no cervical adenopathy. Neurological: He is alert and oriented to person, place, and time. No cranial nerve deficit. Coordination normal.   Skin: Skin is warm and dry. No rash noted. He is not diaphoretic. No erythema. No pallor. Psychiatric: He has a normal mood and affect. His behavior is normal. Judgment and thought content normal.       Assessment:       Diagnosis Orders   1. Longstanding persistent atrial fibrillation (HCC)  EKG 12 Lead   2. Persistent atrial fibrillation (HCC)  EKG 12 Lead             Plan:      Continue current medications. Stable cardiovascular status. CAD: no chest pain after R PDA RADHA x 2 in 4/2021. Echo 11/15/17 was WNL. HTN: covered. Edema:   Continue 80 mg lasix and kcl. Atrial Fibrillation:  Now SR after ablation.      NATHAN with CPAP usage.  ED:  Try cialis 20 mg   Number 30 no RF.

## 2021-07-14 NOTE — TELEPHONE ENCOUNTER
Requested Prescriptions     Pending Prescriptions Disp Refills    metoprolol succinate (TOPROL XL) 25 MG extended release tablet 90 tablet 3     Sig: Take 1 tablet by mouth daily          Number: 90    Refills: 3    Last Office Visit: 5/27/2021     Next Office Visit: 8/24/2021     Last Labs: [phone]

## 2021-07-23 RX ORDER — METOPROLOL SUCCINATE 25 MG/1
25 TABLET, EXTENDED RELEASE ORAL DAILY
Qty: 90 TABLET | Refills: 3 | Status: SHIPPED | OUTPATIENT
Start: 2021-07-23 | End: 2022-07-20

## 2021-08-04 ENCOUNTER — TELEPHONE (OUTPATIENT)
Dept: CARDIOLOGY CLINIC | Age: 66
End: 2021-08-04

## 2021-08-13 ENCOUNTER — TELEPHONE (OUTPATIENT)
Dept: CARDIOLOGY CLINIC | Age: 66
End: 2021-08-13

## 2021-08-13 NOTE — TELEPHONE ENCOUNTER
Patient needs refills to go to [facility]:     dofetilide (TIKOSYN) 500 MCG capsule  Take 1 capsule by mouth every 12 hours, Disp-180 capsule, R-3  Normal Last Dose: Not Recorded  Refills:3 ordered [facility]     [address] [facility] [facility], [address], [address] - P [phone] - F [phone]       lisinopril (PRINIVIL;ZESTRIL) 2.5  MG tablet  TAKE 1 TABLET BY MOUTH EVERY DAY, Disp-30 tablet, R-5  Normal Last Dose: Not Recorded    Last visit: 6/20/21  Next visit: 8/24/21  Last labs: 5/27/21  Last filled: dofetilide 5/3/21; lisinopril 6/16/21

## 2021-08-16 RX ORDER — DOFETILIDE 0.5 MG/1
500 CAPSULE ORAL EVERY 12 HOURS SCHEDULED
Qty: 180 CAPSULE | Refills: 3 | Status: SHIPPED | OUTPATIENT
Start: 2021-08-16 | End: 2022-03-07 | Stop reason: SDUPTHER

## 2021-08-16 RX ORDER — LISINOPRIL 2.5 MG/1
TABLET ORAL
Qty: 30 TABLET | Refills: 5 | Status: SHIPPED | OUTPATIENT
Start: 2021-08-16 | End: 2022-03-21 | Stop reason: SDUPTHER

## 2021-08-17 DIAGNOSIS — E78.2 MIXED HYPERLIPIDEMIA: ICD-10-CM

## 2021-08-17 DIAGNOSIS — I25.10 CORONARY ARTERY DISEASE INVOLVING NATIVE CORONARY ARTERY OF NATIVE HEART WITHOUT ANGINA PECTORIS: ICD-10-CM

## 2021-08-17 DIAGNOSIS — I10 ESSENTIAL HYPERTENSION: ICD-10-CM

## 2021-08-17 NOTE — PROGRESS NOTES
[address]   Electrophysiology  Office Visit  Date: 8/24/2021    Chief Complaint   Patient presents with    Cardiomyopathy    Congestive Heart Failure    Atrial Fibrillation    Coronary Artery Disease       Cardiac HX: Kika Mosqueda is a 74 y.o.  man with a h/o HTN, HLD, NATHAN on CPAP, CAD, s/p PCI (2006), follows with Dr. Glenna Nj, pAF ongoing since 2007, s/p unsuccessful DCCV (3/16/2021), EF dropped to 45%, MPI showed no reversible ischemia and an EF of 37%, s/p LHC w/ PCI x 2, (4/20/21, Dr. Glenna Nj), s/p RFA/PVI of AF/tAFL (5/19/2021, Dr. Frannie Peralta). Interval History/HPI: Patient is here for follow-up for atrial fibrillation, CMP, chronic systolic heart failure, CAD. He recently underwent an RFA/PVI of AF/tAFL (5/2021) with Dr. Frannie Peralta. He reports no breakthrough of AF in the last 2-1/2 months, he did have some breakthrough the week after his ablation. He is on dofetilide, no missed doses. He is on Eliquis, no missed doses, he states he had one episode of pink-tinged urine a month and a half ago, no further episodes. Pt denies palpitations, heart racing, dizziness, lightheadedness. No CP, SOB, PND, orthopnea, BLE edema. BP well controlled. He has lost 8 pounds in the last 2 months, he has changed his diet and wants to lose more weight. He is a former football player, he recently found out he has to have a hip replacement, is looking to do this in October.     Home medications:   Current Outpatient Medications on File Prior to Visit   Medication Sig Dispense Refill    potassium chloride (KLOR-CON M) 20 MEQ extended release tablet TAKE 1 TABLET BY MOUTH  DAILY 90 tablet 3    rosuvastatin (CRESTOR) 20 MG tablet TAKE 1 TABLET BY MOUTH AT  NIGHT 90 tablet 3    dofetilide (TIKOSYN) 500 MCG capsule Take 1 capsule by mouth every 12 hours 180 capsule 3    lisinopril (PRINIVIL;ZESTRIL) 2.5 MG tablet TAKE 1 TABLET BY MOUTH EVERY DAY 30 tablet 5    metoprolol succinate (TOPROL XL) 25 MG extended release tablet Take 1 tablet by mouth daily 90 tablet 3    tadalafil (CIALIS) 20 MG tablet Take 1 tablet by mouth daily as needed for Erectile Dysfunction 30 tablet 3    omeprazole (PRILOSEC) 10 MG delayed release capsule Take 10 mg by mouth daily      furosemide (LASIX) 80 MG tablet Take 1 tablet by mouth daily 90 tablet 3    apixaban (ELIQUIS) 5 MG TABS tablet Take 1 tablet by mouth 2 times daily 180 tablet 3    clotrimazole-betamethasone (LOTRISONE) 1-0.05 % cream APPLY TOPICALLY 2 TIMES DAILY. 45 g 1    TURMERIC PO Take 40 mg by mouth 2 times daily      Specialty Vitamins Products (PROSTATE PO) Take 40 mg by mouth 2 times daily      Cranberry 600 MG TABS Take by mouth 3 times daily      aspirin 81 MG tablet Take 81 mg by mouth daily       tamsulosin (FLOMAX) 0.4 MG capsule Take 0.4 mg by mouth 2 times daily. No current facility-administered medications on file prior to visit.        Past Medical History:   Diagnosis Date    CAD (coronary artery disease)     Hyperlipidemia     Hypertension     S/P colonoscopy sept 2010    BN    Shoulder injury     LEFT & RIGHT /Dr Gera Lucia (football)    Torn meniscus     LT /Dr Gera Lucia (football)        Past Surgical History:   Procedure Laterality Date    BACK SURGERY Bilateral 2014    SCIATIC NERVE     COLONOSCOPY N/A 9/3/2019    COLONOSCOPY WITH BIOPSY performed by Azul Ivy MD at [address] [place] [address]  2006    x2    ENTEROSCOPY N/A 10/1/2019    PUSH ENTEROSCOPY BIOPSY SMALL BOWEL performed by Azul Ivy MD at . [facility] 122 Left 07/2019    RING [employer]INGER - TRIGGER    HAND SURGERY  2005,2010    thumb ,nando trigger    HEMORRHOID SURGERY  2010    KNEE SURGERY  1950,8983,5100    RT x 3 one scope & 2 major    TOTAL KNEE ARTHROPLASTY Right 12/2014    UPPER GASTROINTESTINAL ENDOSCOPY N/A 9/25/2018    EGD BIOPSY GASTRIC FOR H PYLORI performed by Azul Ivy MD at [place] [address] ENDOSCOPY N/A 12/18/2018    EGD BIOPSY performed by Kike Jameson MD at [address] [employer] N/A 9/3/2019    EGD BIOPSY performed by Kike Jameson MD at [facility] ENDOSCOPY       Family History:  Reviewed. family history includes Cancer in his mother; Other in his father. Review of System:    · Constitutional: No fevers, chills. · Eyes: No visual changes or diplopia. No scleral icterus. · ENT: No Headaches. No mouth sores or sore throat. · Cardiovascular: No for chest pain, No for dyspnea on exertion, No for palpitations or No for loss of consciousness. No cough, hemoptysis, No for pleuritic pain, or phlebitis. · Respiratory: No for cough or wheezing. No hematemesis. · Gastrointestinal: No abdominal pain, blood in stools. · Genitourinary: No dysuria, or hematuria. · Musculoskeletal: No gait disturbance,    · Integumentary: No rash or pruritis. · Neurological: No headache, change in muscle strength, numbness or tingling. · Psychiatric: No anxiety, or depression. · Endocrine: No temperature intolerance. No excessive thirst, fluid intake, or urination. · Hem/Lymph: No abnormal bruising or bleeding, blood clots or swollen lymph nodes. · Allergic/Immunologic: No nasal congestion or hives. Physical Examination:  Vitals:    08/24/21 1328   BP: 116/65   Temp: 92 °F (33.3 °C)         Wt Readings from Last 3 Encounters:   08/24/21 279 lb (126.6 kg)   06/30/21 284 lb 9.6 oz (129.1 kg)   05/27/21 287 lb 9.6 oz (130.5 kg)       · Constitutional: Oriented. No distress. · Head: Normocephalic and atraumatic. · Mouth/Throat: Oropharynx is clear and moist.   · Eyes: Conjunctivae clear without jaunduice. PERRL. · Neck: Neck supple. No rigidity. No JVD present. · Cardiovascular: Normal rate, regular rhythm, S1&S2. · Pulmonary/Chest: Bilateral respiratory sounds. No wheezes, No rhonchi. · Abdominal: Soft. Bowel sounds present. No distension, No tenderness. · Musculoskeletal: No tenderness. No edema    · Lymphadenopathy: Has no cervical adenopathy. · Neurological: Alert and oriented. Cranial nerve appears intact, No Gross deficit   · Skin: Skin is warm and dry. No rash noted. · Psychiatric: Has a normal mood, affect and behavior     Labs:  Reviewed. No results for input(s): NA, K, CL, CO2, PHOS, BUN, CREATININE in the last 72 hours. Invalid input(s): CA,  TSH  No results for input(s): WBC, HGB, HCT, MCV, PLT in the last 72 hours. Lab Results   Component Value Date    CKTOTAL 117 07/31/2012    TROPONINI <0.01 02/09/2021     No results found for: BNP  Lab Results   Component Value Date    PROTIME 11.9 05/19/2021    PROTIME 13.0 04/21/2021    PROTIME 14.2 04/15/2021    INR 1.03 05/19/2021    INR 1.12 04/21/2021    INR 1.22 04/15/2021     Lab Results   Component Value Date    CHOL 125 04/23/2021    HDL 45 04/23/2021    TRIG 68 04/23/2021       ECG: Personally reviewed: NSR, HR 94, , , QTc 433    ECHO:  3.16.2021   Summary   Limited study. .   There is mildly increased left ventricular wall thickness. Overall left ventricular systolic function appears mildly reduced with   estimated LVEF of 45%. Mitral valve prolapse is present. Mild to moderate mitral regurgitation. The left atrial size appears dilated. There is no evidence of mass or   thrombus in the left atrium or appendage however there is spontaneous   contrast seen. No intracardiac thrombus was seen on this study. Stress Test: 3.23.2021  Summary   Noreene Shouts is a small fixed perfusion defect at the inferoapical wall of the Left    ventricle consistent with infarction.  There is no perfusion defect to    suggest ischemia.    The LVEF is reduced at 37% with global mild hypokinesis and moderate    hypokinesis at the inferoapical wall.    The TID is normal at 0.99.        This is an intermediate risk cardiac scan.         Cardiac Angiography: N/A    Problem List:   Patient Active Problem List    Diagnosis Date Noted    NSVT (nonsustained ventricular tachycardia) ([facility]     CAD in native artery     A-fib ([facility] 04/20/2021    Persistent atrial fibrillation ([facility] 04/20/2021    [facility]ly [facility] 10/26/2020    Localized edema 11/14/2017    Hypertension 01/30/2013    Other tenosynovitis of hand and wrist 12/12/2012    Hypertrophy of prostate without urinary obstruction and other lower urinary tract symptoms (LUTS) 03/17/2010    Coronary atherosclerosis 10/05/2007    Other and unspecified hyperlipidemia 10/05/2007    Leeanna Shelleymain cyst [facility]) 11/22/2002     Assessment:   1. Longstanding persistent atrial fibrillation ([facility]    2. NICM (nonischemic cardiomyopathy) ([facility]    3. Chronic systolic heart failure ([facility]    4. Coronary artery disease involving native coronary artery of native heart without angina pectoris         Cardiac HX: Jelly Rees is a 74 y.o.  man with a h/o HTN, HLD, NATHAN on CPAP, CAD, s/p PCI (2006), follows with Dr. Dennys Stevens, pAF ongoing since 2007, s/p unsuccessful DCCV (3/16/2021), EF dropped to 45%, MPI showed no reversible ischemia and an EF of 37%, s/p LHC w/ PCI x 2, (4/20/21, Dr. Dennys Stevens), s/p RFA/PVI of AF/tAFL (5/19/2021, Dr. Shukri Meyers). [id number] 2.  TSH 2.37 (2.9.2021)    AF  - In NSR, no known breakthrough of AF per patient  - S/p RFA/PVI of AF/tAFL (5/19/2021, Dr. Shukri Meyers)  - On Eliquis 5mg BID - continue, 1 isolated episode of pink-tinged urine, advised patient if this reoccurs to follow-up with urology  - On dofetilide 500 mcg ,   - On Toprol 25 mg daily  - Will place 2-week Cam to assess AF burden  - Follow-up in 3 months with Dr. Shukri Pen     CMP/ chronic sCHF  - EF 45%  - NYHA I  -   - On lisinopril 2.5 mg daily, Toprol 25 mg daily, Lasix 80 mg daily  - ECG ordered and results personally reviewed     CAD  - No s/sx  - S/p LHC w/ PCI x 2, (4/20/21, Dr. Dennys Stevens)  - On asa, plavix, statin    EF of 98%  ACEi for systolic HF  ASA and Statin for CAD  Anticoagulation for AF    No Tobacco use    All questions and concerns were addressed to the patient/family. Alternatives to my treatment were discussed. The note was completed using EMR. Every effort was made to ensure accuracy; however, inadvertent computerized transcription errors may be present. Patient received education regarding their diagnosis, treatment and medications while in the office today.       Anna Rodriguez, [address] [facility]

## 2021-08-17 NOTE — TELEPHONE ENCOUNTER
Requested Prescriptions     Pending Prescriptions Disp Refills    potassium chloride (KLOR-CON M) 20 MEQ extended release tablet [Pharmacy Med Name: POTASSIUM  20MEQ CONTROLLED RELEASE TABLET  SR CHLORIDE MC TB] 90 tablet 3     Sig: TAKE 1 TABLET BY MOUTH  DAILY    rosuvastatin (CRESTOR) 20 MG tablet [Pharmacy Med Name: ROSUVASTATIN  20MG  TAB] 90 tablet 3     Sig: TAKE 1 TABLET BY MOUTH AT  NIGHT          Number: 90    Refills: 3    Last Office Visit: 5/27/2021     Next Office Visit: 8/24/2021      Last Labs: [phone]

## 2021-08-19 RX ORDER — ROSUVASTATIN CALCIUM 20 MG/1
TABLET, COATED ORAL
Qty: 90 TABLET | Refills: 3 | Status: SHIPPED | OUTPATIENT
Start: 2021-08-19 | End: 2022-07-05

## 2021-08-19 RX ORDER — POTASSIUM CHLORIDE 20 MEQ/1
20 TABLET, EXTENDED RELEASE ORAL DAILY
Qty: 90 TABLET | Refills: 3 | Status: SHIPPED | OUTPATIENT
Start: 2021-08-19 | End: 2022-07-05

## 2021-08-24 ENCOUNTER — OFFICE VISIT (OUTPATIENT)
Dept: CARDIOLOGY CLINIC | Age: 66
End: 2021-08-24
Payer: COMMERCIAL

## 2021-08-24 ENCOUNTER — NURSE ONLY (OUTPATIENT)
Dept: CARDIOLOGY CLINIC | Age: 66
End: 2021-08-24

## 2021-08-24 VITALS
DIASTOLIC BLOOD PRESSURE: 65 MMHG | SYSTOLIC BLOOD PRESSURE: 116 MMHG | BODY MASS INDEX: 36.81 KG/M2 | WEIGHT: 279 LBS | TEMPERATURE: 92 F

## 2021-08-24 DIAGNOSIS — I50.22 CHRONIC SYSTOLIC HEART FAILURE (HCC): ICD-10-CM

## 2021-08-24 DIAGNOSIS — I48.11 LONGSTANDING PERSISTENT ATRIAL FIBRILLATION (HCC): Primary | ICD-10-CM

## 2021-08-24 DIAGNOSIS — I42.8 NICM (NONISCHEMIC CARDIOMYOPATHY) (HCC): ICD-10-CM

## 2021-08-24 DIAGNOSIS — I25.10 CORONARY ARTERY DISEASE INVOLVING NATIVE CORONARY ARTERY OF NATIVE HEART WITHOUT ANGINA PECTORIS: ICD-10-CM

## 2021-08-24 PROCEDURE — 93000 ELECTROCARDIOGRAM COMPLETE: CPT | Performed by: NURSE PRACTITIONER

## 2021-08-24 PROCEDURE — 99214 OFFICE O/P EST MOD 30 MIN: CPT | Performed by: NURSE PRACTITIONER

## 2021-08-24 PROCEDURE — 93246 EXT ECG>7D<15D RECORDING: CPT | Performed by: INTERNAL MEDICINE

## 2021-09-08 RX ORDER — CLOPIDOGREL BISULFATE 75 MG/1
TABLET ORAL
Qty: 90 TABLET | Refills: 3 | Status: SHIPPED | OUTPATIENT
Start: 2021-09-08 | End: 2022-07-26

## 2021-09-13 ENCOUNTER — TELEPHONE (OUTPATIENT)
Dept: CARDIOLOGY CLINIC | Age: 66
End: 2021-09-13

## 2021-09-13 ENCOUNTER — OFFICE VISIT (OUTPATIENT)
Dept: PRIMARY CARE CLINIC | Age: 66
End: 2021-09-13
Payer: COMMERCIAL

## 2021-09-13 VITALS
OXYGEN SATURATION: 98 % | WEIGHT: 275.6 LBS | TEMPERATURE: 98.3 F | HEART RATE: 78 BPM | DIASTOLIC BLOOD PRESSURE: 60 MMHG | BODY MASS INDEX: 36.53 KG/M2 | SYSTOLIC BLOOD PRESSURE: 100 MMHG | HEIGHT: 73 IN

## 2021-09-13 DIAGNOSIS — Z95.5 S/P CORONARY ARTERY STENT PLACEMENT: ICD-10-CM

## 2021-09-13 DIAGNOSIS — Z79.01 CHRONIC ANTICOAGULATION: ICD-10-CM

## 2021-09-13 DIAGNOSIS — Z01.818 PREOPERATIVE CLEARANCE: Primary | ICD-10-CM

## 2021-09-13 DIAGNOSIS — M16.12 PRIMARY OSTEOARTHRITIS OF LEFT HIP: ICD-10-CM

## 2021-09-13 PROCEDURE — 99212 OFFICE O/P EST SF 10 MIN: CPT | Performed by: FAMILY MEDICINE

## 2021-09-13 SDOH — ECONOMIC STABILITY: FOOD INSECURITY: WITHIN THE PAST 12 MONTHS, YOU WORRIED THAT YOUR FOOD WOULD RUN OUT BEFORE YOU GOT MONEY TO BUY MORE.: NEVER TRUE

## 2021-09-13 SDOH — ECONOMIC STABILITY: FOOD INSECURITY: WITHIN THE PAST 12 MONTHS, THE FOOD YOU BOUGHT JUST DIDN'T LAST AND YOU DIDN'T HAVE MONEY TO GET MORE.: NEVER TRUE

## 2021-09-13 ASSESSMENT — ENCOUNTER SYMPTOMS
BACK PAIN: 0
ALLERGIC/IMMUNOLOGIC NEGATIVE: 1
ABDOMINAL PAIN: 0
RESPIRATORY NEGATIVE: 1
EYES NEGATIVE: 1

## 2021-09-13 ASSESSMENT — SOCIAL DETERMINANTS OF HEALTH (SDOH): HOW HARD IS IT FOR YOU TO PAY FOR THE VERY BASICS LIKE FOOD, HOUSING, MEDICAL CARE, AND HEATING?: NOT HARD AT ALL

## 2021-09-13 NOTE — TELEPHONE ENCOUNTER
The patient was cleared for surgery and his cardiac risk assessment is in media. The patient needs to know if he needs to hold his Eliquis and Plavix, is so how many days prior.  [phone]

## 2021-09-13 NOTE — PROGRESS NOTES
SUBJECTIVE:  Patient ID: Gilma Jackson is a 74 y.o. male.   Chief Complaint:  Chief Complaint   Patient presents with    Pre-op Exam     Left Total hup arthroplasty - , Ja Heath M.D. [facility] [place]   -   Tel; [phone] and fax; [phone]       HPI   74year old Male  Left Hip Replacement   Cardiac Clearance done by Dr Chakraborty Sas  Hx Cardiac ablation Dr Awa So June 2021  Pt is S/p Right knee Replacement      Past Medical History:   Diagnosis Date    CAD (coronary artery disease)     Hyperlipidemia     Hypertension     S/P colonoscopy sept 2010    BN    Shoulder injury     LEFT & RIGHT /Dr Oziel Padilla (football)    Torn meniscus     LT /Dr Oziel Padilla (football)     Past Surgical History:   Procedure Laterality Date    BACK SURGERY Bilateral 2014    SCIATIC NERVE     COLONOSCOPY N/A 9/3/2019    COLONOSCOPY WITH BIOPSY performed by Karri Barksdale MD at [address] [place] [address]  2006    x2    ENTEROSCOPY N/A 10/1/2019    PUSH ENTEROSCOPY BIOPSY SMALL BOWEL performed by Karri Barksdale MD at [facility] 122 Left 07/2019    [address] - TRIGGER    HAND SURGERY  2005,2010    thumb ,nando trigger    HEMORRHOID SURGERY  2010    KNEE SURGERY  4556,[id number],5608    RT x 3 one scope & 2 major    TOTAL KNEE ARTHROPLASTY Right 12/2014    UPPER GASTROINTESTINAL ENDOSCOPY N/A 9/25/2018    EGD BIOPSY GASTRIC FOR H PYLORI performed by Karri Barksdale MD at [facility] 27 N/A 12/18/2018    EGD BIOPSY performed by Karri Barksdale MD at [facility] 27 N/A 9/3/2019    EGD BIOPSY performed by Karri [facility]ksdale MD at [facility] ENDOSCOPY     No Known Allergies      Family History   Problem Relation Age of Onset    Cancer Mother         Ovarian ca @69    Other Father         age 79 +respiratory Failure /mine work     Social History     Social History Narrative        New career [employer]    2 girls 34 & 21     Wife FT [employer] [facility] [employer]  ,Retired [employer]one [employer]OME[employer]    No tobacco       Patient Active Problem List   Diagnosis    Coronary atherosclerosis    Other and unspecified hyperlipidemia    Hypertrophy of prostate without urinary obstruction and other lower urinary tract symptoms (LUTS)    Joao Hals [facility]    Other tenosynovitis of hand and wrist    Hypertension    Localized edema    Morbidly obese ([facility]    A-fib ([facility]    Persistent atrial fibrillation ([facility]    NSVT (nonsustained ventricular tachycardia) ([facility]    CAD in native artery     Current Outpatient Medications   Medication Sig Dispense Refill    clopidogrel (PLAVIX) 75 MG tablet TAKE 1 TABLET BY MOUTH  DAILY 90 tablet 3    potassium chloride (KLOR-CON M) 20 MEQ extended release tablet TAKE 1 TABLET BY MOUTH  DAILY 90 tablet 3    rosuvastatin (CRESTOR) 20 MG tablet TAKE 1 TABLET BY MOUTH AT  NIGHT 90 tablet 3    dofetilide (TIKOSYN) 500 MCG capsule Take 1 capsule by mouth every 12 hours 180 capsule 3    lisinopril (PRINIVIL;ZESTRIL) 2.5 MG tablet TAKE 1 TABLET BY MOUTH EVERY DAY 30 tablet 5    metoprolol succinate (TOPROL XL) 25 MG extended release tablet Take 1 tablet by mouth daily 90 tablet 3    tadalafil (CIALIS) 20 MG tablet Take 1 tablet by mouth daily as needed for Erectile Dysfunction 30 tablet 3    omeprazole (PRILOSEC) 10 MG delayed release capsule Take 10 mg by mouth daily      furosemide (LASIX) 80 MG tablet Take 1 tablet by mouth daily 90 tablet 3    apixaban (ELIQUIS) 5 MG TABS tablet Take 1 tablet by mouth 2 times daily 180 tablet 3    clotrimazole-betamethasone (LOTRISONE) 1-0.05 % cream APPLY TOPICALLY 2 TIMES DAILY.  45 g 1    TURMERIC PO Take 40 mg by mouth 2 times daily      Specialty Vitamins Products (PROSTATE PO) Take 40 mg by mouth 2 times daily      Cranberry 600 MG TABS Take by mouth 3 times daily      tamsulosin (FLOMAX) 0.4 MG capsule Take 0.4 mg by mouth 2 times daily.  aspirin 81 MG tablet Take 81 mg by mouth daily  (Patient not taking: Reported on 9/13/2021)       No current facility-administered medications for this visit. Lab Results   Component Value Date    WBC 5.3 05/19/2021    HGB 13.7 05/19/2021    HCT 39.2 (L) 05/19/2021    MCV 90.4 05/19/2021     05/19/2021     Lab Results   Component Value Date    CHOL 125 04/23/2021    CHOL 143 03/10/2021    CHOL 191 02/12/2020     Lab Results   Component Value Date    TRIG 68 04/23/2021    TRIG 66 03/10/2021    TRIG 97 02/12/2020     Lab Results   Component Value Date    HDL 45 04/23/2021    HDL 51 03/10/2021    HDL 60 02/12/2020     Lab Results   Component Value Date    LDLCALC 66 04/23/2021    LDLCALC 79 03/10/2021    LDLCALC 112 (H) 02/12/2020     Lab Results   Component Value Date    LABVLDL 14 04/23/2021    LABVLDL 13 03/10/2021    LABVLDL 19 02/12/2020     Lab Results   Component Value Date    CHOLHDLRATIO 3.0 02/13/2019    CHOLHDLRATIO 2.6 09/07/2017    CHOLHDLRATIO 2.7 01/26/2016       Chemistry        Component Value Date/Time     05/27/2021 1431    K 4.6 05/27/2021 1431    K 4.3 04/24/2021 0449     05/27/2021 1431    CO2 27 05/27/2021 1431    BUN 9 05/27/2021 1431    CREATININE 0.8 05/27/2021 1431        Component Value Date/Time    CALCIUM 8.9 05/27/2021 1431    ALKPHOS 63 03/04/2021 1519    AST 20 03/04/2021 1519    ALT 14 03/04/2021 1519    BILITOT 0.3 03/04/2021 1519            Review of Systems   Constitutional: Negative. Great   HENT: Negative. Eyes: Negative. Respiratory: Negative. CPAP   Cardiovascular: Positive for leg swelling. Negative for chest pain and palpitations. Cardiology   Dr Parada Beat  Hx Ablation 6/2021  Dr Castillo Lout  Swelling Rx lasix  Stent total x 3   Gastrointestinal: Negative for abdominal pain. BM good  Colonoscopy up to date   Endocrine: Negative. Genitourinary: Negative for dysuria, flank pain and frequency. Musculoskeletal: Negative for back pain and neck pain. Right Knee replaced 2016  Now Left hip is due  Use crutches   Skin: Negative for rash. Allergic/Immunologic: Negative. Neurological: Negative. Hematological: Negative. Psychiatric/Behavioral: Negative for confusion, self-injury, sleep disturbance and suicidal ideas. The patient is not nervous/anxious and is not hyperactive. Wife retired   Wife got new carrier Marriage counseling  Daughter  In town  Daughter came back home         OBJECTIVE:  /60 (Site: Right Upper Arm, Position: Sitting, Cuff Size: Medium Adult)   Pulse 78   Temp 98.3 °F (36.8 °C) (Oral)   Ht 6' 1.2\" (1.859 m)   Wt 275 lb 9.6 oz (125 kg)   SpO2 98%   BMI 36.16 kg/m²   Physical Exam  HENT:      Head: Normocephalic. Right Ear: Tympanic membrane normal.      Left Ear: Tympanic membrane normal.   Eyes:      Extraocular Movements: Extraocular movements intact. Pupils: Pupils are equal, round, and reactive to light. Cardiovascular:      Rate and Rhythm: Normal rate and regular rhythm. Heart sounds: Normal heart sounds. Pulmonary:      Effort: Pulmonary effort is normal.      Breath sounds: Normal breath sounds. Musculoskeletal:      Cervical back: Normal range of motion and neck supple. Right lower leg: Edema present. Left lower leg: Edema present. Comments: Crutches  Left Hip replacement is due  S/P RT knee replacement  Mild ankle swelling   Neurological:      Mental Status: He is alert. ASSESSMENT/PLAN:      Diagnosis Orders   1. Preoperative clearance  CBC Auto Differential    Comprehensive Metabolic Panel    Protime-INR    APTT   2. Primary osteoarthritis of left hip     3. S/P coronary artery stent placement     4.  Chronic anticoagulation           OK for Planned surgery Left Hip Replacement by Dr Burgess Moritz  On 9/20/2021  Cardiology Dr Kathe Graham  Clearance done  Surgery explained by surgeon  Pt is aware of possible risk & complications  Rx Plavix & Rx Eliquis will be handle by Cardiology  DVT prophylaxis as per surgeon

## 2021-09-14 ENCOUNTER — TELEPHONE (OUTPATIENT)
Dept: CARDIOLOGY CLINIC | Age: 66
End: 2021-09-14

## 2021-09-14 NOTE — TELEPHONE ENCOUNTER
Spoke to pt about monitor results.  Per WS no AF seen on monitor, continue current meds and f/u with [facility] on 11.30.2021

## 2021-09-15 LAB
APTT: 33.9 SEC (ref 26.2–38.6)
INR BLD: 1.27 (ref 0.88–1.12)
PROTHROMBIN TIME: 14.5 SEC (ref 9.9–12.7)

## 2021-09-16 ENCOUNTER — TELEPHONE (OUTPATIENT)
Dept: PRIMARY CARE CLINIC | Age: 66
End: 2021-09-16

## 2021-09-16 PROCEDURE — 93248 EXT ECG>7D<15D REV&INTERPJ: CPT | Performed by: INTERNAL MEDICINE

## 2021-09-16 NOTE — TELEPHONE ENCOUNTER
Yahaira Isabel needs the labs from yesterday to be reviewed and faxed to them with EKG, pt has surgery on Monday

## 2021-09-17 NOTE — TELEPHONE ENCOUNTER
Yahaira Isabel calling back on status of notes being sent over to them for patient surgery on Monday 9/20/21

## 2021-09-21 DIAGNOSIS — I48.91 ATRIAL FIBRILLATION, UNSPECIFIED TYPE (HCC): ICD-10-CM

## 2021-10-25 ENCOUNTER — OFFICE VISIT (OUTPATIENT)
Dept: PRIMARY CARE CLINIC | Age: 66
End: 2021-10-25
Payer: COMMERCIAL

## 2021-10-25 VITALS
TEMPERATURE: 98.3 F | HEART RATE: 82 BPM | WEIGHT: 272.8 LBS | SYSTOLIC BLOOD PRESSURE: 118 MMHG | HEIGHT: 74 IN | BODY MASS INDEX: 35.01 KG/M2 | OXYGEN SATURATION: 98 % | DIASTOLIC BLOOD PRESSURE: 72 MMHG

## 2021-10-25 DIAGNOSIS — L30.9 DERMATITIS: Primary | ICD-10-CM

## 2021-10-25 DIAGNOSIS — Z23 NEED FOR PNEUMOCOCCAL VACCINATION: ICD-10-CM

## 2021-10-25 DIAGNOSIS — Z23 NEED FOR INFLUENZA VACCINATION: ICD-10-CM

## 2021-10-25 PROCEDURE — 90472 IMMUNIZATION ADMIN EACH ADD: CPT | Performed by: FAMILY MEDICINE

## 2021-10-25 PROCEDURE — 99213 OFFICE O/P EST LOW 20 MIN: CPT | Performed by: FAMILY MEDICINE

## 2021-10-25 PROCEDURE — 90732 PPSV23 VACC 2 YRS+ SUBQ/IM: CPT | Performed by: FAMILY MEDICINE

## 2021-10-25 PROCEDURE — 90471 IMMUNIZATION ADMIN: CPT | Performed by: FAMILY MEDICINE

## 2021-10-25 PROCEDURE — 90694 VACC AIIV4 NO PRSRV 0.5ML IM: CPT | Performed by: FAMILY MEDICINE

## 2021-10-25 RX ORDER — METHYLPREDNISOLONE 4 MG/1
TABLET ORAL
Qty: 1 KIT | Refills: 0 | Status: SHIPPED | OUTPATIENT
Start: 2021-10-25 | End: 2022-10-31

## 2021-10-25 RX ORDER — CLOTRIMAZOLE 1 %
CREAM (GRAM) TOPICAL 2 TIMES DAILY
COMMUNITY
End: 2021-11-30

## 2021-10-25 SDOH — ECONOMIC STABILITY: HOUSING INSECURITY: IN THE LAST 12 MONTHS, HOW MANY PLACES HAVE YOU LIVED?: 1

## 2021-10-25 SDOH — SOCIAL STABILITY: SOCIAL NETWORK: HOW OFTEN DO YOU ATTEND CHURCH OR RELIGIOUS SERVICES?: MORE THAN 4 TIMES PER YEAR

## 2021-10-25 SDOH — ECONOMIC STABILITY: HOUSING INSECURITY
IN THE LAST 12 MONTHS, WAS THERE A TIME WHEN YOU DID NOT HAVE A STEADY PLACE TO SLEEP OR SLEPT IN A SHELTER (INCLUDING NOW)?: NO

## 2021-10-25 SDOH — ECONOMIC STABILITY: TRANSPORTATION INSECURITY
IN THE PAST 12 MONTHS, HAS LACK OF TRANSPORTATION KEPT YOU FROM MEETINGS, WORK, OR FROM GETTING THINGS NEEDED FOR DAILY LIVING?: NO

## 2021-10-25 SDOH — SOCIAL STABILITY: SOCIAL NETWORK: ARE YOU MARRIED, WIDOWED, DIVORCED, SEPARATED, NEVER MARRIED, OR LIVING WITH A PARTNER?: MARRIED

## 2021-10-25 SDOH — HEALTH STABILITY: MENTAL HEALTH
STRESS IS WHEN SOMEONE FEELS TENSE, NERVOUS, ANXIOUS, OR CAN'T SLEEP AT NIGHT BECAUSE THEIR MIND IS TROUBLED. HOW STRESSED ARE YOU?: NOT AT ALL

## 2021-10-25 SDOH — HEALTH STABILITY: PHYSICAL HEALTH: ON AVERAGE, HOW MANY MINUTES DO YOU ENGAGE IN EXERCISE AT THIS LEVEL?: 60 MIN

## 2021-10-25 SDOH — ECONOMIC STABILITY: TRANSPORTATION INSECURITY
IN THE PAST 12 MONTHS, HAS THE LACK OF TRANSPORTATION KEPT YOU FROM MEDICAL APPOINTMENTS OR FROM GETTING MEDICATIONS?: NO

## 2021-10-25 SDOH — HEALTH STABILITY: MENTAL HEALTH: HOW OFTEN DO YOU HAVE A DRINK CONTAINING ALCOHOL?: NEVER

## 2021-10-25 SDOH — SOCIAL STABILITY: SOCIAL NETWORK
IN A TYPICAL WEEK, HOW MANY TIMES DO YOU TALK ON THE PHONE WITH FAMILY, FRIENDS, OR NEIGHBORS?: MORE THAN THREE TIMES A WEEK

## 2021-10-25 SDOH — SOCIAL STABILITY: SOCIAL NETWORK: HOW OFTEN DO YOU ATTENT MEETINGS OF THE CLUB OR ORGANIZATION YOU BELONG TO?: MORE THAN 4 TIMES PER YEAR

## 2021-10-25 SDOH — HEALTH STABILITY: PHYSICAL HEALTH: ON AVERAGE, HOW MANY DAYS PER WEEK DO YOU ENGAGE IN MODERATE TO STRENUOUS EXERCISE (LIKE A BRISK WALK)?: 7 DAYS

## 2021-10-25 SDOH — ECONOMIC STABILITY: FOOD INSECURITY: WITHIN THE PAST 12 MONTHS, YOU WORRIED THAT YOUR FOOD WOULD RUN OUT BEFORE YOU GOT MONEY TO BUY MORE.: NEVER TRUE

## 2021-10-25 SDOH — ECONOMIC STABILITY: INCOME INSECURITY: IN THE LAST 12 MONTHS, WAS THERE A TIME WHEN YOU WERE NOT ABLE TO PAY THE MORTGAGE OR RENT ON TIME?: NO

## 2021-10-25 SDOH — SOCIAL STABILITY: SOCIAL NETWORK
DO YOU BELONG TO ANY CLUBS OR ORGANIZATIONS SUCH AS CHURCH GROUPS UNIONS, FRATERNAL OR ATHLETIC GROUPS, OR SCHOOL GROUPS?: YES

## 2021-10-25 SDOH — ECONOMIC STABILITY: FOOD INSECURITY: WITHIN THE PAST 12 MONTHS, THE FOOD YOU BOUGHT JUST DIDN'T LAST AND YOU DIDN'T HAVE MONEY TO GET MORE.: NEVER TRUE

## 2021-10-25 SDOH — SOCIAL STABILITY: SOCIAL NETWORK: HOW OFTEN DO YOU GET TOGETHER WITH FRIENDS OR RELATIVES?: MORE THAN THREE TIMES A WEEK

## 2021-10-25 ASSESSMENT — ENCOUNTER SYMPTOMS
RESPIRATORY NEGATIVE: 1
GASTROINTESTINAL NEGATIVE: 1
EYES NEGATIVE: 1

## 2021-10-25 NOTE — PROGRESS NOTES
SUBJECTIVE:  Patient ID: Rosalva Quiñones is a 74 y.o. y.o. male     HPI   Rash: Patient complains of rash involving the bilateral abdomen , bilateral groin and bilateral lower leg. Rash started a few days ago. Appearance of rash at onset: Color of lesion(s): pink. Rash has changed over time Initial distribution: genitalia. Discomfort associated with rash: is pruritic. Associated symptoms: none. Denies: fever, cough, congestion, arthralgia, nausea, vomiting, diarrhea, myalgia, irritability, decrease in appetite, decrease in energy level. Patient has not had previous evaluation of rash. Patient has had previous treatment. Response to treatment: helps some, he did use . Patient has not had contacts with similar rash. Patient has not identified precipitant.  Patient has not had new exposures (soaps, lotions, laundry detergents, foods, medications, plants, insects or animals.)  Cannot think of any trigger, he recently had a hip replacement recovering okay going through physical therapy  Past Medical History:   Diagnosis Date    CAD (coronary artery disease)     Hyperlipidemia     Hypertension     S/P colonoscopy sept 2010    BN    Shoulder injury     LEFT & RIGHT /Dr Lisha Awan (football)    Torn meniscus     LT /Dr Lisha Awan (football)      Past Surgical History:   Procedure Laterality Date    BACK SURGERY Bilateral 2014    SCIATIC NERVE     COLONOSCOPY N/A 9/3/2019    COLONOSCOPY WITH BIOPSY performed by Ayanna Dowling MD at [address] [place] [address]   2006    x2    ENTEROSCOPY N/A 10/1/2019    PUSH ENTEROSCOPY BIOPSY SMALL BOWEL performed by [facility]anna Dowling MD at [facility] 122 Left 07/2019    RING FINGER - TRIGGER    HAND SURGERY  2005,2010    thumb ,nando trigger    HEMORRHOID SURGERY  2010    KNEE SURGERY  [id number]    RT x 3 one scope & 2 major    TOTAL KNEE ARTHROPLASTY Right 12/2014    UPPER GASTROINTESTINAL ENDOSCOPY N/A 9/25/2018    EGD BIOPSY GASTRIC FOR H PYLORI performed by Rosy Lucio MD at [address] [place] [address] N/A 12/18/2018    EGD BIOPSY performed by Rosy Lucio MD at [address] [place] [address] N/A 9/3/2019    EGD BIOPSY performed by [facility] Luc[facility] MD at [facility] ENDOSCOPY     Family History   Problem Relation Age of Onset    Cancer Mother         Ovarian ca @69    Other Father         age 79 +respiratory Failure /mine work     Social History     Socioeconomic History    Marital status:      Spouse name: Preeti Hutchison Number of children: 2    Years of education: college    Highest education level: Not on file   Occupational History     Comment: [employer]   Tobacco Use    Smoking status: Never Smoker    Smokeless tobacco: Never Used   Vaping Use    Vaping Use: Never used   Substance and Sexual Activity    Alcohol use: No    Drug use: No    Sexual activity: Yes     Partners: Female     Comment: M 2 girls 28 & 25   Other Topics Concern    Not on file   Social History Narrative        New career [employer]    2 girls 34 & 21     Wife FT [employer]  ,Retired Simone GOMEZ    No tobacco     Social Determinants of Health     Financial Resource Strain: Low Risk     Difficulty of Paying Living Expenses: Not hard at all   Food Insecurity: No Food Insecurity    Worried About [address] in the Last Year: Never true    [facility] in the Last Year: Never true   Transportation Needs: No Transportation Needs    Lack of Transportation (Medical): No    Lack of Transportation (Non-Medical):  No   Physical Activity: Sufficiently Active    Days of Exercise per Week: 7 days    Minutes of Exercise per Session: 60 min   Stress: No Stress Concern Present    Feeling of Stress : Not at all   Social Connections: Socially Integrated    Frequency of Communication with Friends and Family: More than three times a week    Frequency of Social Gatherings with Friends and Family: More than three times a week    Attends [employer] Services: More than 4 times per year    Active Member of Clubs or Organizations: Yes    Attends Club or Organization Meetings: More than 4 times per year    Marital Status:    Intimate Partner Violence:     Fear of Current or Ex-Partner:     Emotionally Abused:     Physically Abused:     Sexually Abused:      Current Outpatient Medications   Medication Sig Dispense Refill    clotrimazole (LOTRIMIN) 1 % cream Apply topically 2 times daily Apply topically 2 times daily.  clopidogrel (PLAVIX) 75 MG tablet TAKE 1 TABLET BY MOUTH  DAILY 90 tablet 3    potassium chloride (KLOR-CON M) 20 MEQ extended release tablet TAKE 1 TABLET BY MOUTH  DAILY 90 tablet 3    rosuvastatin (CRESTOR) 20 MG tablet TAKE 1 TABLET BY MOUTH AT  NIGHT 90 tablet 3    dofetilide (TIKOSYN) 500 MCG capsule Take 1 capsule by mouth every 12 hours 180 capsule 3    lisinopril (PRINIVIL;ZESTRIL) 2.5 MG tablet TAKE 1 TABLET BY MOUTH EVERY DAY 30 tablet 5    metoprolol succinate (TOPROL XL) 25 MG extended release tablet Take 1 tablet by mouth daily 90 tablet 3    tadalafil (CIALIS) 20 MG tablet Take 1 tablet by mouth daily as needed for Erectile Dysfunction 30 tablet 3    omeprazole (PRILOSEC) 10 MG delayed release capsule Take 10 mg by mouth daily      furosemide (LASIX) 80 MG tablet Take 1 tablet by mouth daily 90 tablet 3    apixaban (ELIQUIS) 5 MG TABS tablet Take 1 tablet by mouth 2 times daily 180 tablet 3    clotrimazole-betamethasone (LOTRISONE) 1-0.05 % cream APPLY TOPICALLY 2 TIMES DAILY. 45 g 1    TURMERIC PO Take 40 mg by mouth 2 times daily      Specialty Vitamins Products (PROSTATE PO) Take 40 mg by mouth 2 times daily      Cranberry 600 MG TABS Take by mouth 3 times daily      tamsulosin (FLOMAX) 0.4 MG capsule Take 0.4 mg by mouth 2 times daily. No current facility-administered medications for this visit.      No Known

## 2021-11-08 DIAGNOSIS — R21 RASH: ICD-10-CM

## 2021-11-08 RX ORDER — CLOTRIMAZOLE AND BETAMETHASONE DIPROPIONATE 10; .64 MG/G; MG/G
CREAM TOPICAL
Qty: 45 G | Refills: 1 | Status: SHIPPED | OUTPATIENT
Start: 2021-11-08 | End: 2021-11-10 | Stop reason: SDUPTHER

## 2021-11-08 NOTE — TELEPHONE ENCOUNTER
Patient  called and needs a refill on the following medication         clotrimazole-betamethasone (LOTRISONE) 1-0.05 % cream [[id number]]     Order Details  Dose, Route, Frequency: As Directed   Dispense Quantity: 45 g Refills: 1          Sig: APPLY TOPICALLY 2 TIMES DAILY.      Pharmacy    [facility]/pharmacy #[id number] [phone] - F [phone]   Aramis Zambrano [facility] [address], [facility] [address]   Phone:  [phone]  Fax:  [phone]     Thank you

## 2021-11-08 NOTE — TELEPHONE ENCOUNTER
Medication:   Requested Prescriptions     Pending Prescriptions Disp Refills    clotrimazole-betamethasone (LOTRISONE) 1-0.05 % cream 45 g 1     Sig: APPLY TOPICALLY 2 TIMES DAILY. Last appt: 10/25/2021   Next appt: Visit date not found    Last OARRS: No flowsheet data found.

## 2021-11-10 DIAGNOSIS — R21 RASH: ICD-10-CM

## 2021-11-10 RX ORDER — CLOTRIMAZOLE AND BETAMETHASONE DIPROPIONATE 10; .64 MG/G; MG/G
CREAM TOPICAL
Qty: 45 G | Refills: 1 | Status: SHIPPED | OUTPATIENT
Start: 2021-11-10

## 2021-11-10 NOTE — TELEPHONE ENCOUNTER
Pt. Called and need a refill for     clotrimazole-betamethasone (LOTRISONE) 1-0.05 % cream [[id number]]       Dose, Route, Frequency: As Directed   Dispense Quantity: 45 g Refills: 1          Sig: APPLY TOPICALLY 2 TIMES DAILY.            He wants this medication to be sent to:    [facility]/pharmacy [address], [address] [facility] [phone]      LOV: 10/25/21

## 2021-11-10 NOTE — TELEPHONE ENCOUNTER
Medication:   Requested Prescriptions     Pending Prescriptions Disp Refills    clotrimazole-betamethasone (LOTRISONE) 1-0.05 % cream 45 g 1     Sig: APPLY TOPICALLY 2 TIMES DAILY. Last Filled: 11.8.21 to mail order need local    Last appt: 10/25/2021   Next appt: Visit date not found    Last OARRS: No flowsheet data found.

## 2021-11-16 NOTE — PROGRESS NOTES
[address]   Cardiac Electrophysiology Consultation   Date: 11/30/2021  Reason for Consultation: AF  Consult Requesting Physician: No att. providers found     Chief Complaint:   Chief Complaint   Patient presents with    3 Month Follow-Up      HPI: Chance Peacock is a 74 y.o. male referred by Dr. Millie Flanagan for AF. PMH significant for HTN, HLD, NATHAN on CPAP, CAD s/p stents 2006, and longstanding h/o PAF since 2007. He had increased number of episodes, s/p unsuccessful DCCV (3/16/21). YVROSE prior to DCCV showed decline in EF to 45% (had been 55% in 2017) and \"dilated LA. \"  MPI showed no reversible ischemia and EF of 37%, but due to NSVT from the inferoapical/lateral region, s/p LHC with PCI x2 (4/20/21, Dr. Millie Flanagan), started on dofetilide with chemical conversion to SR. S/p RFA/PVI of AF, additional focal ablation for AF, outside the pulmonary vein in coronary sinus and magalie terminalis, and typical AFL ablation (5/19/21). Monitor (9/2021) showed no AF. Interval History: Today, he is here for 6 mo f/u post ablation, presenting in [facility]. He feels much better since the ablation and is anxious to start exercising again. Denies SOB, palpitations, chest discomfort, eating difficulties, odynophagia, fever or chills since the ablation. He is retired and currently working as a . Atrial Fibrillation:    BMI    :   Body mass index is 37.92 kg/m².     Duration   :   Longstanding    Symptoms   :  Lightheadeness    Previous DCCV :   Unsuccessful 3/16/21    Previous AAD           :   Dofetilide started 4/2021    Beta blocker  :   Toprol    ACE / ARB  :   lisinopril    OAC   :   Eliquis    LVEF    :   45%    Left atrial size :   \"Dilated per YVROSE\" (3.9 cm in 2017)    NATHAN   :   Yes, on CPAP    Past Medical History:   Diagnosis Date    CAD (coronary artery disease)     Hyperlipidemia     Hypertension     S/P colonoscopy sept 2010    BN    Shoulder injury     LEFT & RIGHT /Dr Tami Barkley (football)  Torn meniscus     LT /Dr Arnulfo Lorenzo (football)        Past Surgical History:   Procedure Laterality Date    BACK SURGERY Bilateral 2014    SCIATIC NERVE     COLONOSCOPY N/A 9/3/2019    COLONOSCOPY WITH BIOPSY performed by Rosy Lucio MD at [address]  2006    x2    ENTEROSCOPY N/A 10/1/2019    PUSH ENTEROSCOPY BIOPSY SMALL BOWEL performed by Rosy Lucio MD at [address] Left 07/2019    RING FINGER - TRIGGER    HAND SURGERY  2005,2010    thumb ,nando trigger    HEMORRHOID SURGERY  2010    KNEE SURGERY  7435,[id number],3630    RT x 3 one scope & 2 major    TOTAL KNEE ARTHROPLASTY Right 12/2014    UPPER GASTROINTESTINAL ENDOSCOPY N/A 9/25/2018    EGD BIOPSY GASTRIC FOR H PYLORI performed by Rosy Lucio MD at [address] 12/18/2018    EGD BIOPSY performed by Rosy Lucio MD at [address] 9/3/2019    EGD BIOPSY performed by Rosy Lucio MD at [facility] ENDOSCOPY       Allergies:  No Known Allergies    Medication:   Prior to Admission medications    Medication Sig Start Date End Date Taking? Authorizing Provider   clotrimazole-betamethasone (LOTRISONE) 1-0.05 % cream APPLY TOPICALLY 2 TIMES DAILY.  11/10/21  Yes Valeria De La Rosa MD   methylPREDNISolone (MEDROL, ABRAHAM,) 4 MG tablet Take by mouth per package instruction 10/25/21  Yes Lucas Britton MD   clopidogrel (PLAVIX) 75 MG tablet TAKE 1 TABLET BY MOUTH  DAILY 9/8/21  Yes Ricardo Rogel MD   potassium chloride (KLOR-CON M) 20 MEQ extended release tablet TAKE 1 TABLET BY MOUTH  DAILY 8/19/21  Yes Ricardo Rogel MD   rosuvastatin (CRESTOR) 20 MG tablet TAKE 1 TABLET BY MOUTH AT  NIGHT 8/19/21  Yes Ricardo Rogel MD   dofetilide (TIKOSYN) 500 MCG capsule Take 1 capsule by mouth every 12 hours 8/16/21  Yes Ricardo Rogel MD   lisinopril (PRINIVIL;ZESTRIL) 2.5 MG tablet TAKE 1 TABLET BY MOUTH EVERY DAY 8/16/21 Yes Ricardo Rogel MD   metoprolol succinate (TOPROL XL) 25 MG extended release tablet Take 1 tablet by mouth daily 7/23/21  Yes Ricardo Rogel MD   tadalafil (CIALIS) 20 MG tablet Take 1 tablet by mouth daily as needed for Erectile Dysfunction 6/30/21  Yes Ricardo Rogel MD   omeprazole (PRILOSEC) 10 MG delayed release capsule Take 10 mg by mouth daily   Yes Historical Provider, MD   furosemide (LASIX) 80 MG tablet Take 1 tablet by mouth daily 5/3/21  Yes CORI Steinberg - CNP   apixaban (ELIQUIS) 5 MG TABS tablet Take 1 tablet by mouth 2 times daily 4/25/21  Yes Raissa Olivares MD   TURMERIC PO Take 40 mg by mouth 2 times daily   Yes Historical Provider, MD   Specialty Vitamins Products (PROSTATE PO) Take 40 mg by mouth 2 times daily   Yes Historical Provider, MD   Cranberry 600 MG TABS Take by mouth 3 times daily   Yes Historical Provider, MD   tamsulosin (FLOMAX) 0.4 MG capsule Take 0.4 mg by mouth 2 times daily. Yes Historical Provider, MD       Social History:   reports that he has never smoked. He has never used smokeless tobacco. He reports that he does not drink alcohol and does not use drugs. Family History:  family history includes Cancer in his mother; Other in his father. Reviewed.  Denies family history of sudden cardiac death, arrhythmia, premature CAD    Review of System:    · General ROS: negative for - chills, fever   · Psychological ROS: negative for - anxiety or depression  · Ophthalmic ROS: negative for - eye pain or loss of vision  · ENT ROS: negative for - epistaxis, headaches, nasal discharge, sore throat   · Allergy and Immunology ROS: negative for - hives, nasal congestion   · Hematological and Lymphatic ROS: negative for - bleeding problems, blood clots, bruising or jaundice  · Endocrine ROS: negative for - skin changes, temperature intolerance or unexpected weight changes  · Respiratory ROS: negative for - cough, hemoptysis, pleuritic pain, SOB, sputum changes or wheezing  · Cardiovascular ROS: Per HPI. · Gastrointestinal ROS: negative for - abdominal pain, blood in stools, diarrhea, hematemesis, melena, nausea/vomiting or swallowing difficulty/pain  · Genito-Urinary ROS: negative for - dysuria or incontinence  · Musculoskeletal ROS: negative for - joint swelling or muscle pain  · Neurological ROS: negative for - confusion, dizziness, gait disturbance, headaches, numbness/tingling, seizures, speech problems, tremors, visual changes or weakness  · Dermatological ROS: negative for - rash    Physical Examination:  Vitals:    11/30/21 1031   BP: 130/80   Pulse: 54       · Constitutional: Oriented. No distress. · Head: Normocephalic and atraumatic. · Mouth/Throat: Oropharynx is clear and moist.   · Eyes: Conjunctivae normal. EOM are normal.   · Neck: Normal range of motion. Neck supple. No rigidity. No JVD present. · Cardiovascular: Normal rate, regular rhythm, S1&S2 and intact distal pulses. · Pulmonary/Chest: Bilateral respiratory sounds. No wheezes. No rhonchi. · Abdominal: Soft. Bowel sounds present. No distension, No tenderness. · Musculoskeletal: No tenderness. No edema    · Lymphadenopathy: Has no cervical adenopathy. · Neurological: Alert and oriented. Cranial nerve appears intact, No Gross deficit   · Skin: Skin is warm and dry. No rash noted. · Psychiatric: Has a normal mood, affect and behavior     Labs:  Reviewed. ECG: reviewed, SR, QTc 432 ms. No pathologic Q waves, ventricular pre-excitation, or QT prolongation. Studies:   1. Event monitor (9/2021):  No AF    2. YVROSE 3/16/21:    Summary   Limited study. .   There is mildly increased left ventricular wall thickness. Overall left ventricular systolic function appears mildly reduced with   estimated LVEF of 45%. Mitral valve prolapse is present. Mild to moderate mitral regurgitation. The left atrial size appears dilated.  There is no evidence of mass or   thrombus in the left atrium or appendage however there is spontaneous   contrast seen. No intracardiac thrombus was seen on this study. Echo 11/15/17  EF 55%,  LA 3.9 cm    3. Stress Test 3/23/21:    Summary   Yayo Motta is a small fixed perfusion defect at the inferoapical wall of the Left    ventricle consistent with infarction. There is no perfusion defect to    suggest ischemia.    The LVEF is reduced at 37% with global mild hypokinesis and moderate    hypokinesis at the inferoapical wall.    The TID is normal at 0.99.        This is an intermediate risk cardiac scan. 4. Cath 11/2006 (Dr. Lachelle Gray):  Stents x 2 to LAD     The MCOT, echocardiogram, stress test, and coronary angiography/PCI were reviewed by myself and used for my plan of care. Procedures:  1.  none    Assessment/Plan:  1. Persistent AF, on Eliquis  2. CAD, s/p PCI 2006 and 4/2021, on Plavix and ASA  3.  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 HTN, stable on Toprol and lisinopril  5. HLD, stable on statin   6. NATHAN, on CPAP  7. 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 Probably, he would need more than 1 procedure to keep him in sinus rhythm, in addition to class III medications. This has been discussed in detail with the patient and he verbalized understanding. Follow up in 6 months with EP NP    Thank you for allowing me to participate in the care of Jose M Valencia. All questions and concerns were addressed to the patient/family. Alternatives to my treatment were discussed.      Valerie Cotton RN, am scribing for and

## 2021-11-30 ENCOUNTER — OFFICE VISIT (OUTPATIENT)
Dept: CARDIOLOGY CLINIC | Age: 66
End: 2021-11-30
Payer: COMMERCIAL

## 2021-11-30 VITALS
DIASTOLIC BLOOD PRESSURE: 80 MMHG | BODY MASS INDEX: 38.09 KG/M2 | WEIGHT: 287.4 LBS | HEIGHT: 73 IN | HEART RATE: 54 BPM | SYSTOLIC BLOOD PRESSURE: 130 MMHG

## 2021-11-30 DIAGNOSIS — I48.11 LONGSTANDING PERSISTENT ATRIAL FIBRILLATION (HCC): Primary | ICD-10-CM

## 2021-11-30 DIAGNOSIS — I42.9 CARDIOMYOPATHY, UNSPECIFIED TYPE (HCC): ICD-10-CM

## 2021-11-30 DIAGNOSIS — I10 ESSENTIAL HYPERTENSION: ICD-10-CM

## 2021-11-30 DIAGNOSIS — E78.5 DYSLIPIDEMIA: ICD-10-CM

## 2021-11-30 DIAGNOSIS — I47.29 NONSUSTAINED VENTRICULAR TACHYCARDIA: ICD-10-CM

## 2021-11-30 DIAGNOSIS — I50.22 CHRONIC SYSTOLIC HEART FAILURE (HCC): ICD-10-CM

## 2021-11-30 DIAGNOSIS — G47.33 OSA ON CPAP: ICD-10-CM

## 2021-11-30 DIAGNOSIS — Z99.89 OSA ON CPAP: ICD-10-CM

## 2021-11-30 DIAGNOSIS — I25.10 CORONARY ARTERY DISEASE INVOLVING NATIVE CORONARY ARTERY OF NATIVE HEART WITHOUT ANGINA PECTORIS: ICD-10-CM

## 2021-11-30 PROCEDURE — 99213 OFFICE O/P EST LOW 20 MIN: CPT | Performed by: INTERNAL MEDICINE

## 2021-11-30 PROCEDURE — 93000 ELECTROCARDIOGRAM COMPLETE: CPT | Performed by: INTERNAL MEDICINE

## 2021-11-30 RX ORDER — DOFETILIDE 0.5 MG/1
500 CAPSULE ORAL EVERY 12 HOURS SCHEDULED
Qty: 180 CAPSULE | Refills: 3 | Status: CANCELLED | OUTPATIENT
Start: 2021-11-30

## 2021-12-08 ENCOUNTER — OFFICE VISIT (OUTPATIENT)
Dept: CARDIOLOGY CLINIC | Age: 66
End: 2021-12-08
Payer: COMMERCIAL

## 2021-12-08 VITALS
DIASTOLIC BLOOD PRESSURE: 64 MMHG | WEIGHT: 271 LBS | HEART RATE: 63 BPM | SYSTOLIC BLOOD PRESSURE: 120 MMHG | BODY MASS INDEX: 35.75 KG/M2

## 2021-12-08 DIAGNOSIS — E66.01 SEVERE OBESITY (BMI 35.0-35.9 WITH COMORBIDITY) (HCC): ICD-10-CM

## 2021-12-08 DIAGNOSIS — N52.01 ERECTILE DYSFUNCTION DUE TO ARTERIAL INSUFFICIENCY: ICD-10-CM

## 2021-12-08 DIAGNOSIS — I10 HTN (HYPERTENSION), BENIGN: ICD-10-CM

## 2021-12-08 DIAGNOSIS — I48.0 PAROXYSMAL ATRIAL FIBRILLATION (HCC): ICD-10-CM

## 2021-12-08 DIAGNOSIS — E78.5 HYPERLIPIDEMIA, UNSPECIFIED HYPERLIPIDEMIA TYPE: ICD-10-CM

## 2021-12-08 DIAGNOSIS — I25.10 CORONARY ARTERY DISEASE INVOLVING NATIVE CORONARY ARTERY OF NATIVE HEART WITHOUT ANGINA PECTORIS: Primary | ICD-10-CM

## 2021-12-08 PROCEDURE — 99214 OFFICE O/P EST MOD 30 MIN: CPT | Performed by: INTERNAL MEDICINE

## 2021-12-08 RX ORDER — SILDENAFIL CITRATE 20 MG/1
20 TABLET ORAL DAILY PRN
Qty: 30 TABLET | Refills: 1 | Status: SHIPPED | OUTPATIENT
Start: 2021-12-08 | End: 2022-02-21

## 2021-12-08 RX ORDER — SILDENAFIL CITRATE 20 MG/1
20 TABLET ORAL DAILY PRN
Qty: 15 TABLET | Refills: 1 | Status: SHIPPED | OUTPATIENT
Start: 2021-12-08 | End: 2022-02-21

## 2021-12-08 RX ORDER — SILDENAFIL CITRATE 20 MG/1
20 TABLET ORAL DAILY PRN
Qty: 30 TABLET | Refills: 1 | Status: SHIPPED | OUTPATIENT
Start: 2021-12-08 | End: 2021-12-08 | Stop reason: SDUPTHER

## 2021-12-08 NOTE — PROGRESS NOTES
Subjective:      Patient ID: Mahendra Wiley is a 74 y.o. male. CC:  Follow up CAD htn and new onset AF. HPI:   Mr. Basilio Moura is doing well overall. No chest pain. Wants to get active. Overall he feels well and was started on eliquis. ECG shows SR after R PDA stenting and then AF ablation. History     Social History    Marital Status:      Spouse Name: Teddy Degree     Number of Children: 2    Years of Education: college     Occupational History          Consultating [employer]     Social History Main Topics    Smoking status: Never Smoker     Smokeless tobacco: Never Used    Alcohol Use: No    Drug Use: No    Sexually Active: Yes -- Female partner(s)      M 2 girls 21&15     Other Topics Concern    Not on file     Social History Narrative    No narrative on file        Patient has a family history is not on file. Patient  has a past medical history of Torn meniscus; S/P colonoscopy; and Shoulder injury. Current Outpatient Prescriptions   Medication Sig Dispense Refill    ibuprofen (ADVIL;MOTRIN) 800 MG tablet TAKE 1 TABLET BY MOUTH EVERY 8 HOURS FOR PAIN  90 tablet  0    simvastatin (ZOCOR) 80 MG tablet TAKE 1 TABLET BY MOUTH NIGHTLY  30 tablet  6    atovaquone-proguanil (MALARONE) 250-100 MG per tablet Take 1 tablet by mouth daily. 21 tablet  0    clopidogrel (PLAVIX) 75 MG tablet TAKE 1 TABLET BY MOUTH DAILY. 30 tablet  6    metoprolol (TOPROL-XL) 50 MG XL tablet TAKE 1 TABLET BY MOUTH DAILY. 30 tablet  6    aspirin 81 MG EC tablet Take 81 mg by mouth daily. No current facility-administered medications for this visit. Vitals  Weight: 280 lb (127.007 kg)  Blood Pressure:  104/62  Pulse: 76     Review of Systems   Constitutional: Negative. HENT: Negative. Eyes: Negative. Respiratory: Negative. Cardiovascular: Negative. Gastrointestinal: Negative. Genitourinary: Negative. Musculoskeletal: Negative. Neurological: Negative. Hematological: Negative. Psychiatric/Behavioral: Negative. Same exam as 5/19/21  Objective:   Physical Exam   Nursing note and vitals reviewed. Constitutional: He is oriented to person, place, and time. He appears well-developed and well-nourished. No distress. HENT:   Head: Normocephalic and atraumatic. Mouth/Throat: No oropharyngeal exudate. Eyes: Conjunctivae are normal. Pupils are equal, round, and reactive to light. Right eye exhibits no discharge. Left eye exhibits no discharge. No scleral icterus. Neck: Normal range of motion. Neck supple. No JVD present. No tracheal deviation present. No thyromegaly present. Cardiovascular: irregularly irregular rhythm with normal s1 and S2, intact distal pulses. Exam reveals no gallop and no friction rub. No murmur heard. Pulmonary/Chest: Effort normal. No stridor. No respiratory distress. He has no wheezes. He has no rales. He exhibits no tenderness. Abdominal: Soft. Bowel sounds are normal. He exhibits no distension and no mass. No tenderness. He has no rebound and no guarding. Musculoskeletal: He exhibits LE edema and no tenderness. Lymphadenopathy:     He has no cervical adenopathy. Neurological: He is alert and oriented to person, place, and time. No cranial nerve deficit. Coordination normal.   Skin: Skin is warm and dry. No rash noted. He is not diaphoretic. No erythema. No pallor. Psychiatric: He has a normal mood and affect. His behavior is normal. Judgment and thought content normal.       Assessment:       Diagnosis Orders   1. Coronary artery disease involving native coronary artery of native heart without angina pectoris     2. HTN (hypertension), benign     3. Hyperlipidemia, unspecified hyperlipidemia type     4. Paroxysmal atrial fibrillation (HCC)     5. Severe obesity (BMI 35.0-35.9 with comorbidity) ([address]     6. Erectile dysfunction due to arterial insufficiency             Plan:      Continue current medications. Stable cardiovascular status. CAD: no chest pain after R PDA RADHA x 2 in 4/2021. In 4/22 consider stopping plavix. Continues on eliquis. Echo 11/15/17 was WNL. HTN: covered. Edema:   Continue 80 mg lasix and kcl. Atrial Fibrillation:  Staying in SR after ablation on exam today. NATHAN with CPAP usage. ED:  Try sildenafil 20 mg daily. Continue current medications. Stable cardiovascular status.

## 2021-12-21 ENCOUNTER — HOSPITAL ENCOUNTER (OUTPATIENT)
Dept: NON INVASIVE DIAGNOSTICS | Age: 66
Discharge: HOME | End: 2021-12-21
Payer: COMMERCIAL

## 2021-12-21 DIAGNOSIS — I42.9 CARDIOMYOPATHY, UNSPECIFIED TYPE (HCC): ICD-10-CM

## 2021-12-21 DIAGNOSIS — I48.11 LONGSTANDING PERSISTENT ATRIAL FIBRILLATION (HCC): ICD-10-CM

## 2021-12-21 LAB
LV EF: 53 %
LVEF MODALITY: NORMAL

## 2021-12-21 PROCEDURE — C8929 TTE W OR WO FOL WCON,DOPPLER: HCPCS

## 2022-01-14 ENCOUNTER — VIRTUAL VISIT (OUTPATIENT)
Dept: PRIMARY CARE CLINIC | Age: 67
End: 2022-01-14
Payer: COMMERCIAL

## 2022-01-14 DIAGNOSIS — R19.7 DIARRHEA, UNSPECIFIED TYPE: Primary | ICD-10-CM

## 2022-01-14 PROCEDURE — 99213 OFFICE O/P EST LOW 20 MIN: CPT | Performed by: FAMILY MEDICINE

## 2022-01-14 ASSESSMENT — ENCOUNTER SYMPTOMS
WHEEZING: 0
NAUSEA: 0
VOMITING: 0
DIARRHEA: 1
ABDOMINAL PAIN: 0
SHORTNESS OF BREATH: 0
BLOOD IN STOOL: 0
CONSTIPATION: 0
CHEST TIGHTNESS: 0
TROUBLE SWALLOWING: 0
SORE THROAT: 0
EYES NEGATIVE: 1

## 2022-01-14 NOTE — PROGRESS NOTES
Within this Telehealth Consent, the terms you and yours refer to the person using the Telehealth Service (Service), or in the case of a use of the Service by or on behalf of a minor, you and yours refer to and include (i) the parent or legal guardian who provides consent to the use of the Service by such minor or uses the Service on behalf of such minor, and (ii) the minor for whom consent is being provided or on whose behalf the Service is being utilized. When using Service, you will be consulting with your health care providers via the use of Telehealth.   Telehealth involves the delivery of healthcare services using electronic communications, information technology or other means between a healthcare provider and a patient who are not in the same physical location. Telehealth may be used for diagnosis, treatment, follow-up and/or patient education, and may include, but is not limited to, one or more of the following:    Electronic transmission of medical records, photo images, personal health information or other data between a patient and a healthcare provider    Interactions between a patient and healthcare provider via audio, video and/or data communications    Use of output data from medical devices, sound and video files    Anticipated Benefits   The use of Telehealth by your Provider(s) through the Service may have the following possible benefits:    Making it easier and more efficient for you to access medical care and treatment for the conditions treated by such Provider(s) utilizing the Service    Allowing you to obtain medical care and treatment by Provider(s) at times that are convenient for you    Enabling you to interact with Provider(s) without the necessity of an in-office appointment     Possible Risks   While the use of Telehealth can provide potential benefits for you, there are also potential risks associated with the use of Telehealth.  These risks include, but may not be limited to the following:    Your Provider(s) may not able to provide medical treatment for your particular condition and you may be required to seek alternative healthcare or emergency care services.  The electronic systems or other security protocols or safeguards used in the Service could fail, causing a breach of privacy of your medical or other information.  Given regulatory requirements in certain jurisdictions, your Provider(s) diagnosis and/or treatment options, especially pertaining to certain prescriptions, may be limited. Acceptance   1. You understand that Services will be provided via Telehealth. This process involves the use of HIPAA compliant and secure, real-time audio-visual interfacing with a qualified and appropriately trained provider located at [facility]. 2. You understand that, under no circumstances, will this session be recorded. 3. You understand that the Provider(s) at [facility] and other clinical participants will be party to the information obtained during the Telehealth session in accordance with best medical practices. 4. You understand that the information obtained during the Telehealth session will be used to help determine the most appropriate treatment options. 5. You understand that You have the right to revoke this consent at any point in time. 6. You understand that Telehealth is voluntary, and that continued treatment is not dependent upon consent. 7. You understand that, in the event of non-consent to Telehealth services and/or technical difficulties, you will obtain services as typically provided in the absence of Telehealth technology. 8. You understand that this consent will be kept in Your medical record. 9. No potential benefits from the use of Telehealth or specific results can be guaranteed. Your condition may not be cured or improved and, in some cases, may get worse.    10. There are limitations in the provision of medical care and treatment via Telehealth and the Service and you may not be able to receive diagnosis and/or treatment through the Service for every condition for which you seek diagnosis and/or treatment. 11. There are potential risks to the use of Telehealth, including but not limited to the risks described in this Telehealth Consent. 12. Your Provider(s) have discussed the use of Telehealth and the Service with you, including the benefits and risks of such and you have provided oral consent to your Provider(s) for the use of Telehealth and the Service. 15. You understand that it is your duty to provide your Provider(s) truthful, accurate and complete information, including all relevant information regarding care that you may have received or may be receiving from other healthcare providers outside of the Service. 14. You understand that each of your Provider(s) may determine in his or sole discretion that your condition is not suitable for diagnosis and/or treatment using the Service, and that you may need to seek medical care and treatment a specialist or other healthcare provider, outside of the Service. 15. You understand that you are fully responsible for payment for all services provided by Provider(s) or through use of the Service and that you may not be able to use third-party insurance. 16. You represent that (a) you have read this Telehealth Consent carefully, (b) you understand the risks and benefits of the Service and the use of Telehealth in the medical care and treatment provided to you by Provider(s) using the Service, and (c) you have the legal capacity and authority to provide this consent for yourself and/or the minor for which you are consenting under applicable federal and state laws, including laws relating to the age of [de-identified] and/or parental/guardian consent.    17. You give your informed consent to the use of Telehealth by Provider(s) using the Service under the terms described in the Terms of Service and this Telehealth Consent. The patient was read the following statement and has consented to the visit as of 1/14/22.      The patient has been scheduled for their first telehealth visit on 1/14/22 with Cezar Trujillo MD.

## 2022-01-14 NOTE — PROGRESS NOTES
2022    TELEHEALTH EVALUATION -- Audio/Visual (During KVTJE-85 public health emergency)    HPI:    Beulah Ovens (:  1955) has requested an audio/video evaluation for the following concern(s):    74year old Male  History few bouts of Diarrhea off /on  First Dec 20,2021 stomach bubbling & diarrhea for few time  He took [employer] with good result  2021 Bubbling & Diarrhea x 2 hours Took Pepto Bismo   Bubbling & Diarrhea few times OTC [employer] anti diarrhea  So far no more diarrhea  No N/V/Fever? Chiils/Abdominal pain  Seen by Urology given Rx follow up next month  Cardiology Care  Recent left Hip Replacement 2021    Past Medical History:   Diagnosis Date    CAD (coronary artery disease)     Hyperlipidemia     Hypertension     S/P colonoscopy 2010    BN    Shoulder injury     LEFT & RIGHT /Dr Carlos Luis (football)    Torn meniscus     LT /Dr Carlos Luis (football)     Past Surgical History:   Procedure Laterality Date    BACK SURGERY Bilateral     SCIATIC NERVE     COLONOSCOPY N/A 9/3/2019    COLONOSCOPY WITH BIOPSY performed by Debora Arzate MD at [address] [place] [address]  2006    x2    ENTEROSCOPY N/A 10/1/2019    PUSH ENTEROSCOPY BIOPSY SMALL BOWEL performed by Debora Arzate MD at [facility] Left 2019    RING FINGER - TRIGGER    HAND SURGERY  ,    thumb ,nando trigger    HEMORRHOID SURGERY      KNEE SURGERY  9700,5300,1731    RT x 3 one scope & 2 major    TOTAL KNEE ARTHROPLASTY Right 2014    UPPER GASTROINTESTINAL ENDOSCOPY N/A 2018    EGD BIOPSY GASTRIC FOR H PYLORI performed by Debora Arzate MD at [address] [place] [address] 2018    EGD BIOPSY performed by Debora Arzate MD at [address] [place] [address] N/A 9/3/2019    EGD BIOPSY performed by Debora Arzate MD at [facility] ENDOSCOPY     No Known Allergies    Family History Problem Relation Age of Onset    Cancer Mother         Ovarian ca @69    Other Father         age 79 +respiratory Failure /mine work     Social History     Social History Narrative        New career [employer]    2 girls 34 & 21     Wife FT [employer]  ,Retired [employer]    No tobacco           Review of Systems   Constitutional: Negative for chills, diaphoresis and fever. HENT: Negative for congestion, sore throat and trouble swallowing. Eyes: Negative. Respiratory: Negative for chest tightness, shortness of breath and wheezing. Cardiovascular:        Cardiology care   Gastrointestinal: Positive for diarrhea. Negative for abdominal pain, blood in stool, constipation, nausea and vomiting. OTC    Genitourinary:        Urology visit   Due for f/u visit 1 month   Neurological: Negative for dizziness and headaches. Prior to Visit Medications    Medication Sig Taking? Authorizing Provider   sildenafil (REVATIO) 20 MG tablet Take 1 tablet by mouth daily as needed (prn ED) Yes Navneet Howell MD   sildenafil (REVATIO) 20 MG tablet Take 1 tablet by mouth daily as needed (prn ED) Generic substitution allowed. Yes Navneet Howell MD   clotrimazole-betamethasone (LOTRISONE) 1-0.05 % cream APPLY TOPICALLY 2 TIMES DAILY.  Yes Art Kocher, MD   clopidogrel (PLAVIX) 75 MG tablet TAKE 1 TABLET BY MOUTH  DAILY Yes Navneet Howell MD   potassium chloride (KLOR-CON M) 20 MEQ extended release tablet TAKE 1 TABLET BY MOUTH  DAILY Yes Navneet Howell MD   rosuvastatin (CRESTOR) 20 MG tablet TAKE 1 TABLET BY MOUTH AT  NIGHT Yes Navneet Howell MD   dofetilide (TIKOSYN) 500 MCG capsule Take 1 capsule by mouth every 12 hours Yes Navneet Howell MD   lisinopril (PRINIVIL;ZESTRIL) 2.5 MG tablet TAKE 1 TABLET BY MOUTH EVERY DAY Yes Navneet Howell MD   metoprolol succinate (TOPROL XL) 25 MG extended release tablet Take 1 tablet by mouth daily Yes Navneet Howell MD   omeprazole (PRILOSEC) 10 MG delayed release capsule Take 10 mg by mouth daily Yes Historical Provider, MD   furosemide (LASIX) 80 MG tablet Take 1 tablet by mouth daily Yes CORI Hutchinson - CNP   apixaban (ELIQUIS) 5 MG TABS tablet Take 1 tablet by mouth 2 times daily Yes Corina Grijalva MD   TURMERIC PO Take 40 mg by mouth 2 times daily Yes Historical Provider, MD   Specialty Vitamins Products (PROSTATE PO) Take 40 mg by mouth 2 times daily Yes Historical Provider, MD   Cranberry 600 MG TABS Take by mouth 3 times daily Yes Historical Provider, MD   tamsulosin (FLOMAX) 0.4 MG capsule Take 0.4 mg by mouth 2 times daily. Yes Historical Provider, MD   methylPREDNISolone (MEDROL, ABRAHAM,) 4 MG tablet Take by mouth per package instruction  Patient not taking: Reported on 1/14/2022  Yuki Hendricks MD   tadalafil (CIALIS) 20 MG tablet Take 1 tablet by mouth daily as needed for Erectile Dysfunction  Patient not taking: Reported on 1/14/2022  Dwight Marshall MD       Social History     Tobacco Use    Smoking status: Never Smoker    Smokeless tobacco: Never Used   Vaping Use    Vaping Use: Never used   Substance Use Topics    Alcohol use: No    Drug use: No            PHYSICAL EXAMINATION:  [ INSTRUCTIONS:  \"[x]\" Indicates a positive item  \"[]\" Indicates a negative item  -- DELETE ALL ITEMS NOT EXAMINED]  Vital Signs: (As obtained by patient/caregiver or practitioner observation)    Blood pressure-  Heart rate-    Respiratory rate-    Temperature-  Pulse oximetry-     Constitutional: [x] Appears well-developed and well-nourished [x] No apparent distress      [] Abnormal-   Mental status  [x] Alert and awake  [x] Oriented to person/place/time []Able to follow commands      Eyes:  EOM    [x]  Normal  [] Abnormal-  Sclera  []  Normal  [] Abnormal -         Discharge []  None visible  [] Abnormal -    HENT:   [x] Normocephalic, atraumatic.   [] Abnormal   [] Mouth/Throat: Mucous membranes are moist.     External Ears [x] Normal  [] Abnormal-     Neck: [x] No visualized mass     Pulmonary/Chest: [x] Respiratory effort normal.  [x] No visualized signs of difficulty breathing or respiratory distress        [] Abnormal-      Musculoskeletal:   [] Normal gait with no signs of ataxia         [x] Normal range of motion of neck        [] Abnormal-       Neurological:        [] No Facial Asymmetry (Cranial nerve 7 motor function) (limited exam to video visit)          [] No gaze palsy        [] Abnormal-         Skin:        [x] No significant exanthematous lesions or discoloration noted on facial skin         [] Abnormal-            Psychiatric:       [x] Normal Affect [] No Hallucinations        [] Abnormal-     Other pertinent observable physical exam findings-     ASSESSMENT/PLAN:     Diagnosis Orders   1. Diarrhea, unspecified type  CBC Auto Differential    Comprehensive Metabolic Panel    GI Bacterial Pathogens By PCR     Covid Vaccine & Booster done  Flu shot done  Last Covid test Sept prior to surgery      Nadine Mckeon, was evaluated through a synchronous (real-time) audio-video encounter. The patient (or guardian if applicable) is aware that this is a billable service. Verbal consent to proceed has been obtained within the past 12 months. The visit was conducted pursuant to the emergency declaration under the [address] [place] [address] authority and the [employer] Act. Patient identification was verified, and a caregiver was present when appropriate. The patient was located in a state where the provider was credentialed to provide care. Total time spent on this encounter: 20 min    --Alverto Lynn MD on 1/14/2022 at 10:06 AM    An electronic signature was used to authenticate this note.

## 2022-01-18 DIAGNOSIS — R19.7 DIARRHEA, UNSPECIFIED TYPE: ICD-10-CM

## 2022-01-18 LAB
A/G RATIO: 1.7 (ref 1.1–2.2)
ALBUMIN SERPL-MCNC: 4.3 G/DL (ref 3.4–5)
ALP BLD-CCNC: 91 U/L (ref 40–129)
ALT SERPL-CCNC: 14 U/L (ref 10–40)
ANION GAP SERPL CALCULATED.3IONS-SCNC: 14 MMOL/L (ref 3–16)
AST SERPL-CCNC: 18 U/L (ref 15–37)
BASOPHILS ABSOLUTE: 0 K/UL (ref 0–0.2)
BASOPHILS RELATIVE PERCENT: 0.4 %
BILIRUB SERPL-MCNC: 0.4 MG/DL (ref 0–1)
BUN BLDV-MCNC: 13 MG/DL (ref 7–20)
CALCIUM SERPL-MCNC: 9.2 MG/DL (ref 8.3–10.6)
CHLORIDE BLD-SCNC: 102 MMOL/L (ref 99–110)
CO2: 27 MMOL/L (ref 21–32)
CREAT SERPL-MCNC: 0.9 MG/DL (ref 0.8–1.3)
EOSINOPHILS ABSOLUTE: 0.2 K/UL (ref 0–0.6)
EOSINOPHILS RELATIVE PERCENT: 3.2 %
GFR AFRICAN AMERICAN: >60
GFR NON-AFRICAN AMERICAN: >60
GLUCOSE BLD-MCNC: 101 MG/DL (ref 70–99)
HCT VFR BLD CALC: 40.2 % (ref 40.5–52.5)
HEMOGLOBIN: 13.4 G/DL (ref 13.5–17.5)
LYMPHOCYTES ABSOLUTE: 1.3 K/UL (ref 1–5.1)
LYMPHOCYTES RELATIVE PERCENT: 26.1 %
MCH RBC QN AUTO: 30.1 PG (ref 26–34)
MCHC RBC AUTO-ENTMCNC: 33.3 G/DL (ref 31–36)
MCV RBC AUTO: 90.6 FL (ref 80–100)
MONOCYTES ABSOLUTE: 0.4 K/UL (ref 0–1.3)
MONOCYTES RELATIVE PERCENT: 8.3 %
NEUTROPHILS ABSOLUTE: 3.2 K/UL (ref 1.7–7.7)
NEUTROPHILS RELATIVE PERCENT: 62 %
PDW BLD-RTO: 13.8 % (ref 12.4–15.4)
PLATELET # BLD: 209 K/UL (ref 135–450)
PMV BLD AUTO: 8.6 FL (ref 5–10.5)
POTASSIUM SERPL-SCNC: 4 MMOL/L (ref 3.5–5.1)
RBC # BLD: 4.43 M/UL (ref 4.2–5.9)
SODIUM BLD-SCNC: 143 MMOL/L (ref 136–145)
TOTAL PROTEIN: 6.9 G/DL (ref 6.4–8.2)
WBC # BLD: 5.2 K/UL (ref 4–11)

## 2022-01-25 DIAGNOSIS — R19.7 DIARRHEA, UNSPECIFIED TYPE: ICD-10-CM

## 2022-01-26 LAB — GI BACTERIAL PATHOGENS BY PCR: NORMAL

## 2022-02-04 ENCOUNTER — TELEPHONE (OUTPATIENT)
Dept: CARDIOLOGY CLINIC | Age: 67
End: 2022-02-04

## 2022-02-04 NOTE — TELEPHONE ENCOUNTER
CARDIAC CLEARANCE     What type of procedure are you having? Hip replacement    Which physician is performing your procedure? Dr. Mak Yo    When is your procedure scheduled for? 02/28/2022    Where are you having this procedure? [facility] location    Are you taking Blood Thinners? Eliquis/Plavix   If so what? (Name/dose/frequesncy)     Does the surgeon want you to stop your blood thinner? If so for how long?  Eliquis-3 days, Plavix-7 days      Phone Number and Contact Name for Physicians office: [phone] option 3    Fax number to send information: [phone]

## 2022-02-09 LAB
P2PSA: 22.9 PG/ML
PHI-HYB: 54
PROSTATE SPECIFIC ANTIGEN FREE: 1.26 NG/ML
PROSTATE SPECIFIC ANTIGEN PERCENT FREE: 14.1 %
PSA, TOTAL: 8.94 NG/ML (ref 0–4)

## 2022-02-14 RX ORDER — APIXABAN 5 MG/1
TABLET, FILM COATED ORAL
Qty: 180 TABLET | Refills: 3 | Status: SHIPPED | OUTPATIENT
Start: 2022-02-14

## 2022-02-14 NOTE — TELEPHONE ENCOUNTER
Requested Prescriptions     Pending Prescriptions Disp Refills    ELIQUIS 5 MG TABS tablet [Pharmacy Med Name: Eliquis 5 MG Oral Tablet] 180 tablet 3     Sig: TAKE 1 TABLET BY MOUTH  TWICE DAILY          Last Office Visit: 11/30/2021     Next Office Visit:5/3/22

## 2022-02-21 ENCOUNTER — OFFICE VISIT (OUTPATIENT)
Dept: PRIMARY CARE CLINIC | Age: 67
End: 2022-02-21
Payer: COMMERCIAL

## 2022-02-21 VITALS
HEART RATE: 71 BPM | HEIGHT: 73 IN | TEMPERATURE: 97.2 F | SYSTOLIC BLOOD PRESSURE: 118 MMHG | BODY MASS INDEX: 35.65 KG/M2 | DIASTOLIC BLOOD PRESSURE: 72 MMHG | OXYGEN SATURATION: 96 % | WEIGHT: 269 LBS

## 2022-02-21 DIAGNOSIS — Z95.820 S/P ANGIOPLASTY WITH STENT: ICD-10-CM

## 2022-02-21 DIAGNOSIS — I25.10 CORONARY ARTERY DISEASE INVOLVING NATIVE CORONARY ARTERY OF NATIVE HEART WITHOUT ANGINA PECTORIS: ICD-10-CM

## 2022-02-21 DIAGNOSIS — Z79.01 CHRONIC ANTICOAGULATION: ICD-10-CM

## 2022-02-21 DIAGNOSIS — E78.2 MIXED HYPERLIPIDEMIA: ICD-10-CM

## 2022-02-21 DIAGNOSIS — I48.91 ATRIAL FIBRILLATION, UNSPECIFIED TYPE (HCC): ICD-10-CM

## 2022-02-21 DIAGNOSIS — M16.11 PRIMARY OSTEOARTHRITIS OF RIGHT HIP: Primary | ICD-10-CM

## 2022-02-21 DIAGNOSIS — Z01.818 PREOPERATIVE CLEARANCE: ICD-10-CM

## 2022-02-21 PROCEDURE — 99212 OFFICE O/P EST SF 10 MIN: CPT | Performed by: FAMILY MEDICINE

## 2022-02-21 ASSESSMENT — ENCOUNTER SYMPTOMS
BACK PAIN: 0
APNEA: 1
ABDOMINAL PAIN: 0
EYES NEGATIVE: 1
ROS SKIN COMMENTS: NO DERMATOLOGY
WHEEZING: 0

## 2022-02-21 NOTE — PROGRESS NOTES
SUBJECTIVE:  Patient ID: Meseert Davila is a 74 y.o. male.   Chief Complaint:  Chief Complaint   Patient presents with    Pre-op Exam     Total hip replacement right Andrew Ortiz M.D [facility] 2/28/22        HPI   74year old Male  Dx osteoarthritis Right  S/P Hip replacement Left Sept 2021  Pt was instructed by Cardiology to D/C Eliquis & Rx Plavix 2/24/2022    Past Medical History:   Diagnosis Date    CAD (coronary artery disease)     Hyperlipidemia     Hypertension     S/P colonoscopy sept 2010    BN    Shoulder injury     LEFT & RIGHT /Dr Kenneth Sandra (football)    Torn meniscus     LT /Dr Kenneth Sandra (football)     Past Surgical History:   Procedure Laterality Date    BACK SURGERY Bilateral 2014    SCIATIC NERVE     COLONOSCOPY N/A 09/03/2019    COLONOSCOPY WITH BIOPSY performed by Gage Burns MD at [address] [place] [address]  2006    x2    ENTEROSCOPY N/A 10/01/2019    PUSH ENTEROSCOPY BIOPSY SMALL BOWEL performed by Gage Burns MD at . [facility] 122 Left 07/2019    [address] - TRIGGER    HAND SURGERY  2005,2010    thumb ,nando trigger    HEMORRHOID SURGERY  2010    KNEE SURGERY  0130,0503,9847    RT x 3 one scope & 2 major    TOTAL HIP ARTHROPLASTY Left     Sept 2021    TOTAL KNEE ARTHROPLASTY Right 12/2014    UPPER GASTROINTESTINAL ENDOSCOPY N/A 09/25/2018    EGD BIOPSY GASTRIC FOR H PYLORI performed by Gage Burns MD at [address] [place] [address] N/A 12/18/2018    EGD BIOPSY performed by Gage Burns MD at [address] [place] [address] N/A 09/03/2019    EGD BIOPSY performed by Gage Burns MD at [facility] ENDOSCOPY     No Known Allergies  Family History   Problem Relation Age of Onset    Cancer Mother         Ovarian ca @69    Other Father         age 79 +respiratory Failure /mine work     Social History     Social History Narrative        New career [employer]    2 girls 34 & 21 Wife FT [employer]  ,Retired [employer]    No tobacco       Patient Active Problem List   Diagnosis    Coronary atherosclerosis    Other and unspecified hyperlipidemia    Hypertrophy of prostate without urinary obstruction and other lower urinary tract symptoms (LUTS)    [place]  cyst ([facility]    Other tenosynovitis of hand and wrist    Hypertension    Localized edema    Morbidly obese ([facility]    A-fib ([facility]    Persistent atrial fibrillation ([facility]    NSVT (nonsustained ventricular tachycardia) ([facility]    CAD in native artery     Current Outpatient Medications   Medication Sig Dispense Refill    ELIQUIS 5 MG TABS tablet TAKE 1 TABLET BY MOUTH  TWICE DAILY 180 tablet 3    clotrimazole-betamethasone (LOTRISONE) 1-0.05 % cream APPLY TOPICALLY 2 TIMES DAILY. 45 g 1    methylPREDNISolone (MEDROL, ABRAHAM,) 4 MG tablet Take by mouth per package instruction 1 kit 0    clopidogrel (PLAVIX) 75 MG tablet TAKE 1 TABLET BY MOUTH  DAILY 90 tablet 3    potassium chloride (KLOR-CON M) 20 MEQ extended release tablet TAKE 1 TABLET BY MOUTH  DAILY 90 tablet 3    rosuvastatin (CRESTOR) 20 MG tablet TAKE 1 TABLET BY MOUTH AT  NIGHT 90 tablet 3    dofetilide (TIKOSYN) 500 MCG capsule Take 1 capsule by mouth every 12 hours 180 capsule 3    lisinopril (PRINIVIL;ZESTRIL) 2.5 MG tablet TAKE 1 TABLET BY MOUTH EVERY DAY 30 tablet 5    metoprolol succinate (TOPROL XL) 25 MG extended release tablet Take 1 tablet by mouth daily 90 tablet 3    tadalafil (CIALIS) 20 MG tablet Take 1 tablet by mouth daily as needed for Erectile Dysfunction 30 tablet 3    furosemide (LASIX) 80 MG tablet Take 1 tablet by mouth daily 90 tablet 3    TURMERIC PO Take 40 mg by mouth 2 times daily      Specialty Vitamins Products (PROSTATE PO) Take 40 mg by mouth 2 times daily      Cranberry 600 MG TABS Take by mouth 3 times daily      tamsulosin (FLOMAX) 0.4 MG capsule Take 0.4 mg by mouth 2 times daily.        No current facility-administered medications for this visit. Lab Results   Component Value Date    WBC 5.2 01/18/2022    HGB 13.4 (L) 01/18/2022    HCT 40.2 (L) 01/18/2022    MCV 90.6 01/18/2022     01/18/2022     Lab Results   Component Value Date    CHOL 125 04/23/2021    CHOL 143 03/10/2021    CHOL 191 02/12/2020     Lab Results   Component Value Date    TRIG 68 04/23/2021    TRIG 66 03/10/2021    TRIG 97 02/12/2020     Lab Results   Component Value Date    HDL 45 04/23/2021    HDL 51 03/10/2021    HDL 60 02/12/2020     Lab Results   Component Value Date    LDLCALC 66 04/23/2021    LDLCALC 79 03/10/2021    LDLCALC 112 (H) 02/12/2020     Lab Results   Component Value Date    LABVLDL 14 04/23/2021    LABVLDL 13 03/10/2021    LABVLDL 19 02/12/2020     Lab Results   Component Value Date    CHOLHDLRATIO 3.0 02/13/2019    CHOLHDLRATIO 2.6 09/07/2017    CHOLHDLRATIO 2.7 01/26/2016       Chemistry        Component Value Date/Time     01/18/2022 1154    K 4.0 01/18/2022 1154    K 4.3 04/24/2021 0449     01/18/2022 1154    CO2 27 01/18/2022 1154    BUN 13 01/18/2022 1154    CREATININE 0.9 01/18/2022 1154        Component Value Date/Time    CALCIUM 9.2 01/18/2022 1154    ALKPHOS 91 01/18/2022 1154    AST 18 01/18/2022 1154    ALT 14 01/18/2022 1154    BILITOT 0.4 01/18/2022 1154        Lab Results   Component Value Date    TSH 2.05 07/26/2019     Lab Results   Component Value Date    LABA1C 5.5 09/15/2021     Lab Results   Component Value Date    .2 09/15/2021     Lab Results   Component Value Date    PSA 8.53 (H) 09/15/2021    PSA 7.21 (H) 02/12/2020    PSA 5.93 (H) 02/09/2015         Review of Systems   Constitutional: Negative for chills, fatigue and fever. Active  Good health   HENT: Negative. Eyes: Negative. Respiratory: Positive for apnea. Negative for wheezing. CPAP   Cardiovascular: Positive for leg swelling. Negative for chest pain and palpitations.         Cardiology  Tramuta  + stent  Year 2016  + stent x 2  Year 2021  Cardiac Clearance done   He will d/c Eliquis 2/24 /2022 & Rx Plavix   Gastrointestinal: Negative for abdominal pain. OTC Prilosec   Endocrine: Negative. Genitourinary: Negative for dysuria and flank pain. Urology care   MRI prostate 2/22/2022   Musculoskeletal: Negative for back pain and neck pain. S/P Hip Replacement Left Sept 2021  S/P Knee Replacement Right year 2015   Skin:        No Dermatology   Allergic/Immunologic: Negative for environmental allergies. Neurological: Negative for dizziness and headaches. Hematological:        Rx Eliquis  Rx Plavix   Psychiatric/Behavioral:        Good support system  [place] wife & daughter   [facility]       OBJECTIVE:  /72 (Site: Right Upper Arm, Position: Sitting, Cuff Size: Large Adult)   Pulse 71   Temp 97.2 °F (36.2 °C) (Infrared)   Ht 6' 1\" (1.854 m)   Wt 269 lb (122 kg)   SpO2 96%   BMI 35.49 kg/m²   Physical Exam  HENT:      Head: Normocephalic. Right Ear: Tympanic membrane normal.      Left Ear: Tympanic membrane normal.   Eyes:      Extraocular Movements: Extraocular movements intact. Pupils: Pupils are equal, round, and reactive to light. Cardiovascular:      Heart sounds: Normal heart sounds. Comments: Irregular irregular  Pulmonary:      Effort: Pulmonary effort is normal.      Breath sounds: Normal breath sounds. No wheezing or rhonchi. Musculoskeletal:      Cervical back: Normal range of motion and neck supple. Right lower leg: Edema present. Left lower leg: Edema present. Comments: S/P Knee replacement RT  S/P Hip Replacement Left  Mild peripheral edema   Neurological:      General: No focal deficit present. Mental Status: He is alert and oriented to person, place, and time. Psychiatric:         Mood and Affect: Mood normal.         Behavior: Behavior normal.         Thought Content:  Thought content normal. Judgment: Judgment normal.         ASSESSMENT/PLAN:      Diagnosis Orders   1. Primary osteoarthritis of right hip     2. Preoperative clearance  Culture, MRSA, Screening   3. Atrial fibrillation, unspecified type ([address]     4. Coronary artery disease involving native coronary artery of native heart without angina pectoris     5. S/P angioplasty with stent     6. Mixed hyperlipidemia     7.  Chronic anticoagulation         OK for planned Surgery Total Hip Replacement Right by Dr Ortega Client   Surgery explained to pt by surgeon  Pt is aware of risk & possible complications  DVT prophylaxis as per surgeon  Cardiac Clearance done by Dr Vandana De Guzman   Pt was instructed by Dr Vandana De Guzman D/C Eliquis 3 days prior surgery& D/c Plavix 7 days prior to surgery  Restart Rx Eliquis & Rx Plavix on  post surgery same day   COVID Vaccine x 3  Flu shot done

## 2022-02-23 LAB — MRSA CULTURE ONLY: NORMAL

## 2022-02-25 ENCOUNTER — TELEPHONE (OUTPATIENT)
Dept: CARDIOLOGY CLINIC | Age: 67
End: 2022-02-25

## 2022-02-25 NOTE — TELEPHONE ENCOUNTER
Pt wanted to make bessy and Carlos Alberto Chau aware that since he stop plavix for his procedure he has a bad headache. [phone]

## 2022-03-07 ENCOUNTER — TELEPHONE (OUTPATIENT)
Dept: CARDIOLOGY CLINIC | Age: 67
End: 2022-03-07

## 2022-03-07 RX ORDER — DOFETILIDE 0.5 MG/1
500 CAPSULE ORAL EVERY 12 HOURS SCHEDULED
Qty: 14 CAPSULE | Refills: 0 | Status: SHIPPED | OUTPATIENT
Start: 2022-03-07 | End: 2022-07-11

## 2022-03-07 NOTE — TELEPHONE ENCOUNTER
The patient states his dofetilide medication is not covered by his insurance and it's to expensive to pay out of pocket. The patient would like to know if we have a discount program or a discount card, because he would like to stay on that medication. The patient will be out of medication on Thursday please advise. [phone]. Morales Walsh

## 2022-03-07 NOTE — TELEPHONE ENCOUNTER
[employer] stated that PA was not necessary. [facility] states his 90 day supply should be $25. Sending 7 day supply to local pharmacy since pt states he will be out by Wednesday.

## 2022-03-07 NOTE — TELEPHONE ENCOUNTER
Attempted PA to have insurance cover his dofetilide. Called and spoke to pt, advised him to try [facility] while the PA is processing. Will keep pt updated.

## 2022-03-18 ENCOUNTER — TELEPHONE (OUTPATIENT)
Dept: PRIMARY CARE CLINIC | Age: 67
End: 2022-03-18

## 2022-03-18 NOTE — TELEPHONE ENCOUNTER
Patient contacted on call provider stating wrist and hand are red and having shoulder pain. Requesting anbx. Returned call, no answer. Left VM stating he should be seen at an Urgent care to be evaluated in person.

## 2022-03-21 ENCOUNTER — TELEPHONE (OUTPATIENT)
Dept: CARDIOLOGY CLINIC | Age: 67
End: 2022-03-21

## 2022-03-21 RX ORDER — LISINOPRIL 2.5 MG/1
TABLET ORAL
Qty: 30 TABLET | Refills: 5 | Status: SHIPPED | OUTPATIENT
Start: 2022-03-21 | End: 2022-05-03 | Stop reason: SDUPTHER

## 2022-03-21 NOTE — TELEPHONE ENCOUNTER
[facility] Medication Refills:    lisinopril (PRINIVIL;ZESTRIL) 2.5 MG tablet [[id number]]     Order Details  Dose, Route, Frequency: As Directed   Dispense Quantity: 30 tablet Refills: 5          Sig: TAKE 1 TABLET BY MOUTH EVERY DAY     Pharmacy    [facility]/pharmacy #[id number] - [address] [facility] [phone] - F [phone]   [address] [facility] [address], [place] [address]   Phone:  [phone]  Fax:  [phone]         Last Office Visit: 12/08/21    Next Office Visit: 05/03/22    Last Refill: 08/16/21    Last Labs: 02/23/22

## 2022-05-02 NOTE — PROGRESS NOTES
[address]   Electrophysiology  Office Visit  Date: 5/3/2022    Chief Complaint   Patient presents with    Atrial Fibrillation    Cardiomyopathy    Congestive Heart Failure    Coronary Artery Disease       Cardiac HX: Christie Guadalupe is a 74 y.o.  man with a h/o HTN, HLD, NATHAN on CPAP, CAD, s/p PCI (2006), follows with Dr. Octavio William, pAF ongoing since 2007, s/p unsuccessful DCCV (3/16/2021), EF dropped to 45%, MPI showed no reversible ischemia and an EF of 37%, s/p LHC w/ PCI x 2, (4/20/21, Dr. Octavio William), s/p RFA/PVI of AF/tAFL (5/19/2021, Dr. Brittney Guerrero), Monitor (8/24-9/7/21) showed AVG HR 71 (), no AF    Interval History/HPI: Patient is here for follow-up for atrial fibrillation, CMP, chronic systolic heart failure, CAD. Presents today in NSR, no known breakthrough of AF. States him and his wife have recently started a diet and he has lost almost 40 pounds. He is trying to stay somewhat vegetarian, drinking more water and is now starting to exercise more since he got his hip replaced. No CP, SOB, palpitations, dizziness, lightheadedness. Endorses some chronic BLE edema due to having bilateral hip replacements. Remains on Eliquis, no bleeding noted. Home medications:   Current Outpatient Medications on File Prior to Visit   Medication Sig Dispense Refill    lisinopril (PRINIVIL;ZESTRIL) 2.5 MG tablet TAKE 1 TABLET BY MOUTH EVERY DAY 30 tablet 5    dofetilide (TIKOSYN) 500 MCG capsule Take 1 capsule by mouth every 12 hours 14 capsule 0    ELIQUIS 5 MG TABS tablet TAKE 1 TABLET BY MOUTH  TWICE DAILY 180 tablet 3    clotrimazole-betamethasone (LOTRISONE) 1-0.05 % cream APPLY TOPICALLY 2 TIMES DAILY.  45 g 1    methylPREDNISolone (MEDROL, ABRAHAM,) 4 MG tablet Take by mouth per package instruction 1 kit 0    clopidogrel (PLAVIX) 75 MG tablet TAKE 1 TABLET BY MOUTH  DAILY 90 tablet 3    potassium chloride (KLOR-CON M) 20 MEQ extended release tablet TAKE 1 TABLET BY MOUTH  DAILY 90 tablet 3    rosuvastatin (CRESTOR) 20 MG tablet TAKE 1 TABLET BY MOUTH AT  NIGHT 90 tablet 3    metoprolol succinate (TOPROL XL) 25 MG extended release tablet Take 1 tablet by mouth daily 90 tablet 3    tadalafil (CIALIS) 20 MG tablet Take 1 tablet by mouth daily as needed for Erectile Dysfunction 30 tablet 3    furosemide (LASIX) 80 MG tablet Take 1 tablet by mouth daily 90 tablet 3    TURMERIC PO Take 40 mg by mouth 2 times daily      Specialty Vitamins Products (PROSTATE PO) Take 40 mg by mouth 2 times daily      Cranberry 600 MG TABS Take by mouth 3 times daily      tamsulosin (FLOMAX) 0.4 MG capsule Take 0.4 mg by mouth 2 times daily.  ACETAMINOPHEN EXTRA STRENGTH 500 MG tablet TAKE 2 TABLETS BY MOUTH 3 TIMES A DAY       No current facility-administered medications on file prior to visit.        Past Medical History:   Diagnosis Date    CAD (coronary artery disease)     Hyperlipidemia     Hypertension     S/P colonoscopy sept 2010    BN    Shoulder injury     LEFT & RIGHT /Dr Pedro Montiel (football)    Torn meniscus     LT /Dr Pedro Montiel (football)        Past Surgical History:   Procedure Laterality Date    BACK SURGERY Bilateral 2014    SCIATIC NERVE     COLONOSCOPY N/A 09/03/2019    COLONOSCOPY WITH BIOPSY performed by Yoli Del Rosario MD at [facility]  2006    x2    ENTEROSCOPY N/A 10/01/2019    PUSH ENTEROSCOPY BIOPSY SMALL BOWEL performed by Yoli Del Rosario MD at [facility] Left 07/2019    RING FINGER - TRIGGER    HAND SURGERY  2005,2010    thumb ,nando trigger    HEMORRHOID SURGERY  2010    KNEE SURGERY  [id number],7993    RT x 3 one scope & 2 major    TOTAL HIP ARTHROPLASTY Left     Sept 2021    TOTAL KNEE ARTHROPLASTY Right 12/2014    UPPER GASTROINTESTINAL ENDOSCOPY N/A 09/25/2018    EGD BIOPSY GASTRIC FOR H PYLORI performed by Yoli Del Rosario MD at [address] [place] [address] 12/18/2018    EGD BIOPSY performed by Issac Greenwood MD at [facility] N/A 09/03/2019    EGD BIOPSY performed by Issac Greenwood MD at [facility] ENDOSCOPY       Family History:  Reviewed. family history includes Cancer in his mother; Other in his father. Review of System:    · Constitutional: No fevers, chills. · Eyes: No visual changes or diplopia. No scleral icterus. · ENT: No Headaches. No mouth sores or sore throat. · Cardiovascular: No for chest pain, No for dyspnea on exertion, No for palpitations or No for loss of consciousness. No cough, hemoptysis, No for pleuritic pain, or phlebitis. · Respiratory: No for cough or wheezing. No hematemesis. · Gastrointestinal: No abdominal pain, blood in stools. · Genitourinary: No dysuria, or hematuria. · Musculoskeletal: No gait disturbance,    · Integumentary: No rash or pruritis. · Neurological: No headache, change in muscle strength, numbness or tingling. · Psychiatric: No anxiety, or depression. · Endocrine: No temperature intolerance. No excessive thirst, fluid intake, or urination. · Hem/Lymph: No abnormal bruising or bleeding, blood clots or swollen lymph nodes. · Allergic/Immunologic: No nasal congestion or hives. Physical Examination:  There were no vitals filed for this visit. Wt Readings from Last 3 Encounters:   05/03/22 246 lb (111.6 kg)   02/21/22 269 lb (122 kg)   12/08/21 271 lb (122.9 kg)       · Constitutional: Oriented. No distress. · Head: Normocephalic and atraumatic. · Mouth/Throat: Oropharynx is clear and moist.   · Eyes: Conjunctivae clear without jaunduice. PERRL. · Neck: Neck supple. No rigidity. No JVD present. · Cardiovascular: Normal rate, regular rhythm, S1&S2. · Pulmonary/Chest: Bilateral respiratory sounds. No wheezes, No rhonchi. · Abdominal: Soft. Bowel sounds present. No distension, No tenderness. · Musculoskeletal: No tenderness.  No edema    · Lymphadenopathy: Has no cervical adenopathy. · Neurological: Alert and oriented. Cranial nerve appears intact, No Gross deficit   · Skin: Skin is warm and dry. No rash noted. · Psychiatric: Has a normal mood, affect and behavior     Labs:  Reviewed. No results for input(s): NA, K, CL, CO2, PHOS, BUN, CREATININE, CA in the last 72 hours. Invalid input(s):  TSH  No results for input(s): WBC, HGB, HCT, MCV, PLT in the last 72 hours. Lab Results   Component Value Date    CKTOTAL 117 07/31/2012    TROPONINI <0.01 02/09/2021     No results found for: BNP  Lab Results   Component Value Date    PROTIME 11.9 02/23/2022    PROTIME 14.5 09/15/2021    PROTIME 11.9 05/19/2021    INR 1.05 02/23/2022    INR 1.27 09/15/2021    INR 1.03 05/19/2021     Lab Results   Component Value Date    CHOL 125 04/23/2021    HDL 45 04/23/2021    TRIG 68 04/23/2021       ECG: Personally reviewed: NSR, HR 75, , , QTc 442    ECHO: 12/21/21  Summary   Technically limited study. Definity contrast administered. Left ventricular cavity size is normal with asymmetric hypertrophy of the   basal septum. Overall left ventricular systolic function appears normal with an estimated   ejection fraction of 50-55%. Diastolic filling parameters suggests normal diastolic function. The aortic root is dilated, measuring 4.4 cm. No evidence of significant valvular stenosis or regurgitation     Stress Test: 3.23.2021  Summary   Daylin Cheeks is a small fixed perfusion defect at the inferoapical wall of the Left    ventricle consistent with infarction.  There is no perfusion defect to    suggest ischemia.    The LVEF is reduced at 37% with global mild hypokinesis and moderate    hypokinesis at the inferoapical wall.    The TID is normal at 0.99.        This is an intermediate risk cardiac scan.         Cardiac Angiography: N/A    Problem List:   Patient Active Problem List    Diagnosis Date Noted    NSVT [facility]nonsustained ventricular tachycardia) ([facility] [address] [facility]     CAD in native artery  A-fib ([facility] [address] 04/20/2021    Persistent atrial fibrillation ([facility] [address] 04/20/2021    Morbidly obese ([facility] [address] 10/26/2020    Localized edema 11/14/2017    Hypertension 01/30/2013    Other tenosynovitis of hand and wrist 12/12/2012    Hypertrophy of prostate without urinary obstruction and other lower urinary tract symptoms (LUTS) 03/17/2010    Coronary atherosclerosis 10/05/2007    Other and unspecified hyperlipidemia 10/05/2007    Julissa Hockey cyst [facility]) 11/22/2002     Assessment:   1. Paroxysmal atrial fibrillation (HCC)    2. Nonischemic cardiomyopathy ([facility] [address] [facility]    3. Chronic systolic heart failure ([facility] [address] [facility]    4. Coronary artery disease involving native coronary artery of native heart without angina pectoris    5. On dofetilide therapy         AF  - In NSR, no known breakthrough of AF per patient  - S/p RFA/PVI of AF/tAFL (5/19/2021, Dr. Priscilla Waller)  - CHADSVASc at least 2  - On Eliquis 5mg BID   - On dofetilide 500 mcg ,   - On Toprol 25 mg daily  - Reviewed risk factor modification for AF with patient including HTN/ DM control, NATHAN management, stress reduction, minimal alcohol intake, tobacco cessation, regular exercise, diet  - Follow-up in 6 months    CMP/ chronic sCHF  - EF now 50-55% (12/2021)  - NYHA I  -   - On lisinopril 2.5 mg daily, Toprol 25 mg daily, Lasix 80 mg daily  - Reviewed labs February 2022  - ECG ordered and results personally reviewed     CAD  - No s/sx  - S/p LHC w/ PCI x 2, (4/20/21, Dr. Angie Rouse)  - On asa, plavix, statin    EF of 58-83%  ACEi for systolic HF  ASA and Statin for CAD  Anticoagulation for AF    No Tobacco use    All questions and concerns were addressed to the patient/family. Alternatives to my treatment were discussed. The note was completed using EMR. Every effort was made to ensure accuracy; however, inadvertent computerized transcription errors may be present.      Patient received education regarding their diagnosis, treatment and medications while in the office today.      Jennifer Uriarte, [facility]

## 2022-05-03 ENCOUNTER — OFFICE VISIT (OUTPATIENT)
Dept: CARDIOLOGY CLINIC | Age: 67
End: 2022-05-03
Payer: COMMERCIAL

## 2022-05-03 VITALS
BODY MASS INDEX: 32.6 KG/M2 | SYSTOLIC BLOOD PRESSURE: 124 MMHG | WEIGHT: 246 LBS | HEART RATE: 75 BPM | HEIGHT: 73 IN | DIASTOLIC BLOOD PRESSURE: 72 MMHG

## 2022-05-03 DIAGNOSIS — Z79.899 ON DOFETILIDE THERAPY: ICD-10-CM

## 2022-05-03 DIAGNOSIS — I25.10 CORONARY ARTERY DISEASE INVOLVING NATIVE CORONARY ARTERY OF NATIVE HEART WITHOUT ANGINA PECTORIS: ICD-10-CM

## 2022-05-03 DIAGNOSIS — I48.0 PAROXYSMAL ATRIAL FIBRILLATION (HCC): Primary | ICD-10-CM

## 2022-05-03 DIAGNOSIS — I42.8 NONISCHEMIC CARDIOMYOPATHY (HCC): ICD-10-CM

## 2022-05-03 DIAGNOSIS — I50.22 CHRONIC SYSTOLIC HEART FAILURE (HCC): ICD-10-CM

## 2022-05-03 PROCEDURE — 99214 OFFICE O/P EST MOD 30 MIN: CPT | Performed by: NURSE PRACTITIONER

## 2022-05-03 PROCEDURE — 93000 ELECTROCARDIOGRAM COMPLETE: CPT | Performed by: NURSE PRACTITIONER

## 2022-05-03 RX ORDER — PSEUDOEPHED/ACETAMINOPH/DIPHEN 30MG-500MG
TABLET ORAL
COMMUNITY
Start: 2022-02-25 | End: 2022-10-31

## 2022-05-03 RX ORDER — TADALAFIL 20 MG/1
20 TABLET ORAL DAILY PRN
Qty: 30 TABLET | Refills: 3 | Status: SHIPPED | OUTPATIENT
Start: 2022-05-03

## 2022-05-03 RX ORDER — LISINOPRIL 2.5 MG/1
TABLET ORAL
Qty: 90 TABLET | Refills: 3 | Status: SHIPPED | OUTPATIENT
Start: 2022-05-03

## 2022-05-24 RX ORDER — FUROSEMIDE 80 MG
80 TABLET ORAL DAILY
Qty: 90 TABLET | Refills: 3 | Status: SHIPPED | OUTPATIENT
Start: 2022-05-24

## 2022-05-24 NOTE — TELEPHONE ENCOUNTER
Requested Prescriptions     Pending Prescriptions Disp Refills    furosemide (LASIX) 80 MG tablet 90 tablet 3     Sig: Take 1 tablet by mouth daily                  Last Office Visit: 5/3/2022     Next Office Visit: 11/15/2022         Last Labs: bmp,cbc 2/23/2022

## 2022-06-22 ENCOUNTER — OFFICE VISIT (OUTPATIENT)
Dept: CARDIOLOGY CLINIC | Age: 67
End: 2022-06-22
Payer: COMMERCIAL

## 2022-06-22 VITALS
DIASTOLIC BLOOD PRESSURE: 66 MMHG | WEIGHT: 243 LBS | HEART RATE: 81 BPM | HEIGHT: 73 IN | BODY MASS INDEX: 32.2 KG/M2 | SYSTOLIC BLOOD PRESSURE: 104 MMHG

## 2022-06-22 DIAGNOSIS — E78.5 HYPERLIPIDEMIA, UNSPECIFIED HYPERLIPIDEMIA TYPE: ICD-10-CM

## 2022-06-22 DIAGNOSIS — I25.10 CORONARY ARTERY DISEASE INVOLVING NATIVE CORONARY ARTERY OF NATIVE HEART WITHOUT ANGINA PECTORIS: Primary | ICD-10-CM

## 2022-06-22 DIAGNOSIS — I10 HTN (HYPERTENSION), BENIGN: ICD-10-CM

## 2022-06-22 PROCEDURE — G8427 DOCREV CUR MEDS BY ELIG CLIN: HCPCS | Performed by: INTERNAL MEDICINE

## 2022-06-22 PROCEDURE — 1036F TOBACCO NON-USER: CPT | Performed by: INTERNAL MEDICINE

## 2022-06-22 PROCEDURE — 99214 OFFICE O/P EST MOD 30 MIN: CPT | Performed by: INTERNAL MEDICINE

## 2022-06-22 PROCEDURE — G8417 CALC BMI ABV UP PARAM F/U: HCPCS | Performed by: INTERNAL MEDICINE

## 2022-06-22 PROCEDURE — 3017F COLORECTAL CA SCREEN DOC REV: CPT | Performed by: INTERNAL MEDICINE

## 2022-06-22 PROCEDURE — 1123F ACP DISCUSS/DSCN MKR DOCD: CPT | Performed by: INTERNAL MEDICINE

## 2022-06-22 NOTE — PROGRESS NOTES
Subjective:      Patient ID: Sharlene Urban is a 74 y.o. male. CC:  Follow up CAD htn and new onset AF. HPI:   Mr. Maria Teresa Mcdaniel is doing well overall. No chest pain. Wants to get active. Overall he feels well and was started on eliquis. ECG shows SR after R PDA stenting and then AF ablation. History     Social History    Marital Status:      Spouse Name: Nomi Galvan     Number of Children: 2    Years of Education: college     Occupational History          Consultating org develop     Social History Main Topics    Smoking status: Never Smoker     Smokeless tobacco: Never Used    Alcohol Use: No    Drug Use: No    Sexually Active: Yes -- Female partner(s)      M 2 girls 21&15     Other Topics Concern    Not on file     Social History Narrative    No narrative on file        Patient has a family history is not on file. Patient  has a past medical history of Torn meniscus; S/P colonoscopy; and Shoulder injury. Current Outpatient Prescriptions   Medication Sig Dispense Refill    ibuprofen (ADVIL;MOTRIN) 800 MG tablet TAKE 1 TABLET BY MOUTH EVERY 8 HOURS FOR PAIN  90 tablet  0    simvastatin (ZOCOR) 80 MG tablet TAKE 1 TABLET BY MOUTH NIGHTLY  30 tablet  6    atovaquone-proguanil (MALARONE) 250-100 MG per tablet Take 1 tablet by mouth daily. 21 tablet  0    clopidogrel (PLAVIX) 75 MG tablet TAKE 1 TABLET BY MOUTH DAILY. 30 tablet  6    metoprolol (TOPROL-XL) 50 MG XL tablet TAKE 1 TABLET BY MOUTH DAILY. 30 tablet  6    aspirin 81 MG EC tablet Take 81 mg by mouth daily. No current facility-administered medications for this visit. Vitals  Weight: 280 lb (127.007 kg)  Blood Pressure:  104/62  Pulse: 76     Review of Systems   Constitutional: Negative. HENT: Negative. Eyes: Negative. Respiratory: Negative. Cardiovascular: Negative. Gastrointestinal: Negative. Genitourinary: Negative. Musculoskeletal: Negative. Neurological: Negative. HTN: covered. Edema:   Continue 80 mg lasix and kcl. Atrial Fibrillation:  Staying in SR after ablation on exam today. NATHAN with CPAP usage. ED:  Try sildenafil 20 mg daily. Continue current medications. Stable cardiovascular status. Lost 44 lbs.

## 2022-07-02 DIAGNOSIS — I10 ESSENTIAL HYPERTENSION: ICD-10-CM

## 2022-07-02 DIAGNOSIS — I25.10 CORONARY ARTERY DISEASE INVOLVING NATIVE CORONARY ARTERY OF NATIVE HEART WITHOUT ANGINA PECTORIS: ICD-10-CM

## 2022-07-02 DIAGNOSIS — E78.2 MIXED HYPERLIPIDEMIA: ICD-10-CM

## 2022-07-05 RX ORDER — POTASSIUM CHLORIDE 20 MEQ/1
20 TABLET, EXTENDED RELEASE ORAL DAILY
Qty: 90 TABLET | Refills: 3 | Status: SHIPPED | OUTPATIENT
Start: 2022-07-05

## 2022-07-05 RX ORDER — ROSUVASTATIN CALCIUM 20 MG/1
TABLET, COATED ORAL
Qty: 90 TABLET | Refills: 3 | Status: SHIPPED | OUTPATIENT
Start: 2022-07-05

## 2022-07-05 NOTE — TELEPHONE ENCOUNTER
Requested Prescriptions     Pending Prescriptions Disp Refills    potassium chloride (KLOR-CON M) 20 MEQ extended release tablet [Pharmacy Med Name: Potassium Chloride Krystle ER 20 MEQ Oral Tablet Extended Release] 90 tablet 3     Sig: TAKE 1 TABLET BY MOUTH  DAILY    rosuvastatin (CRESTOR) 20 MG tablet [Pharmacy Med Name: Rosuvastatin Calcium 20 MG Oral Tablet] 90 tablet 3     Sig: TAKE 1 TABLET BY MOUTH AT  NIGHT                Last Office Visit: 11/30/2021     Next Office Visit: 11/15/2022

## 2022-07-11 RX ORDER — DOFETILIDE 0.5 MG/1
500 CAPSULE ORAL EVERY 12 HOURS SCHEDULED
Qty: 180 CAPSULE | Refills: 3 | Status: SHIPPED | OUTPATIENT
Start: 2022-07-11

## 2022-07-11 NOTE — TELEPHONE ENCOUNTER
Requested Prescriptions     Pending Prescriptions Disp Refills    dofetilide (TIKOSYN) 500 MCG capsule [Pharmacy Med Name: DOFETILIDE  500MCG  CAP] 180 capsule 3     Sig: TAKE 1 CAPSULE BY MOUTH  EVERY 12 HOURS          Number: 180    Refills: 3    Last Office Visit: 11/30/2021     Next Office Visit: 11/15/22

## 2022-07-19 NOTE — TELEPHONE ENCOUNTER
Requested Prescriptions     Pending Prescriptions Disp Refills    metoprolol succinate (TOPROL XL) 25 MG extended release tablet [Pharmacy Med Name: Metoprolol Succinate ER 25 MG Oral Tablet Extended Release 24 Hour] 90 tablet 3     Sig: TAKE 1 TABLET BY MOUTH  DAILY          Number: 90    Refills: 3    Last Office Visit: 11/30/2021     Next Office Visit: 11/15/22

## 2022-07-20 RX ORDER — METOPROLOL SUCCINATE 25 MG/1
25 TABLET, EXTENDED RELEASE ORAL DAILY
Qty: 90 TABLET | Refills: 3 | Status: SHIPPED | OUTPATIENT
Start: 2022-07-20

## 2022-07-26 RX ORDER — CLOPIDOGREL BISULFATE 75 MG/1
TABLET ORAL
Qty: 90 TABLET | Refills: 3 | Status: SHIPPED | OUTPATIENT
Start: 2022-07-26

## 2022-10-06 LAB
C DIFF TOXIN/ANTIGEN: NORMAL
HEMOCCULT SP2 STL QL: NORMAL
HEMOCCULT SP3 STL QL: NORMAL
OCCULT BLOOD SCREENING: ABNORMAL
WHITE BLOOD CELLS (WBC), STOOL: NORMAL

## 2022-10-07 LAB
A/G RATIO: 2 (ref 1.1–2.2)
ALBUMIN SERPL-MCNC: 4.3 G/DL (ref 3.4–5)
ALP BLD-CCNC: 76 U/L (ref 40–129)
ALT SERPL-CCNC: 16 U/L (ref 10–40)
ANION GAP SERPL CALCULATED.3IONS-SCNC: 11 MMOL/L (ref 3–16)
AST SERPL-CCNC: 19 U/L (ref 15–37)
BASOPHILS ABSOLUTE: 0 K/UL (ref 0–0.2)
BASOPHILS RELATIVE PERCENT: 0.4 %
BILIRUB SERPL-MCNC: 0.3 MG/DL (ref 0–1)
BUN BLDV-MCNC: 15 MG/DL (ref 7–20)
CALCIUM SERPL-MCNC: 9.1 MG/DL (ref 8.3–10.6)
CHLORIDE BLD-SCNC: 100 MMOL/L (ref 99–110)
CO2: 29 MMOL/L (ref 21–32)
CREAT SERPL-MCNC: 0.8 MG/DL (ref 0.8–1.3)
CRYPTOSPORIDIUM ANTIGEN STOOL: NORMAL
E HISTOLYTICA ANTIGEN STOOL: NORMAL
EOSINOPHILS ABSOLUTE: 0.1 K/UL (ref 0–0.6)
EOSINOPHILS RELATIVE PERCENT: 2.2 %
GFR AFRICAN AMERICAN: >60
GFR NON-AFRICAN AMERICAN: >60
GIARDIA ANTIGEN STOOL: NORMAL
GLUCOSE BLD-MCNC: 56 MG/DL (ref 70–99)
HCT VFR BLD CALC: 37.5 % (ref 40.5–52.5)
HEMOGLOBIN: 12.7 G/DL (ref 13.5–17.5)
LYMPHOCYTES ABSOLUTE: 1.3 K/UL (ref 1–5.1)
LYMPHOCYTES RELATIVE PERCENT: 26 %
MCH RBC QN AUTO: 30.6 PG (ref 26–34)
MCHC RBC AUTO-ENTMCNC: 33.8 G/DL (ref 31–36)
MCV RBC AUTO: 90.6 FL (ref 80–100)
MONOCYTES ABSOLUTE: 0.4 K/UL (ref 0–1.3)
MONOCYTES RELATIVE PERCENT: 8.5 %
NEUTROPHILS ABSOLUTE: 3.1 K/UL (ref 1.7–7.7)
NEUTROPHILS RELATIVE PERCENT: 62.9 %
PDW BLD-RTO: 13.8 % (ref 12.4–15.4)
PLATELET # BLD: 219 K/UL (ref 135–450)
PMV BLD AUTO: 8.2 FL (ref 5–10.5)
POTASSIUM SERPL-SCNC: 4.1 MMOL/L (ref 3.5–5.1)
RBC # BLD: 4.14 M/UL (ref 4.2–5.9)
SODIUM BLD-SCNC: 140 MMOL/L (ref 136–145)
TOTAL PROTEIN: 6.5 G/DL (ref 6.4–8.2)
WBC # BLD: 5 K/UL (ref 4–11)

## 2022-10-19 ENCOUNTER — TELEPHONE (OUTPATIENT)
Dept: PRIMARY CARE CLINIC | Age: 67
End: 2022-10-19

## 2022-10-21 ENCOUNTER — TELEPHONE (OUTPATIENT)
Dept: CARDIOLOGY CLINIC | Age: 67
End: 2022-10-21

## 2022-10-21 NOTE — TELEPHONE ENCOUNTER
Cardiac Risk Assessment  for Procedures and Surgery    What type of procedure are you having: Colonscopy     When is your procedure scheduled for: 11/1/2022    What physician is performing your procedure:Dr. Mukul Hussein    Phone Number: [phone]    Fax number to send the letter:      Cardiologist Dr. Kaveh Barrett     Last Appointment: 6/22/2022    Next Appointment: 12/14/2022    Are you on any blood thinners: If yes what?  Plavix ,     History of Coronary Artery Disease:    Last Stress test: 3/23/2021    Last echo: 12/21/2021    Device type and :

## 2022-10-31 ENCOUNTER — OFFICE VISIT (OUTPATIENT)
Dept: PRIMARY CARE CLINIC | Age: 67
End: 2022-10-31
Payer: COMMERCIAL

## 2022-10-31 ENCOUNTER — ANESTHESIA EVENT (OUTPATIENT)
Dept: ENDOSCOPY | Age: 67
End: 2022-10-31
Payer: COMMERCIAL

## 2022-10-31 VITALS
HEIGHT: 73 IN | OXYGEN SATURATION: 99 % | WEIGHT: 255.6 LBS | DIASTOLIC BLOOD PRESSURE: 78 MMHG | SYSTOLIC BLOOD PRESSURE: 138 MMHG | BODY MASS INDEX: 33.88 KG/M2 | TEMPERATURE: 97.2 F | HEART RATE: 55 BPM

## 2022-10-31 DIAGNOSIS — Z95.820 S/P ANGIOPLASTY WITH STENT: ICD-10-CM

## 2022-10-31 DIAGNOSIS — Z23 NEED FOR INFLUENZA VACCINATION: ICD-10-CM

## 2022-10-31 DIAGNOSIS — I48.0 PAROXYSMAL ATRIAL FIBRILLATION (HCC): ICD-10-CM

## 2022-10-31 DIAGNOSIS — E78.2 MIXED HYPERLIPIDEMIA: ICD-10-CM

## 2022-10-31 DIAGNOSIS — Z00.00 ENCOUNTER FOR SUBSEQUENT ANNUAL WELLNESS VISIT (AWV) IN MEDICARE PATIENT: Primary | ICD-10-CM

## 2022-10-31 DIAGNOSIS — I10 PRIMARY HYPERTENSION: ICD-10-CM

## 2022-10-31 DIAGNOSIS — L73.9 FOLLICULITIS: ICD-10-CM

## 2022-10-31 PROBLEM — E66.01 MORBIDLY OBESE (HCC): Status: RESOLVED | Noted: 2020-10-26 | Resolved: 2022-10-31

## 2022-10-31 PROBLEM — I48.19 PERSISTENT ATRIAL FIBRILLATION (HCC): Status: RESOLVED | Noted: 2021-04-20 | Resolved: 2022-10-31

## 2022-10-31 PROCEDURE — 3078F DIAST BP <80 MM HG: CPT | Performed by: FAMILY MEDICINE

## 2022-10-31 PROCEDURE — 3074F SYST BP LT 130 MM HG: CPT | Performed by: FAMILY MEDICINE

## 2022-10-31 PROCEDURE — 90694 VACC AIIV4 NO PRSRV 0.5ML IM: CPT | Performed by: FAMILY MEDICINE

## 2022-10-31 PROCEDURE — G0439 PPPS, SUBSEQ VISIT: HCPCS | Performed by: FAMILY MEDICINE

## 2022-10-31 PROCEDURE — 1123F ACP DISCUSS/DSCN MKR DOCD: CPT | Performed by: FAMILY MEDICINE

## 2022-10-31 PROCEDURE — 90471 IMMUNIZATION ADMIN: CPT | Performed by: FAMILY MEDICINE

## 2022-10-31 RX ORDER — MUPIROCIN CALCIUM 20 MG/G
CREAM TOPICAL
Qty: 30 G | Refills: 3 | Status: SHIPPED | OUTPATIENT
Start: 2022-10-31 | End: 2022-11-30

## 2022-10-31 SDOH — ECONOMIC STABILITY: FOOD INSECURITY: WITHIN THE PAST 12 MONTHS, THE FOOD YOU BOUGHT JUST DIDN'T LAST AND YOU DIDN'T HAVE MONEY TO GET MORE.: NEVER TRUE

## 2022-10-31 SDOH — ECONOMIC STABILITY: FOOD INSECURITY: WITHIN THE PAST 12 MONTHS, YOU WORRIED THAT YOUR FOOD WOULD RUN OUT BEFORE YOU GOT MONEY TO BUY MORE.: NEVER TRUE

## 2022-10-31 ASSESSMENT — ENCOUNTER SYMPTOMS
WHEEZING: 0
CHEST TIGHTNESS: 0
BACK PAIN: 1
SHORTNESS OF BREATH: 0
BLOOD IN STOOL: 1
EYES NEGATIVE: 1
ALLERGIC/IMMUNOLOGIC NEGATIVE: 1

## 2022-10-31 ASSESSMENT — PATIENT HEALTH QUESTIONNAIRE - PHQ9
SUM OF ALL RESPONSES TO PHQ QUESTIONS 1-9: 0
SUM OF ALL RESPONSES TO PHQ9 QUESTIONS 1 & 2: 0
SUM OF ALL RESPONSES TO PHQ QUESTIONS 1-9: 0
2. FEELING DOWN, DEPRESSED OR HOPELESS: 0
1. LITTLE INTEREST OR PLEASURE IN DOING THINGS: 0
SUM OF ALL RESPONSES TO PHQ QUESTIONS 1-9: 0
SUM OF ALL RESPONSES TO PHQ QUESTIONS 1-9: 0

## 2022-10-31 ASSESSMENT — SOCIAL DETERMINANTS OF HEALTH (SDOH): HOW HARD IS IT FOR YOU TO PAY FOR THE VERY BASICS LIKE FOOD, HOUSING, MEDICAL CARE, AND HEATING?: NOT HARD AT ALL

## 2022-10-31 NOTE — PROGRESS NOTES
SUBJECTIVE:  Patient ID: Felipa Gonzalez is a 69 y.o. male.   Chief Complaint:  Chief Complaint   Patient presents with    Medicare AWV       HPI  69year old   AWV    Past Medical History:   Diagnosis Date    CAD (coronary artery disease)     Hyperlipidemia     Hypertension     S/P colonoscopy sept 2010    BN    Shoulder injury     LEFT & RIGHT /Dr Margoth Angel (football)    Torn meniscus     LT /Dr Margoth Angel (football)     Past Surgical History:   Procedure Laterality Date    BACK SURGERY Bilateral 2014    SCIATIC NERVE     COLONOSCOPY N/A 09/03/2019    COLONOSCOPY WITH BIOPSY performed by Claude George MD at [address]  2006    x2    ENTEROSCOPY N/A 10/01/2019    PUSH ENTEROSCOPY BIOPSY SMALL BOWEL performed by Claude George MD at [address] [address] Left 07/2019    [address] - TRIGGER    HAND SURGERY  2005,2010    thumb ,nando trigger    HEMORRHOID SURGERY  2010    KNEE SURGERY  [id number]    RT x 3 one scope & 2 major    TOTAL HIP ARTHROPLASTY Left     Sept 2021    TOTAL KNEE ARTHROPLASTY Right 12/2014    UPPER GASTROINTESTINAL ENDOSCOPY N/A 09/25/2018    EGD BIOPSY GASTRIC FOR H PYLORI performed by Claude George MD at [facility] N/A 12/18/2018    EGD BIOPSY performed by Claude George MD at [facility] N/A 09/03/2019    EGD BIOPSY performed by Claude George MD at [facility] ENDOSCOPY     No Known Allergies    Family History   Problem Relation Age of Onset    Cancer Mother         Ovarian ca @69    Other Father         age 79 +respiratory Failure /mine work     Social History     Social History Narrative        New career [place]    2 girls 28 & 32     Wife FT [employer] service  ,Retired [employer]    No tobacco         Patient Active Problem List   Diagnosis    Coronary atherosclerosis    Other and unspecified hyperlipidemia    Hypertrophy of prostate without urinary obstruction and other lower urinary tract symptoms (LUTS)    [place] cyst (HCC)    Other tenosynovitis of hand and wrist    Hypertension    Localized edema    A-fib (HCC)    NSVT (nonsustained ventricular tachycardia)    CAD in native artery     Current Outpatient Medications   Medication Sig Dispense Refill    mupirocin (BACTROBAN) 2 % cream Apply 3 times daily. 30 g 3    clopidogrel (PLAVIX) 75 MG tablet TAKE 1 TABLET BY MOUTH  DAILY 90 tablet 3    metoprolol succinate (TOPROL XL) 25 MG extended release tablet TAKE 1 TABLET BY MOUTH  DAILY 90 tablet 3    dofetilide (TIKOSYN) 500 MCG capsule TAKE 1 CAPSULE BY MOUTH  EVERY 12 HOURS 180 capsule 3    potassium chloride (KLOR-CON M) 20 MEQ extended release tablet TAKE 1 TABLET BY MOUTH  DAILY 90 tablet 3    rosuvastatin (CRESTOR) 20 MG tablet TAKE 1 TABLET BY MOUTH AT  NIGHT 90 tablet 3    furosemide (LASIX) 80 MG tablet Take 1 tablet by mouth daily 90 tablet 3    lisinopril (PRINIVIL;ZESTRIL) 2.5 MG tablet TAKE 1 TABLET BY MOUTH EVERY DAY 90 tablet 3    tadalafil (CIALIS) 20 MG tablet Take 1 tablet by mouth daily as needed for Erectile Dysfunction 30 tablet 3    ELIQUIS 5 MG TABS tablet TAKE 1 TABLET BY MOUTH  TWICE DAILY 180 tablet 3    clotrimazole-betamethasone (LOTRISONE) 1-0.05 % cream APPLY TOPICALLY 2 TIMES DAILY. 45 g 1    Specialty Vitamins Products (PROSTATE PO) Take 40 mg by mouth 2 times daily      Cranberry 600 MG TABS Take by mouth 3 times daily      tamsulosin (FLOMAX) 0.4 MG capsule Take 0.4 mg by mouth 2 times daily. No current facility-administered medications for this visit.      Lab Results   Component Value Date    WBC 5.0 10/07/2022    HGB 12.7 (L) 10/07/2022    HCT 37.5 (L) 10/07/2022    MCV 90.6 10/07/2022     10/07/2022     Lab Results   Component Value Date    CHOL 125 04/23/2021    CHOL 143 03/10/2021    CHOL 191 02/12/2020     Lab Results   Component Value Date    TRIG 68 04/23/2021    TRIG 66 03/10/2021    TRIG 97 02/12/2020     Lab Results   Component Value Date    HDL 45 04/23/2021    HDL 51 03/10/2021    HDL 60 02/12/2020     Lab Results   Component Value Date    LDLCALC 66 04/23/2021    LDLCALC 79 03/10/2021    LDLCALC 112 (H) 02/12/2020     Lab Results   Component Value Date    LABVLDL 14 04/23/2021    LABVLDL 13 03/10/2021    LABVLDL 19 02/12/2020     Lab Results   Component Value Date    CHOLHDLRATIO 3.0 02/13/2019    CHOLHDLRATIO 2.6 09/07/2017    CHOLHDLRATIO 2.7 01/26/2016       Chemistry        Component Value Date/Time     10/07/2022 0840    K 4.1 10/07/2022 0840    K 4.3 04/24/2021 0449     10/07/2022 0840    CO2 29 10/07/2022 0840    BUN 15 10/07/2022 0840    CREATININE 0.8 10/07/2022 0840        Component Value Date/Time    CALCIUM 9.1 10/07/2022 0840    ALKPHOS 76 10/07/2022 0840    AST 19 10/07/2022 0840    ALT 16 10/07/2022 0840    BILITOT 0.3 10/07/2022 0840        Lab Results   Component Value Date    TSH 2.05 07/26/2019    TSHREFLEX 2.37 02/09/2021     Hemoglobin A1C   Date Value Ref Range Status   09/15/2021 5.5 See comment % Final     Comment:     Comment:  Diagnosis of Diabetes: > or = 6.5%  Increased risk of diabetes (Prediabetes): 5.7-6.4%  Glycemic Control: Nonpregnant Adults: <7.0%                    Pregnant: <6.0%         Lab Results   Component Value Date    PSA 8.53 (H) 09/15/2021    PSA 7.21 (H) 02/12/2020    PSA 5.93 (H) 02/09/2015         Review of Systems   Constitutional: Negative. HENT: Negative. Eyes: Negative. Respiratory:  Negative for chest tightness, shortness of breath and wheezing. Hx CPAP   D/C due weight loss   Cardiovascular:         Dr Katherine Nicole  Hx ablation  Rx Plavix   Rx Eliquis  Stent x 3   Gastrointestinal:  Positive for blood in stool. Colonoscopy tomorrow   Dr Allie Vera  He did stop Plavix & Eliquis   Endocrine: Negative. Genitourinary:         [facility]  High PSA  +Calculus   Musculoskeletal:  Positive for back pain.         Hip Replacement bilateral  Rt Knee replacement   Hx Back surgery  Due for Left knee Jan 2023  Shoulder surgery  bilateral   Skin:         Pocket + pus Left outer thigh    Allergic/Immunologic: Negative. Neurological:  Negative for dizziness and headaches. Psychiatric/Behavioral: Negative. Negative for decreased concentration, self-injury, sleep disturbance and suicidal ideas. The patient is not nervous/anxious and is not hyperactive. OBJECTIVE:  /78 (Site: Right Upper Arm, Position: Sitting, Cuff Size: Medium Adult)   Pulse 55   Temp 97.2 °F (36.2 °C) (Infrared)   Ht 6' 1\" (1.854 m)   Wt 255 lb 9.6 oz (115.9 kg)   SpO2 99%   BMI 33.72 kg/m²   Physical Exam  Constitutional:       Comments: BMI 33   HENT:      Head: Normocephalic. Eyes:      Extraocular Movements: Extraocular movements intact. Pupils: Pupils are equal, round, and reactive to light. Cardiovascular:      Rate and Rhythm: Normal rate and regular rhythm. Pulses: Normal pulses. Heart sounds: Normal heart sounds. Pulmonary:      Effort: Pulmonary effort is normal.      Breath sounds: Normal breath sounds. No wheezing or rhonchi. Musculoskeletal:      Cervical back: Normal range of motion and neck supple. Right lower leg: Edema present. Left lower leg: Edema present. Comments: S/p Hip replacement Bilateral  S/P Knee Replacement Right  Mild ankle edema   Skin:     General: Skin is warm. Neurological:      General: No focal deficit present. Mental Status: He is alert and oriented to person, place, and time. Psychiatric:         Mood and Affect: Mood normal.         Behavior: Behavior normal.         Thought Content: Thought content normal.         Judgment: Judgment normal.       ASSESSMENT/PLAN:       ICD-10-CM    1. Encounter for subsequent annual wellness visit (AWV) in Medicare patient  Z00.00       2.  Need for influenza vaccination  Z23 Influenza, FLUAD, (age 72 y+), IM, Preservative Free, 0.5 mL      3. Folliculitis  J97.9 mupirocin (BACTROBAN) 2 % cream      4. S/P angioplasty with stent  Z95.820       5. Paroxysmal atrial fibrillation (HCC)  I48.0       6. Mixed hyperlipidemia  E78.2       7. Primary hypertension  I10       8. BMI 33.0-33.9,adult  Z68.33           Covid booster x 3 He will get @Pharmacy 11/4/2022  Flu shot today  Dx A. Fib ollowed by Cardiology Dr Sonya Garcia  Hx Ablation  Colonoscopy due 11/1/2022 GI Dr Sharyle Dane

## 2022-11-01 ENCOUNTER — HOSPITAL ENCOUNTER (OUTPATIENT)
Age: 67
Setting detail: OUTPATIENT SURGERY
Discharge: HOME | End: 2022-11-01
Attending: INTERNAL MEDICINE | Admitting: INTERNAL MEDICINE
Payer: COMMERCIAL

## 2022-11-01 ENCOUNTER — ANESTHESIA (OUTPATIENT)
Dept: ENDOSCOPY | Age: 67
End: 2022-11-01
Payer: COMMERCIAL

## 2022-11-01 VITALS
TEMPERATURE: 97.7 F | DIASTOLIC BLOOD PRESSURE: 71 MMHG | BODY MASS INDEX: 33.8 KG/M2 | OXYGEN SATURATION: 100 % | HEIGHT: 73 IN | WEIGHT: 255 LBS | SYSTOLIC BLOOD PRESSURE: 125 MMHG | RESPIRATION RATE: 18 BRPM | HEART RATE: 55 BPM

## 2022-11-01 DIAGNOSIS — Z12.11 COLON CANCER SCREENING: ICD-10-CM

## 2022-11-01 DIAGNOSIS — D64.9 ANEMIA, UNSPECIFIED TYPE: ICD-10-CM

## 2022-11-01 PROCEDURE — 2580000003 HC RX 258: Performed by: ANESTHESIOLOGY

## 2022-11-01 PROCEDURE — 2720000010 HC SURG SUPPLY STERILE: Performed by: INTERNAL MEDICINE

## 2022-11-01 PROCEDURE — 3609012400 HC EGD TRANSORAL BIOPSY SINGLE/MULTIPLE: Performed by: INTERNAL MEDICINE

## 2022-11-01 PROCEDURE — 6360000002 HC RX W HCPCS: Performed by: NURSE ANESTHETIST, CERTIFIED REGISTERED

## 2022-11-01 PROCEDURE — 2709999900 HC NON-CHARGEABLE SUPPLY: Performed by: INTERNAL MEDICINE

## 2022-11-01 PROCEDURE — 3609010200 HC COLONOSCOPY ABLATION TUMOR POLYP/OTHER LES: Performed by: INTERNAL MEDICINE

## 2022-11-01 PROCEDURE — 2580000003 HC RX 258: Performed by: NURSE ANESTHETIST, CERTIFIED REGISTERED

## 2022-11-01 PROCEDURE — 3700000001 HC ADD 15 MINUTES (ANESTHESIA): Performed by: INTERNAL MEDICINE

## 2022-11-01 PROCEDURE — 7100000011 HC PHASE II RECOVERY - ADDTL 15 MIN: Performed by: INTERNAL MEDICINE

## 2022-11-01 PROCEDURE — 3700000000 HC ANESTHESIA ATTENDED CARE: Performed by: INTERNAL MEDICINE

## 2022-11-01 PROCEDURE — 7100000010 HC PHASE II RECOVERY - FIRST 15 MIN: Performed by: INTERNAL MEDICINE

## 2022-11-01 PROCEDURE — 88305 TISSUE EXAM BY PATHOLOGIST: CPT

## 2022-11-01 PROCEDURE — 2500000003 HC RX 250 WO HCPCS: Performed by: NURSE ANESTHETIST, CERTIFIED REGISTERED

## 2022-11-01 RX ORDER — SODIUM CHLORIDE, SODIUM LACTATE, POTASSIUM CHLORIDE, CALCIUM CHLORIDE 600; 310; 30; 20 MG/100ML; MG/100ML; MG/100ML; MG/100ML
INJECTION, SOLUTION INTRAVENOUS CONTINUOUS
Status: DISCONTINUED | OUTPATIENT
Start: 2022-11-01 | End: 2022-11-01 | Stop reason: HOSPADM

## 2022-11-01 RX ORDER — MIDAZOLAM HYDROCHLORIDE 1 MG/ML
INJECTION INTRAMUSCULAR; INTRAVENOUS PRN
Status: DISCONTINUED | OUTPATIENT
Start: 2022-11-01 | End: 2022-11-01 | Stop reason: SDUPTHER

## 2022-11-01 RX ORDER — PROPOFOL 10 MG/ML
INJECTION, EMULSION INTRAVENOUS PRN
Status: DISCONTINUED | OUTPATIENT
Start: 2022-11-01 | End: 2022-11-01 | Stop reason: SDUPTHER

## 2022-11-01 RX ORDER — PROPOFOL 10 MG/ML
INJECTION, EMULSION INTRAVENOUS CONTINUOUS PRN
Status: DISCONTINUED | OUTPATIENT
Start: 2022-11-01 | End: 2022-11-01 | Stop reason: SDUPTHER

## 2022-11-01 RX ORDER — SODIUM CHLORIDE, SODIUM LACTATE, POTASSIUM CHLORIDE, CALCIUM CHLORIDE 600; 310; 30; 20 MG/100ML; MG/100ML; MG/100ML; MG/100ML
INJECTION, SOLUTION INTRAVENOUS CONTINUOUS PRN
Status: DISCONTINUED | OUTPATIENT
Start: 2022-11-01 | End: 2022-11-01 | Stop reason: SDUPTHER

## 2022-11-01 RX ORDER — LIDOCAINE HYDROCHLORIDE 20 MG/ML
INJECTION, SOLUTION INFILTRATION; PERINEURAL PRN
Status: DISCONTINUED | OUTPATIENT
Start: 2022-11-01 | End: 2022-11-01 | Stop reason: SDUPTHER

## 2022-11-01 RX ORDER — SODIUM CHLORIDE 0.9 % (FLUSH) 0.9 %
5-40 SYRINGE (ML) INJECTION EVERY 12 HOURS SCHEDULED
Status: DISCONTINUED | OUTPATIENT
Start: 2022-11-01 | End: 2022-11-01 | Stop reason: HOSPADM

## 2022-11-01 RX ORDER — SODIUM CHLORIDE 9 MG/ML
INJECTION, SOLUTION INTRAVENOUS PRN
Status: DISCONTINUED | OUTPATIENT
Start: 2022-11-01 | End: 2022-11-01 | Stop reason: HOSPADM

## 2022-11-01 RX ORDER — SODIUM CHLORIDE 0.9 % (FLUSH) 0.9 %
5-40 SYRINGE (ML) INJECTION PRN
Status: DISCONTINUED | OUTPATIENT
Start: 2022-11-01 | End: 2022-11-01 | Stop reason: HOSPADM

## 2022-11-01 RX ADMIN — MIDAZOLAM HYDROCHLORIDE 2 MG: 2 INJECTION, SOLUTION INTRAMUSCULAR; INTRAVENOUS at 10:05

## 2022-11-01 RX ADMIN — PROPOFOL 100 MG: 10 INJECTION, EMULSION INTRAVENOUS at 09:44

## 2022-11-01 RX ADMIN — SODIUM CHLORIDE, SODIUM LACTATE, POTASSIUM CHLORIDE, AND CALCIUM CHLORIDE: .6; .31; .03; .02 INJECTION, SOLUTION INTRAVENOUS at 09:36

## 2022-11-01 RX ADMIN — LIDOCAINE HYDROCHLORIDE 100 MG: 20 INJECTION, SOLUTION INFILTRATION; PERINEURAL at 09:43

## 2022-11-01 RX ADMIN — SODIUM CHLORIDE, POTASSIUM CHLORIDE, SODIUM LACTATE AND CALCIUM CHLORIDE: 600; 310; 30; 20 INJECTION, SOLUTION INTRAVENOUS at 08:53

## 2022-11-01 RX ADMIN — PROPOFOL 150 MCG/KG/MIN: 10 INJECTION, EMULSION INTRAVENOUS at 09:44

## 2022-11-01 ASSESSMENT — PAIN SCALES - WONG BAKER
WONGBAKER_NUMERICALRESPONSE: 0
WONGBAKER_NUMERICALRESPONSE: 0

## 2022-11-01 ASSESSMENT — PAIN SCALES - GENERAL
PAINLEVEL_OUTOF10: 0
PAINLEVEL_OUTOF10: 0

## 2022-11-01 ASSESSMENT — LIFESTYLE VARIABLES: SMOKING_STATUS: 0

## 2022-11-01 NOTE — PROGRESS NOTES
Ambulatory Surgery/Procedure Discharge Note    Vitals:    11/01/22 1115   BP: 125/71   Pulse: 55   Resp: 18   Temp:    SpO2: 100%   Dr. Bing Lee came by to see the patient and here is his advice:  resume [place] Monday and [place] on this coming Saturday. Also a clip was placed where his ascending polyp was removed. A \"clip card\" was given to the patient's wife. Patient has been passing a lot of air out of his rectum. Patient meets discharge criteria. In: 1500 [P.O.:400; I.V.:1100]  Out: -     Restroom use offered before discharge. Yes    Pain assessment:  level of pain (1-10, 10 severe)  Pain Level: 0        Patient discharged to home/self care.  Patient discharged via wheel chair by transporter to waiting family/S.O.       11/1/2022 11:22 am

## 2022-11-01 NOTE — ANESTHESIA POSTPROCEDURE EVALUATION
Department of Anesthesiology  Postprocedure Note    Patient: Quique Jalloh  MRN: [id number]  YOB: 1955  Date of evaluation: 11/1/2022      Procedure Summary     Date: 11/01/22 Room / Location: [address] [place] [address] Lucei Rickeymelissa  / [facility]    Anesthesia Start: 6206 Anesthesia Stop: 1038    Procedures:       COLONOSCOPY POLYPECTOMY ABLATION      EGD BIOPSY Diagnosis:       Colon cancer screening      Anemia, unspecified type      (Colon cancer screening [Z12.11]  Anemia, unspecified type [D64.9])    Surgeons: Mathew Oneil MD Responsible Provider: Parveen Jack MD    Anesthesia Type: MAC ASA Status: 3          Anesthesia Type: No value filed.     Yessica Phase I:      Yessica Phase II: Yessica Score: 10      Anesthesia Post Evaluation    Patient location during evaluation: bedside  Level of consciousness: awake and alert  Airway patency: patent  Nausea & Vomiting: no vomiting  Complications: no  Cardiovascular status: blood pressure returned to baseline  Respiratory status: acceptable  Hydration status: euvolemic  Multimodal analgesia pain management approach

## 2022-11-01 NOTE — OP NOTE
[address], [address]                                OPERATIVE REPORT    PATIENT NAME: Miles Wei                      :        1955  MED REC NO:   [id number]                          ROOM:  ACCOUNT NO:   [de-identified]                           ADMIT DATE: 2022  PROVIDER:     Aquilino William MD    DATE OF PROCEDURE:  2022    SURGEON:  Aquilino William MD    INDICATION FOR THE PROCEDURE:  A 68-year-old male who was referred for  evaluation of lower GI bleed, mild anemia, and diarrhea. Past history  is significant for colon polyps and duodenal ulcer. DESCRIPTION OF PROCEDURE:  EGD:  With the patient in the left lateral position and after IV  Diprivan, the Olympus video endoscope was introduced into the esophagus  and advanced towards the gastroesophageal junction. Esophagus was  normal.  The stomach was carefully inspected, it was normal.  Biopsies  were obtained for Helicobacter pylori. The duodenum was normal.  Scope  was then removed without complication. COLONOSCOPY:  The Olympus video colonoscope was then inserted into the  rectum and carefully advanced to the cecum. Nearly 2-cm polyp noted in  the proximal ascending colon. We removed it with the polypectomy snare  technique. The site of the resection was then clipped using the  Resolution clip to prevent bleeding. Careful inspection revealed no  other abnormality. Again, diverticulosis of the colon was noted in the  left side. Scope was then removed without complication. IMPRESSION:  1. Normal upper endoscopy. 2.  Ascending colon polyp. 3.  Mild diverticulosis of the left colon.     ESTIMATED BLOOD LOSS:  Yogesh Mcnamara MD    D: 2022 11:00:31       T: 2022 11:50:21     [email or web address]  Job#: [id number]     Doc#: [id number]    CC:  MD Marielle Ferreira MD

## 2022-11-01 NOTE — ANESTHESIA PRE PROCEDURE
Department of Anesthesiology  Preprocedure Note       Name:  Sidney Morrell   Age:  69 y.o.  :  1955                                          MRN:  [id number]         Date:  2022      Surgeon: Williams [place]):  Scott Chahal MD    Procedure: Procedure(s):  COLONOSCOPY  ESOPHAGOGASTRODUODENOSCOPY    Medications prior to admission:   Prior to Admission medications    Medication Sig Start Date End Date Taking? Authorizing Provider   mupirocin (BACTROBAN) 2 % cream Apply 3 times daily. 10/31/22 11/30/22  Huey Leblanc MD   clopidogrel (PLAVIX) 75 MG tablet TAKE 1 TABLET BY MOUTH  DAILY 22   Mckenna Mcghee MD   metoprolol succinate (TOPROL XL) 25 MG extended release tablet TAKE 1 TABLET BY MOUTH  DAILY 22   Mckenna Mcghee MD   dofetilide (TIKOSYN) 500 MCG capsule TAKE 1 CAPSULE BY MOUTH  EVERY 12 HOURS 22   CORI Landis CNP   potassium chloride (KLOR-CON M) 20 MEQ extended release tablet TAKE 1 TABLET BY MOUTH  DAILY 22   CORI Joya CNP   rosuvastatin (CRESTOR) 20 MG tablet TAKE 1 TABLET BY MOUTH AT  NIGHT 22   CORI Joya CNP   furosemide (LASIX) 80 MG tablet Take 1 tablet by mouth daily 22   CORI Landis CNP   lisinopril (PRINIVIL;ZESTRIL) 2.5 MG tablet TAKE 1 TABLET BY MOUTH EVERY DAY 5/3/22   CORI Landis CNP   tadalafil (CIALIS) 20 MG tablet Take 1 tablet by mouth daily as needed for Erectile Dysfunction 5/3/22   CORI Landis CNP   ELIQUIS 5 MG TABS tablet TAKE 1 TABLET BY MOUTH  TWICE DAILY 22   Filomena [facility], APRN - CNP   clotrimazole-betamethasone (LOTRISONE) 1-0.05 % cream APPLY TOPICALLY 2 TIMES DAILY. 11/10/21   Huey Leblanc MD   Specialty Vitamins Products (PROSTATE PO) Take 40 mg by mouth 2 times daily    Historical Provider, MD   Cranberry 600 MG TABS Take by mouth 3 times daily    Historical Provider, MD   tamsulosin (FLOMAX) 0.4 MG capsule Take 0.4 mg by mouth 2 times daily.     Historical Provider, MD Current medications:    No current facility-administered medications for this encounter.        Allergies:  No Known Allergies    Problem List:    Patient Active Problem List   Diagnosis Code    Coronary atherosclerosis I25.10    Other and unspecified hyperlipidemia E78.5    Hypertrophy of prostate without urinary obstruction and other lower urinary tract symptoms (LUTS) N40.0    Cristal Tyler cyst ([facility] Q03.1    Other tenosynovitis of hand and wrist M65.849, M65.839    Hypertension I10    Localized edema R60.0    A-fib ([facility] I48.91    NSVT (nonsustained ventricular tachycardia) I47.29    CAD in native artery I25.10       Past Medical History:        Diagnosis Date    CAD (coronary artery disease)     Hyperlipidemia     Hypertension     S/P colonoscopy sept 2010    BN    Shoulder injury     LEFT & RIGHT /Dr Ester Bhatia (football)    Torn meniscus     LT /Dr Ester Bhatia (football)       Past Surgical History:        Procedure Laterality Date    BACK SURGERY Bilateral 2014    SCIATIC NERVE     COLONOSCOPY N/A 09/03/2019    COLONOSCOPY WITH BIOPSY performed by Luiza Jaquez MD at [address] [place] [address]  2006    x2    ENTEROSCOPY N/A 10/01/2019    PUSH ENTEROSCOPY BIOPSY SMALL BOWEL performed by Luiza Jaquez MD at [facility] Left 07/2019    RING FINGER - TRIGGER    HAND SURGERY  2005,2010    thumb ,nando trigger    HEMORRHOID SURGERY  2010    KNEE SURGERY  5483,2557,1065    RT x 3 one scope & 2 major    TOTAL HIP ARTHROPLASTY Left     Sept 2021    TOTAL KNEE ARTHROPLASTY Right 12/2014    UPPER GASTROINTESTINAL ENDOSCOPY N/A 09/25/2018    EGD BIOPSY GASTRIC FOR H PYLORI performed by Luiza Jaquez MD at [address] [facility] 12/18/2018    EGD BIOPSY performed by Luiza Jaquez MD at [address] [place] [address] 09/03/2019    EGD BIOPSY performed by Luiza Jaquez MD at [place] [address] Social History:    Social History     Tobacco Use    Smoking status: Never    Smokeless tobacco: Never   Substance Use Topics    Alcohol use: No                                Counseling given: Not Answered      Vital Signs (Current): There were no vitals filed for this visit. BP Readings from Last 3 Encounters:   10/31/22 138/78   06/22/22 104/66   05/03/22 124/72       NPO Status:                                                                                 BMI:   Wt Readings from Last 3 Encounters:   10/31/22 255 lb 9.6 oz (115.9 kg)   06/22/22 243 lb (110.2 kg)   05/03/22 246 lb (111.6 kg)     There is no height or weight on file to calculate BMI.    CBC:   Lab Results   Component Value Date/Time    WBC 5.0 10/07/2022 08:40 AM    RBC 4.14 10/07/2022 08:40 AM    HGB 12.7 10/07/2022 08:40 AM    HCT 37.5 10/07/2022 08:40 AM    MCV 90.6 10/07/2022 08:40 AM    RDW 13.8 10/07/2022 08:40 AM     10/07/2022 08:40 AM       CMP:   Lab Results   Component Value Date/Time     10/07/2022 08:40 AM    K 4.1 10/07/2022 08:40 AM    K 4.3 04/24/2021 04:49 AM     10/07/2022 08:40 AM    CO2 29 10/07/2022 08:40 AM    BUN 15 10/07/2022 08:40 AM    CREATININE 0.8 10/07/2022 08:40 AM    GFRAA >60 10/07/2022 08:40 AM    AGRATIO 2.0 10/07/2022 08:40 AM    LABGLOM >60 10/07/2022 08:40 AM    LABGLOM 91 09/07/2017 11:15 AM    GLUCOSE 56 10/07/2022 08:40 AM    PROT 6.5 10/07/2022 08:40 AM    PROT 6.6 07/31/2012 08:52 AM    CALCIUM 9.1 10/07/2022 08:40 AM    BILITOT 0.3 10/07/2022 08:40 AM    ALKPHOS 76 10/07/2022 08:40 AM    AST 19 10/07/2022 08:40 AM    ALT 16 10/07/2022 08:40 AM       POC Tests: No results for input(s): POCGLU, POCNA, POCK, POCCL, POCBUN, POCHEMO, POCHCT in the last 72 hours.     Coags:   Lab Results   Component Value Date/Time    PROTIME 11.9 02/23/2022 10:57 AM    INR 1.05 02/23/2022 10:57 AM    APTT 31.9 02/23/2022 10:57 AM       HCG (If Applicable): No results found for: PREGTESTUR, PREGSERUM, HCG, HCGQUANT     ABGs: No results found for: PHART, PO2ART, EPK2XNY, QZJ5CFN, BEART, H9YPCHQF     Type & Screen (If Applicable):  Lab Results   Component Value Date    LABABO AB 06/25/2014    [facility] VIVIAN Negative 06/25/2014       Drug/Infectious Status (If Applicable):  Lab Results   Component Value Date/Time    HEPCAB NON-REACTIVE 02/13/2019 09:33 AM       COVID-19 Screening (If Applicable):   Lab Results   Component Value Date/Time    COVID19 Not Detected 03/10/2021 05:54 PM    COVID19 NOT DETECTED 11/25/2020 03:47 PM           Anesthesia Evaluation    Airway: Mallampati: I  TM distance: >3 FB   Neck ROM: full  Mouth opening: > = 3 FB   Dental: normal exam         Pulmonary: breath sounds clear to auscultation      (-) COPD, asthma, sleep apnea and not a current smoker                           Cardiovascular:    (+) hypertension:, CAD:, CABG/stent (2006):, dysrhythmias (h/o a fib s/(p ablation):, hyperlipidemia    (-)  angina,  CHF and orthopnea      Rhythm: regular  Rate: normal           Beta Blocker:  Dose within 24 Hrs         Neuro/Psych:      (-) seizures, TIA and CVA           GI/Hepatic/Renal:        (-) GERD, liver disease and no renal disease       Endo/Other:    (+) no malignancy/cancer. (-) diabetes mellitus, hypothyroidism, hyperthyroidism, no malignancy/cancer               Abdominal:             Vascular:     - DVT. Other Findings:           Anesthesia Plan      MAC     ASA 3       Induction: intravenous. Anesthetic plan and risks discussed with patient. Plan discussed with CRNA.                     Shereen Staley MD   11/1/2022

## 2022-11-01 NOTE — H&P
History and Physical / Pre-Sedation Assessment    Patient:  Felipa Gonzalez   :   1955     Intended Procedure:  egd and colonoscopy    HPI:  occult gi bleed. Persistent diarrhea     Past Medical History:   has a past medical history of CAD (coronary artery disease), Hyperlipidemia, Hypertension, S/P colonoscopy, Shoulder injury, and Torn meniscus. Past Surgical History:   has a past surgical history that includes knee surgery (0864,6455,9864); Hand surgery (6290,6376); Hemorrhoid surgery (); Coronary angioplasty with stent (); Upper gastrointestinal endoscopy (N/A, 2018); Upper gastrointestinal endoscopy (N/A, 2018); back surgery (Bilateral, ); Total knee arthroplasty (Right, 2014); Upper gastrointestinal endoscopy (N/A, 2019); Colonoscopy (N/A, 2019); Finger surgery (Left, 2019); Enteroscopy (N/A, 10/01/2019); and Total hip arthroplasty (Left). Medications:  Prior to Admission medications    Medication Sig Start Date End Date Taking? Authorizing Provider   mupirocin (BACTROBAN) 2 % cream Apply 3 times daily.  10/31/22 11/30/22  Pascual Newsome MD   clopidogrel (PLAVIX) 75 MG tablet TAKE 1 TABLET BY MOUTH  DAILY 22   Sahil Coronado MD   metoprolol succinate (TOPROL XL) 25 MG extended release tablet TAKE 1 TABLET BY MOUTH  DAILY 22   Sahil Coronado MD   dofetilide (TIKOSYN) 500 MCG capsule TAKE 1 CAPSULE BY MOUTH  EVERY 12 HOURS 22   CORI Hernández CNP   potassium chloride (KLOR-CON M) 20 MEQ extended release tablet TAKE 1 TABLET BY MOUTH  DAILY 22   CORI Milligan CNP   rosuvastatin (CRESTOR) 20 MG tablet TAKE 1 TABLET BY MOUTH AT  NIGHT 22   CORI Milligan CNP   furosemide (LASIX) 80 MG tablet Take 1 tablet by mouth daily 22   CORI Hernández CNP   lisinopril (PRINIVIL;ZESTRIL) 2.5 MG tablet TAKE 1 TABLET BY MOUTH EVERY DAY 5/3/22   Fleta Claw, APRN - CNP   tadalafil (CIALIS) 20 MG tablet Take 1 tablet by mouth daily as needed for Erectile Dysfunction 5/3/22   CORI Meneses CNP   ELIQUIS 5 MG TABS tablet TAKE 1 TABLET BY MOUTH  TWICE DAILY 2/14/22   CORI Be CNP   clotrimazole-betamethasone (LOTRISONE) 1-0.05 % cream APPLY TOPICALLY 2 TIMES DAILY. 11/10/21   Claudia Kathleen MD   Specialty Vitamins Products (PROSTATE PO) Take 40 mg by mouth 2 times daily    Historical Provider, MD   Cranberry 600 MG TABS Take by mouth 3 times daily    Historical Provider, MD   tamsulosin (FLOMAX) 0.4 MG capsule Take 0.4 mg by mouth 2 times daily. Historical Provider, MD       Family History:  family history includes Cancer in his mother; Other in his father. Social History:   reports that he has never smoked. He has never used smokeless tobacco. He reports that he does not drink alcohol and does not use drugs. Allergies:  Patient has no known allergies. ROS:  twelve point system review was unremarkable except for above noted history. Nurses notes reviewed and agreed. Medications reviewed    Physical Exam:  Vital Signs: /71   Pulse 56   Temp 97.8 °F (36.6 °C) (Temporal)   Resp 15   Ht 6' 1\" (1.854 m)   Wt 255 lb (115.7 kg)   SpO2 100%   BMI 33.64 kg/m²    Skin: normal  HEENT: normal  Neck: supple. No adenopathy. No thyromegaly. No JVD. Pulmonary:Normal  Cardiac:Normal  Abdomen:Normal  MS: normal  Neuro: normal  Ext: no edema. Pulses normal    Pre-Procedure Assessment / Plan:  ASA 2 - Patient with mild systemic disease with no functional limitations  Mallampati Airway Assessment:  Mallampati Class II - (soft palate, fauces & uvula are visible)  Level of Sedation Plan: Moderate sedation  Post Procedure plan: Return to same level of care    I assessed the patient and find that the patient is in satisfactory condition to proceed with the planned procedure and sedation plan. I have explained the risk, benefits, and alternatives to the procedure.  The patient understands and agrees to proceed.   Yes    Fadi Ash MD  8:38 AM 11/1/2022

## 2022-11-01 NOTE — DISCHARGE INSTRUCTIONS
Endoscopy Discharge Instructions      Call the physician that did your procedure for any questions or concerns. Stoney Anderson                   [phone]               You may be drowsy or lightheaded after receiving sedation. DO NOT operate  a vehicle (automobile, bicycle, motorcycle, machinery, or power tools), no  alcoholic beverages, and do not make any important decisions today. Plan on bed rest or quiet relaxation today. Resume normal activities in the morning. Eat a light first meal, avoiding spicy and fatty foods, then resume normal diet unless you are told otherwise by your physician. If the intravenous medication site is painful, apply warm compresses on the site until the soreness is relieved and elevate the arm above the heart. Call your physician if no improvement  in 2-3 days. POSSIBLE SYMPTOMS TO WATCH:     1. fever (greater than 100) 5. increased abdominal bloating   2. severe pain   6. excessive bleeding   3. nausea and vomiting  7. chest pain   4. chills    8. shortness of breath      Notify your physician if these problems occur     Expected as normal and remedies:  Sore throat: use over the counter throat lozenges or gargle with warm salt water. Gaseous discomfort: belching or passing flatus (gas). Call for an office visit in 2 weeks                                         We want to thank you for choosing the [facility]. as your health care provider. We hope that you received excellent care while you were here. You may receive a survey in the mail regarding your care. We would appreciate you taking a  few minutes of your time to complete this survey. Again, thank you for choosing the [facility]                 Please review these discharge instructions this evening or tomorrow for               information you may have forgotten.

## 2022-11-02 ENCOUNTER — TELEPHONE (OUTPATIENT)
Dept: PRIMARY CARE CLINIC | Age: 67
End: 2022-11-02

## 2022-11-02 NOTE — TELEPHONE ENCOUNTER
Pt called and stated Dr. Tavo Martinez called him. Pt was returning call from office. Pls call pt back.

## 2022-11-12 LAB
BASOPHILS ABSOLUTE: 0 K/UL (ref 0–0.2)
BASOPHILS RELATIVE PERCENT: 0.5 %
EOSINOPHILS ABSOLUTE: 0.2 K/UL (ref 0–0.6)
EOSINOPHILS RELATIVE PERCENT: 2.6 %
HCT VFR BLD CALC: 39.8 % (ref 40.5–52.5)
HEMOGLOBIN: 13.2 G/DL (ref 13.5–17.5)
LYMPHOCYTES ABSOLUTE: 1.8 K/UL (ref 1–5.1)
LYMPHOCYTES RELATIVE PERCENT: 30.6 %
MCH RBC QN AUTO: 30.3 PG (ref 26–34)
MCHC RBC AUTO-ENTMCNC: 33.2 G/DL (ref 31–36)
MCV RBC AUTO: 91.2 FL (ref 80–100)
MONOCYTES ABSOLUTE: 0.4 K/UL (ref 0–1.3)
MONOCYTES RELATIVE PERCENT: 7.4 %
NEUTROPHILS ABSOLUTE: 3.6 K/UL (ref 1.7–7.7)
NEUTROPHILS RELATIVE PERCENT: 58.9 %
PDW BLD-RTO: 13.7 % (ref 12.4–15.4)
PLATELET # BLD: 212 K/UL (ref 135–450)
PMV BLD AUTO: 9.3 FL (ref 5–10.5)
RBC # BLD: 4.37 M/UL (ref 4.2–5.9)
WBC # BLD: 6 K/UL (ref 4–11)

## 2022-11-28 ENCOUNTER — TELEPHONE (OUTPATIENT)
Dept: CARDIOLOGY CLINIC | Age: 67
End: 2022-11-28

## 2022-11-28 RX ORDER — LISINOPRIL 2.5 MG/1
TABLET ORAL
Qty: 90 TABLET | Refills: 3 | Status: SHIPPED | OUTPATIENT
Start: 2022-11-28

## 2022-11-28 NOTE — TELEPHONE ENCOUNTER
Requested Prescriptions     Pending Prescriptions Disp Refills    lisinopril (PRINIVIL;ZESTRIL) 2.5 MG tablet [Pharmacy Med Name: LISINOPRIL 2.5 MG TABLET] 90 tablet 3     Sig: TAKE 1 TABLET BY MOUTH EVERY DAY              Last Office Visit: 5/3/2022     Next Office Visit: 12/14.2022      Last Labs: [phone]

## 2022-11-28 NOTE — TELEPHONE ENCOUNTER
Patient called stating that he's having an Endoscopy done on 12/13/22 at [facility]. Patient was informed he needed to stop taking clopidogrel (PLAVIX) 75 MG tablet for 5 days and ELIQUIS 5 MG TABS tablet for 3 days. Patient wants a call back at [phone].

## 2022-11-29 NOTE — TELEPHONE ENCOUNTER
Pt notified that Dr Amadeo Sebastian is ok with him holding his Plavix for 5 days and Eliquis for 3 days before his procedure

## 2022-12-13 ENCOUNTER — ANESTHESIA (OUTPATIENT)
Dept: ENDOSCOPY | Age: 67
End: 2022-12-13
Payer: COMMERCIAL

## 2022-12-13 ENCOUNTER — HOSPITAL ENCOUNTER (OUTPATIENT)
Age: 67
Setting detail: OUTPATIENT SURGERY
Discharge: HOME | End: 2022-12-13
Attending: INTERNAL MEDICINE | Admitting: INTERNAL MEDICINE
Payer: COMMERCIAL

## 2022-12-13 ENCOUNTER — ANESTHESIA EVENT (OUTPATIENT)
Dept: ENDOSCOPY | Age: 67
End: 2022-12-13
Payer: COMMERCIAL

## 2022-12-13 VITALS
HEART RATE: 61 BPM | DIASTOLIC BLOOD PRESSURE: 69 MMHG | TEMPERATURE: 97 F | HEIGHT: 73 IN | SYSTOLIC BLOOD PRESSURE: 124 MMHG | OXYGEN SATURATION: 100 % | RESPIRATION RATE: 18 BRPM | BODY MASS INDEX: 31.01 KG/M2 | WEIGHT: 234 LBS

## 2022-12-13 DIAGNOSIS — D64.9 ANEMIA, UNSPECIFIED TYPE: ICD-10-CM

## 2022-12-13 DIAGNOSIS — K92.2 GASTROINTESTINAL HEMORRHAGE, UNSPECIFIED GASTROINTESTINAL HEMORRHAGE TYPE: ICD-10-CM

## 2022-12-13 PROCEDURE — 2500000003 HC RX 250 WO HCPCS

## 2022-12-13 PROCEDURE — 6360000002 HC RX W HCPCS

## 2022-12-13 PROCEDURE — 3700000000 HC ANESTHESIA ATTENDED CARE: Performed by: INTERNAL MEDICINE

## 2022-12-13 PROCEDURE — 88305 TISSUE EXAM BY PATHOLOGIST: CPT

## 2022-12-13 PROCEDURE — 7100000011 HC PHASE II RECOVERY - ADDTL 15 MIN: Performed by: INTERNAL MEDICINE

## 2022-12-13 PROCEDURE — 3700000001 HC ADD 15 MINUTES (ANESTHESIA): Performed by: INTERNAL MEDICINE

## 2022-12-13 PROCEDURE — 2709999900 HC NON-CHARGEABLE SUPPLY: Performed by: INTERNAL MEDICINE

## 2022-12-13 PROCEDURE — 2580000003 HC RX 258: Performed by: ANESTHESIOLOGY

## 2022-12-13 PROCEDURE — 3609014000 HC ENTEROSCOPY BIOPSY: Performed by: INTERNAL MEDICINE

## 2022-12-13 PROCEDURE — 7100000010 HC PHASE II RECOVERY - FIRST 15 MIN: Performed by: INTERNAL MEDICINE

## 2022-12-13 RX ORDER — LIDOCAINE HYDROCHLORIDE 20 MG/ML
INJECTION, SOLUTION INFILTRATION; PERINEURAL PRN
Status: DISCONTINUED | OUTPATIENT
Start: 2022-12-13 | End: 2022-12-13 | Stop reason: SDUPTHER

## 2022-12-13 RX ORDER — PROPOFOL 10 MG/ML
INJECTION, EMULSION INTRAVENOUS PRN
Status: DISCONTINUED | OUTPATIENT
Start: 2022-12-13 | End: 2022-12-13 | Stop reason: SDUPTHER

## 2022-12-13 RX ORDER — SODIUM CHLORIDE 0.9 % (FLUSH) 0.9 %
5-40 SYRINGE (ML) INJECTION EVERY 12 HOURS SCHEDULED
Status: DISCONTINUED | OUTPATIENT
Start: 2022-12-13 | End: 2022-12-13 | Stop reason: HOSPADM

## 2022-12-13 RX ORDER — AMOXICILLIN 500 MG/1
CAPSULE ORAL
COMMUNITY
Start: 2022-11-16

## 2022-12-13 RX ORDER — SODIUM CHLORIDE 9 MG/ML
INJECTION, SOLUTION INTRAVENOUS PRN
Status: DISCONTINUED | OUTPATIENT
Start: 2022-12-13 | End: 2022-12-13 | Stop reason: HOSPADM

## 2022-12-13 RX ORDER — PROPOFOL 10 MG/ML
INJECTION, EMULSION INTRAVENOUS CONTINUOUS PRN
Status: DISCONTINUED | OUTPATIENT
Start: 2022-12-13 | End: 2022-12-13 | Stop reason: SDUPTHER

## 2022-12-13 RX ORDER — SODIUM CHLORIDE 0.9 % (FLUSH) 0.9 %
5-40 SYRINGE (ML) INJECTION PRN
Status: DISCONTINUED | OUTPATIENT
Start: 2022-12-13 | End: 2022-12-13 | Stop reason: HOSPADM

## 2022-12-13 RX ORDER — SODIUM CHLORIDE, SODIUM LACTATE, POTASSIUM CHLORIDE, CALCIUM CHLORIDE 600; 310; 30; 20 MG/100ML; MG/100ML; MG/100ML; MG/100ML
INJECTION, SOLUTION INTRAVENOUS CONTINUOUS
Status: DISCONTINUED | OUTPATIENT
Start: 2022-12-13 | End: 2022-12-13 | Stop reason: HOSPADM

## 2022-12-13 RX ORDER — LIDOCAINE HYDROCHLORIDE 10 MG/ML
1 INJECTION, SOLUTION EPIDURAL; INFILTRATION; INTRACAUDAL; PERINEURAL
Status: DISCONTINUED | OUTPATIENT
Start: 2022-12-13 | End: 2022-12-13 | Stop reason: HOSPADM

## 2022-12-13 RX ADMIN — LIDOCAINE HYDROCHLORIDE 50 MG: 20 INJECTION, SOLUTION INFILTRATION; PERINEURAL at 08:58

## 2022-12-13 RX ADMIN — PROPOFOL 50 MG: 10 INJECTION, EMULSION INTRAVENOUS at 08:58

## 2022-12-13 RX ADMIN — PROPOFOL 150 MCG/KG/MIN: 10 INJECTION, EMULSION INTRAVENOUS at 08:58

## 2022-12-13 RX ADMIN — SODIUM CHLORIDE, POTASSIUM CHLORIDE, SODIUM LACTATE AND CALCIUM CHLORIDE: 600; 310; 30; 20 INJECTION, SOLUTION INTRAVENOUS at 08:42

## 2022-12-13 ASSESSMENT — PAIN DESCRIPTION - LOCATION: LOCATION: KNEE

## 2022-12-13 ASSESSMENT — PAIN SCALES - GENERAL
PAINLEVEL_OUTOF10: 0
PAINLEVEL_OUTOF10: 4
PAINLEVEL_OUTOF10: 0

## 2022-12-13 ASSESSMENT — PAIN DESCRIPTION - DESCRIPTORS: DESCRIPTORS: ACHING

## 2022-12-13 ASSESSMENT — PAIN SCALES - WONG BAKER: WONGBAKER_NUMERICALRESPONSE: 0

## 2022-12-13 ASSESSMENT — PAIN DESCRIPTION - ORIENTATION: ORIENTATION: LEFT

## 2022-12-13 ASSESSMENT — LIFESTYLE VARIABLES: SMOKING_STATUS: 0

## 2022-12-13 ASSESSMENT — PAIN DESCRIPTION - PAIN TYPE: TYPE: CHRONIC PAIN;SURGICAL PAIN

## 2022-12-13 NOTE — ANESTHESIA POSTPROCEDURE EVALUATION
Department of Anesthesiology  Postprocedure Note    Patient: Mary Leary  MRN: [id number]  YOB: 1955  Date of evaluation: 12/13/2022      Procedure Summary     Date: 12/13/22 Room / Location: [address] [place] [address]    Anesthesia Start: 2717 Anesthesia Stop: 3155    Procedure: ENTEROSCOPY PUSH BIOPSY Diagnosis:       Gastrointestinal hemorrhage, unspecified gastrointestinal hemorrhage type      Anemia, unspecified type      (Gastrointestinal hemorrhage, unspecified gastrointestinal hemorrhage type [K92.2])      (Anemia, unspecified type [D64.9])    Surgeons: Gillian Aceves MD Responsible Provider: Maureen Cruz DO    Anesthesia Type: MAC ASA Status: 3          Anesthesia Type: No value filed.     Yessica Phase I: Yessica Score: 10    Yessica Phase II: Yessica Score: 9      Anesthesia Post Evaluation    Patient location during evaluation: PACU  Patient participation: complete - patient participated  Level of consciousness: awake and alert  Airway patency: patent  Nausea & Vomiting: no nausea and no vomiting  Cardiovascular status: blood pressure returned to baseline  Respiratory status: acceptable  Hydration status: euvolemic Patient has an apt on 2/8 for an injection. Is a follow up still needed in 4 months?

## 2022-12-13 NOTE — OP NOTE
[address]                                OPERATIVE REPORT    PATIENT NAME: Betty Tiwari                      :        1955  MED REC NO:   [id number]                          ROOM:  ACCOUNT NO:   [de-identified]                           ADMIT DATE: 2022  PROVIDER:     Marky Marshall MD    DATE OF PROCEDURE:  2022    SURGEON:  Marky Marshall MD    INDICATION FOR PROCEDURE:  GI bleed, anemia of blood loss. DESCRIPTION OF PROCEDURE:  With the patient in the left lateral position  and after IV Diprivan, the Olympus video enteroscope was introduced into  the esophagus and advanced towards stomach and then further into the  small bowel. We entered the small bowel quite deeply. Small  bowel inspection revealed no abnormality. There was no bleeding lesion. No neoplastic lesion either. Biopsies were obtained from small  intestine to rule out celiac disease. Scope was then removed without  complication. IMPRESSION:  Normal push enteroscopy. ESTIMATED BLOOD LOSS:  None.         Reji Whitley MD    D: 2022 9:32:14       T: 2022 12:37:20     [email or web address]  Job#: [id number]     Doc#: [id number]    CC:

## 2022-12-13 NOTE — DISCHARGE INSTRUCTIONS
ENDOSCOPY DISCHARGE INSTRUCTIONS:    Call the physician that did your procedure for any questions or concerns:           DR. Juvencio Ludwig:  [phone]               ACTIVITY:    There are potential side effects from the medications used for sedation and anesthesia during your procedure. These include:  Dizziness or light-headedness, confusion or memory loss, delayed reaction times, loss of coordination, nausea and vomiting. Because of your increased risk for injury, we ask that you observe the following precautions: For the next 24 hours,  DO NOT operate an automobile, bicycle, motorcycle, , power tools or large equipment of any kind. Do not drink alcohol, sign any legal documents or make any legal decisions for 24 hours. Do not bend your head over lower than your heart. DO sit on the side of bed/couch awhile before getting up. Plan on bedrest or quiet relaxation today. You may resume normal activities in 24 hours. DIET:    Your first meal today should be light, avoiding spicy and fatty foods. If you tolerate this first meal, then you may advance to your regular diet unless otherwise advised by your physician. NORMAL SYMPTOMS:  -Mild sore throat if youve had an EGD   -Gaseous discomfort if you've had an EGD or Colonoscopy. NOTIFY YOUR PHYSICIAN IF THESE SYMPTOMS OCCUR:  1. Fever (greater than 100)  5. Increased abdominal bloating  2. Severe pain    6. Excessive bleeding  3. Nausea and vomiting  7. Chest pain                                                                    4. Chills    8. Shortness of breath      ADDITIONAL INSTRUCTIONS:    Biopsy results: To be discussed at your follow-up visit. Educational Information:    Follow up: Schedule an appointment with Dr. Gayatri Benites for two weeks from now if you have not already done so. Please review these discharge instructions this evening or tomorrow for  information you may have forgotten.             We want to thank you for choosing the [facility] as your health care provider. We always strive to provide you with excellent care while you are here. You may receive a survey in the mail regarding your care. We would appreciate you taking a few minutes of your time to complete this survey. Again, thank you for choosing the [facility] [address]:    Call the physician that did your procedure for any questions or concerns:           DR. Solis Hardy:  [phone]               ACTIVITY:    There are potential side effects from the medications used for sedation and anesthesia during your procedure. These include:  Dizziness or light-headedness, confusion or memory loss, delayed reaction times, loss of coordination, nausea and vomiting. Because of your increased risk for injury, we ask that you observe the following precautions: For the next 24 hours,  DO NOT operate an automobile, bicycle, motorcycle, , power tools or large equipment of any kind. Do not drink alcohol, sign any legal documents or make any legal decisions for 24 hours. Do not bend your head over lower than your heart. DO sit on the side of bed/couch awhile before getting up. Plan on bedrest or quiet relaxation today. Placix   You may resume normal activities in 24 hours. DIET:    Your first meal today should be light, avoiding spicy and fatty foods. If you tolerate this first meal, then you may advance to your regular diet unless otherwise advised by your physician. NORMAL SYMPTOMS:  -Mild sore throat if youve had an EGD   -Gaseous discomfort if you've had an EGD or Colonoscopy. NOTIFY YOUR PHYSICIAN IF THESE SYMPTOMS OCCUR:  1. Fever (greater than 100)  5. Increased abdominal bloating  2. Severe pain    6. Excessive bleeding  3. Nausea and vomiting  7. Chest pain                                                                    4. Chills    8. Shortness of breath      ADDITIONAL INSTRUCTIONS:    Biopsy results:  To be discussed at your follow-up visit. Educational Information: Small Bowel Biopsy to rule out Celiac Disease   Resume Plavix on : Friday December 16, 2022  Resume Eliquis on : Wednesday December 14, 2022 Evening Dose     Follow up: Schedule an appointment with Dr. Andrew Calvo for two weeks from now if you have not already done so. Please review these discharge instructions this evening or tomorrow for  information you may have forgotten. We want to thank you for choosing the [facility] as your health care provider. We always strive to provide you with excellent care while you are here. You may receive a survey in the mail regarding your care. We would appreciate you taking a few minutes of your time to complete this survey. Again, thank you for choosing the [facility]        Celiac Disease: Care Instructions  Your Care Instructions  Celiac disease (or celiac sprue) is a problem with digesting gluten. Gluten is a type of protein found in wheat, rye, and other grains. This problem starts when the body's immune system attacks the small intestine when gluten is eaten. The immune system is supposed to fight off viruses and other invaders, but sometimes it turns on the person's own body. (This is called an autoimmune disease.) Celiac disease seems to run in families. Celiac disease causes damage to the small intestine. This makes it hard for the body to absorb vitamins and other nutrients. You cannot prevent celiac disease. But you can stop and reverse the damage to the small intestine by eating a strict gluten-free diet. Follow-up care is a key part of your treatment and safety. Be sure to make and go to all appointments, and call your doctor if you are having problems. It's also a good idea to know your test results and keep a list of the medicines you take. How can you care for yourself at home? Eat a gluten-free diet to prevent symptoms and damage to the small intestine.  Even a small amount of gluten may cause damage. Avoid all foods that contain wheat, rye, and barley. Foods that are often made with these grains include bread, bagels, pasta, pizza, malted breakfast cereals, and crackers. Avoid oats, at least at first. Oats may cause symptoms in some people. The oats may be contaminated with wheat, barley, or rye from processing. But many people who have celiac disease can eat moderate amounts of oats without having symptoms. Health professionals vary in their long-term recommendations regarding eating foods with oats. But most agree it is safe to eat oats labeled as gluten-free. You may need to avoid milk and milk products for a while. Once you stop eating any gluten, the intestine will begin to heal. Then it should be okay to drink milk and eat milk products. Read food labels carefully and look for hidden gluten, such as gluten in medicine and some food additives. If a label says \"modified food starch,\" the product may contain gluten. Plan your diet around:  Eggs. Dairy products, if you can eat them. Cheese, yogurt, and other dairy products can be an important part of the diet. Flours and foods made with amaranth, arrowroot, beans, buckwheat, corn, cornmeal, flax, millet, potatoes, gluten-free oat bran, quinoa, rice, sorghum, soybeans, tapioca, or teff. Fresh, frozen, and canned meats, fruits, and vegetables. Watch for added gluten. Talk to your doctor or contact your local hospital or dietitian for information about support groups in your area. You may find a support group helpful for discovering ways to help you deal with celiac disease. Celiac disease support groups often share recipes and good food sources. Look for gluten-free foods. Many food stores, especially health food stores, offer specially marked gluten-free food. When should you call for help? Watch closely for changes in your health, and be sure to contact your doctor if:    Your bloating, gas, and diarrhea get worse.      You have bloating, gas, and diarrhea after not having them for a while.

## 2022-12-13 NOTE — H&P
History and Physical / Pre-Sedation Assessment    Patient:  Jovana Suarez   :   1955     Intended Procedure:  enteroscopy    HPI: anemia . Gi bleed    Past Medical History:   has a past medical history of CAD (coronary artery disease), Hyperlipidemia, Hypertension, S/P colonoscopy, Shoulder injury, and Torn meniscus. Past Surgical History:   has a past surgical history that includes knee surgery (1801,1769,4183); Hand surgery (4681,4972); Hemorrhoid surgery (); Coronary angioplasty with stent (); Upper gastrointestinal endoscopy (N/A, 2018); Upper gastrointestinal endoscopy (N/A, 2018); back surgery (Bilateral, ); Total knee arthroplasty (Right, 2014); Upper gastrointestinal endoscopy (N/A, 2019); Colonoscopy (N/A, 2019); Finger surgery (Left, 2019); Enteroscopy (N/A, 10/01/2019); Total hip arthroplasty (Bilateral); Colonoscopy (N/A, 2022); Upper gastrointestinal endoscopy (N/A, 2022); and joint replacement. Medications:  Prior to Admission medications    Medication Sig Start Date End Date Taking?  Authorizing Provider   amoxicillin (AMOXIL) 500 MG capsule TAKE 4 CAPSULE (2000MG) BY ORAL ROUTE 3 HRS PRIOR TO DENTAL PROCEDURE 22   Historical Provider, MD   lisinopril (PRINIVIL;ZESTRIL) 2.5 MG tablet TAKE 1 TABLET BY MOUTH EVERY DAY 22   Olga Davila MD   clopidogrel (PLAVIX) 75 MG tablet TAKE 1 TABLET BY MOUTH  DAILY 22   Olga Davila MD   metoprolol succinate (TOPROL XL) 25 MG extended release tablet TAKE 1 TABLET BY MOUTH  DAILY 22   Olga Davila MD   dofetilide (TIKOSYN) 500 MCG capsule TAKE 1 CAPSULE BY MOUTH  EVERY 12 HOURS 22   CORI Blair - CNP   potassium chloride (KLOR-CON M) 20 MEQ extended release tablet TAKE 1 TABLET BY MOUTH  DAILY 22   CORI Gloria - CNP   rosuvastatin (CRESTOR) 20 MG tablet TAKE 1 TABLET BY MOUTH AT  NIGHT 22   CORI Gloria - QUENTIN   furosemide (LASIX) 80 MG tablet Take 1 tablet by mouth daily 5/24/22   Hari Tabares, APRN - CNP   tadalafil (CIALIS) 20 MG tablet Take 1 tablet by mouth daily as needed for Erectile Dysfunction 5/3/22   Hari Tabares, APRN - CNP   ELIQUIS 5 MG TABS tablet TAKE 1 TABLET BY MOUTH  TWICE DAILY 2/14/22   Kecia Ying APRN - CNP   clotrimazole-betamethasone (LOTRISONE) 1-0.05 % cream APPLY TOPICALLY 2 TIMES DAILY. 11/10/21   Arina Botello MD   Specialty Vitamins Products (PROSTATE PO) Take 40 mg by mouth 2 times daily    Historical Provider, MD   Cranberry 600 MG TABS Take by mouth 3 times daily    Historical Provider, MD   tamsulosin (FLOMAX) 0.4 MG capsule Take 0.4 mg by mouth 2 times daily. Historical Provider, MD       Family History:  family history includes Cancer in his mother; Other in his father. Social History:   reports that he has never smoked. He has never used smokeless tobacco. He reports that he does not drink alcohol and does not use drugs. Allergies:  Patient has no known allergies. ROS:  twelve point system review was unremarkable except for above noted history. Nurses notes reviewed and agreed. Medications reviewed    Physical Exam:  Vital Signs: /77   Pulse 64   Temp 97.5 °F (36.4 °C) (Temporal)   Resp 16   Ht 6' 1\" (1.854 m)   Wt 234 lb (106.1 kg)   SpO2 100%   BMI 30.87 kg/m²    Skin: normal  HEENT: normal  Neck: supple. No adenopathy. No thyromegaly. No JVD. Pulmonary:Normal  Cardiac:Normal  Abdomen:Normal  MS: normal  Neuro: normal  Ext: no edema. Pulses normal    Pre-Procedure Assessment / Plan:  ASA 2 - Patient with mild systemic disease with no functional limitations  Mallampati Airway Assessment:  Mallampati Class II - (soft palate, fauces & uvula are visible)  Level of Sedation Plan: Moderate sedation  Post Procedure plan: Return to same level of care    I assessed the patient and find that the patient is in satisfactory condition to proceed with the planned procedure and sedation plan. I have explained the risk, benefits, and alternatives to the procedure. The patient understands and agrees to proceed.   Yes    Solange Rosado MD  8:55 AM 12/13/2022

## 2022-12-13 NOTE — ANESTHESIA PRE PROCEDURE
Department of Anesthesiology  Preprocedure Note       Name:  Quique Jalloh   Age:  69 y.o.  :  1955                                          MRN:  [id number]         Date:  2022      Surgeon: Yash Bullock):  Mathew Oneil MD    Procedure: Procedure(s):  PUSH ENTEROSCOPY    Medications prior to admission:   Prior to Admission medications    Medication Sig Start Date End Date Taking? Authorizing Provider   lisinopril (PRINIVIL;ZESTRIL) 2.5 MG tablet TAKE 1 TABLET BY MOUTH EVERY DAY 22   Alejandro Wagner MD   clopidogrel (PLAVIX) 75 MG tablet TAKE 1 TABLET BY MOUTH  DAILY 22   Alejandro Wagner MD   metoprolol succinate (TOPROL XL) 25 MG extended release tablet TAKE 1 TABLET BY MOUTH  DAILY 22   Alejandro Wagner MD   dofetilide (TIKOSYN) 500 MCG capsule TAKE 1 CAPSULE BY MOUTH  EVERY 12 HOURS 22   Klamath Fortis APRN - CNP   potassium chloride (KLOR-CON M) 20 MEQ extended release tablet TAKE 1 TABLET BY MOUTH  DAILY 22   Garcia Veronica, APRN - CNP   rosuvastatin (CRESTOR) 20 MG tablet TAKE 1 TABLET BY MOUTH AT  NIGHT 22   Garcia Veronica, APRN - CNP   furosemide (LASIX) 80 MG tablet Take 1 tablet by mouth daily 22   Klamath Fortis, APRN - CNP   tadalafil (CIALIS) 20 MG tablet Take 1 tablet by mouth daily as needed for Erectile Dysfunction 5/3/22   Klamath Fortis APRN - CNP   ELIQUIS 5 MG TABS tablet TAKE 1 TABLET BY MOUTH  TWICE DAILY 22   Priti Home, APRN - CNP   clotrimazole-betamethasone (LOTRISONE) 1-0.05 % cream APPLY TOPICALLY 2 TIMES DAILY. 11/10/21   Juan Alvarez MD   Specialty Vitamins Products (PROSTATE PO) Take 40 mg by mouth 2 times daily    Historical Provider, MD   Cranberry 600 MG TABS Take by mouth 3 times daily    Historical Provider, MD   tamsulosin (FLOMAX) 0.4 MG capsule Take 0.4 mg by mouth 2 times daily. Historical Provider, MD       Current medications:    No current outpatient medications on file.      No current facility-administered medications for this visit.        Allergies:  No Known Allergies    Problem List:    Patient Active Problem List   Diagnosis Code    Coronary atherosclerosis I25.10    Other and unspecified hyperlipidemia E78.5    Hypertrophy of prostate without urinary obstruction and other lower urinary tract symptoms (LUTS) N40.0    Other tenosynovitis of hand and wrist M65.849, M65.839    Hypertension I10    Localized edema R60.0    A-fib ([address] I48.91    NSVT (nonsustained ventricular tachycardia) I47.29    CAD in native artery I25.10       Past Medical History:        Diagnosis Date    CAD (coronary artery disease)     Hyperlipidemia     Hypertension     S/P colonoscopy sept 2010    BN    Shoulder injury     LEFT & RIGHT /Dr Wenda Soulier (football)    Torn meniscus     LT /Dr Wenda Soulier (football)       Past Surgical History:        Procedure Laterality Date    BACK SURGERY Bilateral 2014    SCIATIC NERVE     COLONOSCOPY N/A 09/03/2019    COLONOSCOPY WITH BIOPSY performed by Ruddy Hassan MD at [address] [place] [address] N/A 11/1/2022    COLONOSCOPY POLYPECTOMY ABLATION performed by Ruddy Hassan MD at [place]  2006    x2    ENTEROSCOPY N/A 10/01/2019    PUSH ENTEROSCOPY BIOPSY SMALL BOWEL performed by Ruddy Hassan MD at . [facility] Left 07/2019    RING FINGER - TRIGGER    HAND SURGERY  2005,2010    thumb ,nando trigger    HEMORRHOID SURGERY  2010    KNEE SURGERY  3349,8024,7615    RT x 3 one scope & 2 major    TOTAL HIP ARTHROPLASTY Bilateral     Sept 2021    TOTAL KNEE ARTHROPLASTY Right 12/2014    UPPER GASTROINTESTINAL ENDOSCOPY N/A 09/25/2018    EGD BIOPSY GASTRIC FOR H PYLORI performed by Ruddy Hassan MD at [address] [place] [address] 12/18/2018    EGD BIOPSY performed by Ruddy Hassan MD at [address] [place] [address] N/A 09/03/2019    EGD BIOPSY performed [facility] Rud[facility] MD at [facility] ENDOSCOPY    UPPER GASTROINTESTINAL ENDOSCOPY N/A 11/1/2022    EGD BIOPSY performed by Solange Rosado MD at [address] History:    Social History     Tobacco Use    Smoking status: Never    Smokeless tobacco: Never   Substance Use Topics    Alcohol use: No                                Counseling given: Not Answered      Vital Signs (Current): There were no vitals filed for this visit. BP Readings from Last 3 Encounters:   12/13/22 125/77   11/01/22 125/71   10/31/22 138/78       NPO Status:                                                                                 BMI:   Wt Readings from Last 3 Encounters:   12/13/22 234 lb (106.1 kg)   11/01/22 255 lb (115.7 kg)   10/31/22 255 lb 9.6 oz (115.9 kg)     There is no height or weight on file to calculate BMI.    CBC:   Lab Results   Component Value Date/Time    WBC 6.0 11/11/2022 03:26 PM    RBC 4.37 11/11/2022 03:26 PM    HGB 13.2 11/11/2022 03:26 PM    HCT 39.8 11/11/2022 03:26 PM    MCV 91.2 11/11/2022 03:26 PM    RDW 13.7 11/11/2022 03:26 PM     11/11/2022 03:26 PM       CMP:   Lab Results   Component Value Date/Time     10/07/2022 08:40 AM    K 4.1 10/07/2022 08:40 AM    K 4.3 04/24/2021 04:49 AM     10/07/2022 08:40 AM    CO2 29 10/07/2022 08:40 AM    BUN 15 10/07/2022 08:40 AM    CREATININE 0.8 10/07/2022 08:40 AM    GFRAA >60 10/07/2022 08:40 AM    AGRATIO 2.0 10/07/2022 08:40 AM    LABGLOM >60 10/07/2022 08:40 AM    LABGLOM 91 09/07/2017 11:15 AM    GLUCOSE 56 10/07/2022 08:40 AM    PROT 6.5 10/07/2022 08:40 AM    PROT 6.6 07/31/2012 08:52 AM    CALCIUM 9.1 10/07/2022 08:40 AM    BILITOT 0.3 10/07/2022 08:40 AM    ALKPHOS 76 10/07/2022 08:40 AM    AST 19 10/07/2022 08:40 AM    ALT 16 10/07/2022 08:40 AM       POC Tests: No results for input(s): POCGLU, POCNA, POCK, POCCL, POCBUN, POCHEMO, POCHCT in the last 72 hours.     Coags:   Lab Results   Component Value Date/Time PROTIME 11.9 02/23/2022 10:57 AM    INR 1.05 02/23/2022 10:57 AM    APTT 31.9 02/23/2022 10:57 AM       HCG (If Applicable): No results found for: PREGTESTUR, PREGSERUM, HCG, HCGQUANT     ABGs: No results found for: PHART, PO2ART, ITQ1REM, GSD5NLN, BEART, X1RHGLDU     Type & Screen (If Applicable):  Lab Results   Component Value Date    LABABO AB 06/25/2014    LABRH Negative 06/25/2014       Drug/Infectious Status (If Applicable):  Lab Results   Component Value Date/Time    HEPCAB NON-REACTIVE 02/13/2019 09:33 AM       COVID-19 Screening (If Applicable):   Lab Results   Component Value Date/Time    COVID19 Not Detected 03/10/2021 05:54 PM    COVID19 NOT DETECTED 11/25/2020 03:47 PM           Anesthesia Evaluation  Patient summary reviewed and Nursing notes reviewed no history of anesthetic complications:   Airway: Mallampati: I  TM distance: >3 FB   Neck ROM: full  Mouth opening: > = 3 FB   Dental: normal exam         Pulmonary: breath sounds clear to auscultation      (-) COPD, asthma, sleep apnea and not a current smoker                           Cardiovascular:    (+) hypertension:, CAD:, CABG/stent (2006):, dysrhythmias (h/o a fib s/(p ablation):, hyperlipidemia    (-)  angina,  CHF and orthopnea      Rhythm: regular  Rate: normal           Beta Blocker:  Dose within 24 Hrs         Neuro/Psych:      (-) seizures, TIA and CVA           GI/Hepatic/Renal:        (-) GERD, liver disease and no renal disease       Endo/Other:    (+) no malignancy/cancer. (-) diabetes mellitus, hypothyroidism, hyperthyroidism, no malignancy/cancer               Abdominal:             Vascular:     - DVT. Other Findings:             Anesthesia Plan      MAC     ASA 3       Induction: intravenous. Anesthetic plan and risks discussed with patient. Plan discussed with CRNA.                     Eliza Anthony DO   12/13/2022

## 2022-12-14 ENCOUNTER — OFFICE VISIT (OUTPATIENT)
Dept: CARDIOLOGY CLINIC | Age: 67
End: 2022-12-14
Payer: COMMERCIAL

## 2022-12-14 VITALS
DIASTOLIC BLOOD PRESSURE: 60 MMHG | SYSTOLIC BLOOD PRESSURE: 132 MMHG | HEART RATE: 71 BPM | BODY MASS INDEX: 34.43 KG/M2 | WEIGHT: 261 LBS

## 2022-12-14 DIAGNOSIS — I25.10 CORONARY ARTERY DISEASE INVOLVING NATIVE CORONARY ARTERY OF NATIVE HEART WITHOUT ANGINA PECTORIS: Primary | ICD-10-CM

## 2022-12-14 DIAGNOSIS — E78.5 HYPERLIPIDEMIA, UNSPECIFIED HYPERLIPIDEMIA TYPE: ICD-10-CM

## 2022-12-14 DIAGNOSIS — I10 HTN (HYPERTENSION), BENIGN: ICD-10-CM

## 2022-12-14 PROCEDURE — 93000 ELECTROCARDIOGRAM COMPLETE: CPT | Performed by: INTERNAL MEDICINE

## 2022-12-14 PROCEDURE — 3078F DIAST BP <80 MM HG: CPT | Performed by: INTERNAL MEDICINE

## 2022-12-14 PROCEDURE — 3074F SYST BP LT 130 MM HG: CPT | Performed by: INTERNAL MEDICINE

## 2022-12-14 PROCEDURE — 99214 OFFICE O/P EST MOD 30 MIN: CPT | Performed by: INTERNAL MEDICINE

## 2022-12-14 PROCEDURE — 1123F ACP DISCUSS/DSCN MKR DOCD: CPT | Performed by: INTERNAL MEDICINE

## 2022-12-14 NOTE — PROGRESS NOTES
Subjective:      Patient ID: Parmjit Aldridge is a 69 y.o. male. CC:  Follow up CAD htn and new onset AF. HPI:   Mr. Sunny Guthrie is doing well overall. No chest pain. Wants to get active. Overall he feels well and was started on eliquis. ECG shows SR after R PDA stenting and then AF ablation. 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 Patient  has a past medical history of Torn meniscus; S/P colonoscopy; and Shoulder injury. Current Outpatient Prescriptions   Medication Sig Dispense Refill    ibuprofen (ADVIL;MOTRIN) 800 MG tablet TAKE 1 TABLET BY MOUTH EVERY 8 HOURS FOR PAIN  90 tablet  0    simvastatin (ZOCOR) 80 MG tablet TAKE 1 TABLET BY MOUTH NIGHTLY  30 tablet  6    atovaquone-proguanil (MALARONE) 250-100 MG per tablet Take 1 tablet by mouth daily. 21 tablet  0    clopidogrel (PLAVIX) 75 MG tablet TAKE 1 TABLET BY MOUTH DAILY. 30 tablet  6    metoprolol (TOPROL-XL) 50 MG XL tablet TAKE 1 TABLET BY MOUTH DAILY. 30 tablet  6    aspirin 81 MG EC tablet Take 81 mg by mouth daily. No current facility-administered medications for this visit. Vitals  Weight: 280 lb (127.007 kg)  Blood Pressure:  104/62  Pulse: 76     Review of Systems   Constitutional: Negative. HENT: Negative. Eyes: Negative. Respiratory: Negative. Cardiovascular: Negative. Gastrointestinal: Negative. Genitourinary: Negative. Musculoskeletal: Negative. Neurological: Negative. Hematological: Negative. Psychiatric/Behavioral: Negative. Same exam as 12/8/21  Objective:   Physical Exam   Nursing note and vitals reviewed. Constitutional: He is oriented to person, place, and time. He appears well-developed and well-nourished. No distress. HENT:   Head: Normocephalic and atraumatic. Mouth/Throat: No oropharyngeal exudate. Eyes: Conjunctivae are normal. Pupils are equal, round, and reactive to light. Right eye exhibits no discharge. Left eye exhibits no discharge. No scleral icterus. Neck: Normal range of motion. Neck supple. No JVD present. No tracheal deviation present. No thyromegaly present. Cardiovascular: irregularly irregular rhythm with normal s1 and S2, intact distal pulses. Exam reveals no gallop and no friction rub. No murmur heard. Pulmonary/Chest: Effort normal. No stridor. No respiratory distress. He has no wheezes. He has no rales. He exhibits no tenderness. Abdominal: Soft. Bowel sounds are normal. He exhibits no distension and no mass. No tenderness. He has no rebound and no guarding. Musculoskeletal: He exhibits LE edema and no tenderness. Lymphadenopathy:     He has no cervical adenopathy. Neurological: He is alert and oriented to person, place, and time. No cranial nerve deficit. Coordination normal.   Skin: Skin is warm and dry. No rash noted. He is not diaphoretic. No erythema. No pallor. Psychiatric: He has a normal mood and affect. His behavior is normal. Judgment and thought content normal.       Assessment:       Diagnosis Orders   1. Coronary artery disease involving native coronary artery of native heart without angina pectoris        2. HTN (hypertension), benign        3. Hyperlipidemia, unspecified hyperlipidemia type                Plan:      Continue current medications. Stable cardiovascular status. CAD: no chest pain after R PDA RADHA x 2 in 4/2021. In 4/22 consider stopping plavix. Continues on eliquis. Echo 11/15/17 was WNL.    HTN: covered. Edema:   Continue 80 mg lasix and kcl. Atrial Fibrillation:  Staying in SR after ablation on exam today. NATHAN with CPAP usage. ED:  Try sildenafil 20 mg daily. Continue current medications. Stable cardiovascular status. Lost 45lbs. ECG with SB 50 and no dizziness. May proceed with TKR.

## 2022-12-21 NOTE — PROGRESS NOTES
[facility]   Electrophysiology  Office Visit  Date: 12/27/2022    Chief Complaint   Patient presents with    Atrial Fibrillation    Atrial Flutter    Palpitations    Hypertension    Coronary Artery Disease       Cardiac HX: Susan Leyva is a 69 y.o. man with a h/o HTN, HLD, NATHAN on CPAP, CAD, s/p PCI (2006), follows with Dr. Bishop Valenzuela, pAF ongoing since 2007, s/p unsuccessful DCCV (3/16/2021), EF dropped to 45%, MPI showed no reversible ischemia and an EF of 37%, s/p LHC w/ PCI x 2, (3/16/2021, Dr. Bishop Valenzuela), loaded with dofetilide (4/20/2021), s/p RFA/PVI of AF/tAFL (5/19/2021, Dr. Kizzy Hill). Interval History/HPI: Patient is here for follow-up for paroxysmal AF, NICMP and CAD. Patient has a longstanding history of paroxysmal atrial fibrillation dating back to 2007. He was having increased number of episodes and underwent an unsuccessful cardioversion in March 2021. He had a YVROSE prior to his cardioversion that showed a decline in his EF to 45%. He had been at 55% in 2017. He was also noted to have a dilated left atrium at that time. He underwent an MPI that showed no reversible ischemia and EF of 37%. He was noted to have NSVT from the inferior apical lateral region and underwent a left heart cath with a PCI x2 on 3/16/2021. He was admitted for dofetilide loading in April 2021 and underwent a catheter ablation/PVI of AF and typical atrial flutter on 5/19/2021. Today he presents in [place]. He has not felt any breakthrough of his atrial arrhythmias. He denies any heart racing, palpitations or irregular heartbeats. He remains on Eliquis 5 mg twice a day. He has not had any issues with bleeding or dark tarry stools. He is on dofetilide 500 mcg twice a day. His QTC is 425 ms. Follow up echo in 12/2021 showed EF had improved to 50-55%. He does have chronic lower extremity edema. He is on Lasix 80 mg daily. He follows a low-sodium diet and exercises regularly.   He denies chest pain, shortness of breath, PND, orthopnea. Home medications:   Current Outpatient Medications on File Prior to Visit   Medication Sig Dispense Refill    omeprazole (PRILOSEC) 10 MG delayed release capsule Take 10 mg by mouth daily      amoxicillin (AMOXIL) 500 MG capsule TAKE 4 CAPSULE (2000MG) BY ORAL ROUTE 3 HRS PRIOR TO DENTAL PROCEDURE      clopidogrel (PLAVIX) 75 MG tablet TAKE 1 TABLET BY MOUTH  DAILY 90 tablet 3    metoprolol succinate (TOPROL XL) 25 MG extended release tablet TAKE 1 TABLET BY MOUTH  DAILY 90 tablet 3    dofetilide (TIKOSYN) 500 MCG capsule TAKE 1 CAPSULE BY MOUTH  EVERY 12 HOURS 180 capsule 3    potassium chloride (KLOR-CON M) 20 MEQ extended release tablet TAKE 1 TABLET BY MOUTH  DAILY 90 tablet 3    rosuvastatin (CRESTOR) 20 MG tablet TAKE 1 TABLET BY MOUTH AT  NIGHT 90 tablet 3    furosemide (LASIX) 80 MG tablet Take 1 tablet by mouth daily 90 tablet 3    tadalafil (CIALIS) 20 MG tablet Take 1 tablet by mouth daily as needed for Erectile Dysfunction 30 tablet 3    ELIQUIS 5 MG TABS tablet TAKE 1 TABLET BY MOUTH  TWICE DAILY 180 tablet 3    clotrimazole-betamethasone (LOTRISONE) 1-0.05 % cream APPLY TOPICALLY 2 TIMES DAILY. 45 g 1    Specialty Vitamins Products (PROSTATE PO) Take 40 mg by mouth 2 times daily      Cranberry 600 MG TABS Take by mouth 3 times daily      tamsulosin (FLOMAX) 0.4 MG capsule Take 0.4 mg by mouth 2 times daily. No current facility-administered medications on file prior to visit.        Past Medical History:   Diagnosis Date    CAD (coronary artery disease)     Hyperlipidemia     Hypertension     S/P colonoscopy sept 2010    BN    Shoulder injury     LEFT & RIGHT /Dr Aron Martins (football)    Torn meniscus     LT /Dr Aron Martins (football)        Past Surgical History:   Procedure Laterality Date    BACK SURGERY Bilateral 2014    SCIATIC NERVE     COLONOSCOPY N/A 09/03/2019    COLONOSCOPY WITH BIOPSY performed by Amarilis Crow MD at [place] [address] N/A 11/01/2022 COLONOSCOPY POLYPECTOMY ABLATION performed by Rola Bourne MD at [address]  2006    x2    ENTEROSCOPY N/A 10/01/2019    PUSH ENTEROSCOPY BIOPSY SMALL BOWEL performed by Rola Bourne MD at [address] Left 07/2019    RING FINGER - TRIGGER    HAND SURGERY  2005,2010    thumb ,nando trigger    HEMORRHOID SURGERY  2010    JOINT REPLACEMENT      KNEE SURGERY  5980,[id number]    RT x 3 one scope & 2 major    TOTAL HIP ARTHROPLASTY Bilateral     Sept 2021    TOTAL KNEE ARTHROPLASTY Right 12/2014    UPPER GASTROINTESTINAL ENDOSCOPY N/A 09/25/2018    EGD BIOPSY GASTRIC FOR H PYLORI performed by Rola Bourne MD at [address] [facility] 12/18/2018    EGD BIOPSY performed by Rola Bourne MD at [id number] [place] [address] 09/03/2019    EGD BIOPSY performed by Rola Bourne MD at [id number] [place] [address] 11/01/2022    EGD BIOPSY performed by Rola Bourne MD at [facility] N/A 12/13/2022    ENTEROSCOPY PUSH BIOPSY performed by Rola Bourne MD at [facility] ENDOSCOPY       No Known Allergies    Social History:  Reviewed. reports that he has never smoked. He has never used smokeless tobacco. He reports that he does not drink alcohol and does not use drugs. Family History:  Reviewed. family history includes Cancer in his mother; Other in his father. Review of System:    Constitutional: No fevers, chills. Eyes: No visual changes or diplopia. No scleral icterus. ENT: No Headaches. No mouth sores or sore throat. Cardiovascular: No for chest pain, No for dyspnea on exertion, No for palpitations or No for loss of consciousness. No cough, hemoptysis, No for pleuritic pain, or phlebitis. Respiratory: No for cough or wheezing. No hematemesis. Gastrointestinal: No abdominal pain, blood in stools.    Genitourinary: No dysuria, or hematuria. Musculoskeletal: No gait disturbance,    Integumentary: No rash or pruritis. Neurological: No headache, change in muscle strength, numbness or tingling. Psychiatric: No anxiety, or depression. Endocrine: No temperature intolerance. No excessive thirst, fluid intake, or urination. Hem/Lymph: No abnormal bruising or bleeding, blood clots or swollen lymph nodes. Allergic/Immunologic: No nasal congestion or hives. Physical Examination:  Vitals:    12/27/22 1605   BP: 122/62   Pulse: 59           Wt Readings from Last 3 Encounters:   12/27/22 259 lb (117.5 kg)   12/14/22 261 lb (118.4 kg)   12/13/22 234 lb (106.1 kg)       Constitutional: Oriented. No distress. Head: Normocephalic and atraumatic. Mouth/Throat: Oropharynx is clear and moist.   Eyes: Conjunctivae clear without jaunduice. PERRL. Neck: Neck supple. No rigidity. No JVD present. Cardiovascular: Normal rate, regular rhythm, S1&S2. Pulmonary/Chest: Bilateral respiratory sounds. No wheezes, No rhonchi. Abdominal: Soft. Bowel sounds present. No distension, No tenderness. Musculoskeletal: No tenderness. No edema    Lymphadenopathy: Has no cervical adenopathy. Neurological: Alert and oriented. Cranial nerve appears intact, No Gross deficit   Skin: Skin is warm and dry. No rash noted. Psychiatric: Has a normal mood, affect and behavior     Labs:  Reviewed. No results for input(s): NA, K, CL, CO2, PHOS, BUN, CREATININE, CA in the last 72 hours. Invalid input(s):  TSH  No results for input(s): WBC, HGB, HCT, MCV, PLT in the last 72 hours.   Lab Results   Component Value Date/Time    CKTOTAL 117 07/31/2012 08:52 AM    TROPONINI <0.01 02/09/2021 01:55 PM     No results found for: BNP  Lab Results   Component Value Date/Time    PROTIME 11.9 02/23/2022 10:57 AM    PROTIME 14.5 09/15/2021 01:00 PM    PROTIME 11.9 05/19/2021 06:55 AM    INR 1.05 02/23/2022 10:57 AM    INR 1.27 09/15/2021 01:00 PM    INR 1.03 05/19/2021 06:55 AM Lab Results   Component Value Date/Time    CHOL 125 04/23/2021 04:37 AM    HDL 45 04/23/2021 04:37 AM    TRIG 68 04/23/2021 04:37 AM       ECG: Personally reviewed: SB, HR 59, , , QTc 437    ECHO: 12/21/2021  Summary  Technically limited study. Definity contrast administered. Left ventricular cavity size is normal with asymmetric hypertrophy of the basal septum. Overall left ventricular systolic function appears normal with an estimated ejection fraction of 50-55%. Diastolic filling parameters suggests normal diastolic function. The aortic root is dilated, measuring 4.4 cm. No evidence of significant valvular stenosis or regurgitation      Stress Test: 3.23.2021  Summary    There is a small fixed perfusion defect at the inferoapical wall of the Left    ventricle consistent with infarction. There is no perfusion defect to    suggest ischemia. The LVEF is reduced at 37% with global mild hypokinesis and moderate    hypokinesis at the inferoapical wall. The TID is normal at 0.99. This is an intermediate risk cardiac scan. Cardiac Angiography: N/A    Problem List:   Patient Active Problem List    Diagnosis Date Noted    NSVT (nonsustained ventricular tachycardia)     CAD in native artery     A-fib ([facility] 04/20/2021    Localized edema 11/14/2017    Hypertension 01/30/2013    Other tenosynovitis of hand and wrist 12/12/2012    Hypertrophy of prostate without urinary obstruction and other lower urinary tract symptoms (LUTS) 03/17/2010    Coronary atherosclerosis 10/05/2007    Other and unspecified hyperlipidemia 10/05/2007        Assessment:   1. Paroxysmal atrial fibrillation (HCC)    2. On dofetilide therapy    3. On continuous oral anticoagulation    4. NICM (nonischemic cardiomyopathy) ([address]    5. Chronic systolic CHF (congestive heart failure) ([facility]    6. NSVT (nonsustained ventricular tachycardia)    7.  Coronary artery disease involving native coronary artery of native heart without angina pectoris         Cardiac HX: Kit Mclaughlin is a 69 y.o. man with a h/o HTN, HLD, NATHAN on CPAP, CAD, s/p PCI (2006), follows with Dr. Lenin Trejo, pAF ongoing since 2007, s/p unsuccessful DCCV (3/16/2021), EF dropped to 45%, MPI showed no reversible ischemia and an EF of 37%, s/p LHC w/ PCI x 2, (3/16/2021, Dr. Lenin Trejo), loaded with dofetilide (4/20/2021), s/p RFA/PVI of AF/tAFL (5/19/2021, Dr. Selina Bills). [id number] 4. TSH 2.37 (2/9/21). pAF  - In SB - HR 45 - asymptomatic  - S/p RFA/PVI of AF and typical AFL  - On Eliquis 5 mg BID - no s/s bleeding - continue  - On dofetilide 500 mcg BID - QTc 444   - On Toprol-XL 25 mg daily  - Upcoming L knee replacement in January  -Follow-up in 6 to 8 months  - ECG ordered and results personally reviewed      CMP/chronic sCHF  - EF 50-55%, improved  - NYHA class I/II  -   - Possibly tachycardia induced  - On Toprol-XL 25 mg daily  - On lisinopril 2.5 mg daily  -Reviewed recent labs     NSVT/CAD  - No VT  - S/p PCI x2  - On plavix and statin  - Follows with Dr. Lenin Trejo     EF of 50-55%. ACEi for systolic HF  ASA and Statin for CAD  Anticoagulation for AF and heart failure  No Tobacco use. All questions and concerns were addressed to the patient/family. Alternatives to my treatment were discussed. The note was completed using EMR. Every effort was made to ensure accuracy; however, inadvertent computerized transcription errors may be present. Patient received education regarding their diagnosis, treatment and medications while in the office today. Daniel Real CNP  [address]    I  have spent 36 minutes in care of the patient including direct face to face time, chart preparation, reviewing diagnostic testing, other provider notes and coordinating patient care.

## 2022-12-22 ENCOUNTER — TELEPHONE (OUTPATIENT)
Dept: CARDIOLOGY CLINIC | Age: 67
End: 2022-12-22

## 2022-12-27 ENCOUNTER — OFFICE VISIT (OUTPATIENT)
Dept: CARDIOLOGY CLINIC | Age: 67
End: 2022-12-27
Payer: COMMERCIAL

## 2022-12-27 VITALS
BODY MASS INDEX: 34.17 KG/M2 | WEIGHT: 259 LBS | SYSTOLIC BLOOD PRESSURE: 122 MMHG | HEART RATE: 59 BPM | DIASTOLIC BLOOD PRESSURE: 62 MMHG

## 2022-12-27 DIAGNOSIS — I47.29 NSVT (NONSUSTAINED VENTRICULAR TACHYCARDIA): ICD-10-CM

## 2022-12-27 DIAGNOSIS — I42.8 NICM (NONISCHEMIC CARDIOMYOPATHY) (HCC): ICD-10-CM

## 2022-12-27 DIAGNOSIS — I25.10 CORONARY ARTERY DISEASE INVOLVING NATIVE CORONARY ARTERY OF NATIVE HEART WITHOUT ANGINA PECTORIS: ICD-10-CM

## 2022-12-27 DIAGNOSIS — I50.22 CHRONIC SYSTOLIC CHF (CONGESTIVE HEART FAILURE) (HCC): ICD-10-CM

## 2022-12-27 DIAGNOSIS — I48.0 PAROXYSMAL ATRIAL FIBRILLATION (HCC): Primary | ICD-10-CM

## 2022-12-27 DIAGNOSIS — Z79.01 ON CONTINUOUS ORAL ANTICOAGULATION: ICD-10-CM

## 2022-12-27 DIAGNOSIS — Z79.899 ON DOFETILIDE THERAPY: ICD-10-CM

## 2022-12-27 PROCEDURE — 1123F ACP DISCUSS/DSCN MKR DOCD: CPT | Performed by: NURSE PRACTITIONER

## 2022-12-27 PROCEDURE — 93000 ELECTROCARDIOGRAM COMPLETE: CPT | Performed by: NURSE PRACTITIONER

## 2022-12-27 PROCEDURE — 3074F SYST BP LT 130 MM HG: CPT | Performed by: NURSE PRACTITIONER

## 2022-12-27 PROCEDURE — 3078F DIAST BP <80 MM HG: CPT | Performed by: NURSE PRACTITIONER

## 2022-12-27 PROCEDURE — 99214 OFFICE O/P EST MOD 30 MIN: CPT | Performed by: NURSE PRACTITIONER

## 2022-12-27 RX ORDER — LISINOPRIL 2.5 MG/1
2.5 TABLET ORAL DAILY
Qty: 90 TABLET | Refills: 3 | Status: SHIPPED | OUTPATIENT
Start: 2022-12-27

## 2022-12-27 RX ORDER — OMEPRAZOLE 10 MG/1
10 CAPSULE, DELAYED RELEASE ORAL DAILY
COMMUNITY

## 2022-12-28 ENCOUNTER — TELEPHONE (OUTPATIENT)
Dept: PRIMARY CARE CLINIC | Age: 67
End: 2022-12-28

## 2022-12-28 ENCOUNTER — OFFICE VISIT (OUTPATIENT)
Dept: PRIMARY CARE CLINIC | Age: 67
End: 2022-12-28
Payer: COMMERCIAL

## 2022-12-28 VITALS
HEART RATE: 69 BPM | SYSTOLIC BLOOD PRESSURE: 122 MMHG | TEMPERATURE: 97 F | DIASTOLIC BLOOD PRESSURE: 68 MMHG | WEIGHT: 253.8 LBS | BODY MASS INDEX: 33.48 KG/M2 | OXYGEN SATURATION: 97 %

## 2022-12-28 DIAGNOSIS — Z95.5 S/P CORONARY ARTERY STENT PLACEMENT: ICD-10-CM

## 2022-12-28 DIAGNOSIS — Z95.820 S/P ANGIOPLASTY WITH STENT: ICD-10-CM

## 2022-12-28 DIAGNOSIS — E78.2 MIXED HYPERLIPIDEMIA: ICD-10-CM

## 2022-12-28 DIAGNOSIS — M17.12 PRIMARY OSTEOARTHRITIS OF LEFT KNEE: ICD-10-CM

## 2022-12-28 DIAGNOSIS — M25.9 KNEE JOINT DISORDER: Primary | ICD-10-CM

## 2022-12-28 DIAGNOSIS — I10 PRIMARY HYPERTENSION: ICD-10-CM

## 2022-12-28 DIAGNOSIS — Z79.01 CHRONIC ANTICOAGULATION: ICD-10-CM

## 2022-12-28 DIAGNOSIS — Z01.818 PREOPERATIVE CLEARANCE: ICD-10-CM

## 2022-12-28 PROCEDURE — 99212 OFFICE O/P EST SF 10 MIN: CPT | Performed by: FAMILY MEDICINE

## 2022-12-28 PROCEDURE — 3078F DIAST BP <80 MM HG: CPT | Performed by: FAMILY MEDICINE

## 2022-12-28 PROCEDURE — 3074F SYST BP LT 130 MM HG: CPT | Performed by: FAMILY MEDICINE

## 2022-12-28 ASSESSMENT — PATIENT HEALTH QUESTIONNAIRE - PHQ9
2. FEELING DOWN, DEPRESSED OR HOPELESS: 0
SUM OF ALL RESPONSES TO PHQ QUESTIONS 1-9: 0
SUM OF ALL RESPONSES TO PHQ9 QUESTIONS 1 & 2: 0
SUM OF ALL RESPONSES TO PHQ QUESTIONS 1-9: 0
SUM OF ALL RESPONSES TO PHQ QUESTIONS 1-9: 0
1. LITTLE INTEREST OR PLEASURE IN DOING THINGS: 0
SUM OF ALL RESPONSES TO PHQ QUESTIONS 1-9: 0

## 2022-12-28 ASSESSMENT — ENCOUNTER SYMPTOMS
COUGH: 0
BACK PAIN: 1
ALLERGIC/IMMUNOLOGIC NEGATIVE: 1
CHEST TIGHTNESS: 0
ROS SKIN COMMENTS: NO DERMATOLOGY
EYES NEGATIVE: 1
ABDOMINAL PAIN: 0
WHEEZING: 0

## 2022-12-28 NOTE — PROGRESS NOTES
SUBJECTIVE:  Patient ID: Felipa Gonzalez is a 69 y.o. male. Chief Complaint:  Chief Complaint   Patient presents with    Pre-op Exam     Total knee replacement on left knee. Dr. Hector Sheffield taking place on 1/13/2023 at [facility] in [employer].             HPI  69year old Male  Left Knee Replacement 1/13/2023      Past Medical History:   Diagnosis Date    CAD (coronary artery disease)     Hyperlipidemia     Hypertension     S/P colonoscopy sept 2010    BN    Shoulder injury     LEFT & RIGHT /Dr Margoth Angel (football)    Torn meniscus     LT /Dr Margoth Angel (football)       Past Surgical History:   Procedure Laterality Date    BACK SURGERY Bilateral 2014    SCIATIC NERVE     COLONOSCOPY N/A 09/03/2019    COLONOSCOPY WITH BIOPSY performed by Claude George MD at [facility] N/A 11/01/2022    COLONOSCOPY POLYPECTOMY ABLATION performed by Claude George MD at [facility]  2006    x2    ENTEROSCOPY N/A 10/01/2019    PUSH ENTEROSCOPY BIOPSY SMALL BOWEL performed by Claude George MD at [address] [place] [address] Left 07/2019    RING FINGER - TRIGGER    HAND SURGERY  2005,2010    thumb ,nando trigger    HEMORRHOID SURGERY  2010    JOINT REPLACEMENT      KNEE SURGERY  [id number]    RT x 3 one scope & 2 major    TOTAL HIP ARTHROPLASTY Bilateral     Sept 2021    TOTAL KNEE ARTHROPLASTY Right 12/2014    UPPER GASTROINTESTINAL ENDOSCOPY N/A 09/25/2018    EGD BIOPSY GASTRIC FOR H PYLORI performed by Claude George MD at [address] [place] [address],[facility] 12/18/2018    EGD BIOPSY performed by Claude George MD at [address] [place] [address],[facility] N/A 09/03/2019    EGD BIOPSY performed by Claude George MD at [address] [place] [address],[facility] 11/01/2022    EGD BIOPSY performed by Claude George MD at [address] [place] [address],[facility] N/A 12/13/2022    ENTEROSCOPY PUSH BIOPSY performed by Niko Perez Wilfrido Tellez MD at [address] ENDOSCOPY       No Known Allergies    Family History   Problem Relation Age of Onset    Cancer Mother         Ovarian ca @69    Other Father         age 79 +respiratory Failure /mine work     Social History     Social History Narrative        New career [employer]    2 girls 28 & 32     Wife FT [employer]  ,Retired [employer]    No tobacco       Patient Active Problem List   Diagnosis    Coronary atherosclerosis    Other and unspecified hyperlipidemia    Hypertrophy of prostate without urinary obstruction and other lower urinary tract symptoms (LUTS)    Other tenosynovitis of hand and wrist    Hypertension    Localized edema    A-fib (HCC)    NSVT (nonsustained ventricular tachycardia)    CAD in native artery     Current Outpatient Medications   Medication Sig Dispense Refill    omeprazole (PRILOSEC) 10 MG delayed release capsule Take 10 mg by mouth daily      lisinopril (PRINIVIL;ZESTRIL) 2.5 MG tablet Take 1 tablet by mouth daily 90 tablet 3    amoxicillin (AMOXIL) 500 MG capsule TAKE 4 CAPSULE (2000MG) BY ORAL ROUTE 3 HRS PRIOR TO DENTAL PROCEDURE      clopidogrel (PLAVIX) 75 MG tablet TAKE 1 TABLET BY MOUTH  DAILY 90 tablet 3    metoprolol succinate (TOPROL XL) 25 MG extended release tablet TAKE 1 TABLET BY MOUTH  DAILY 90 tablet 3    dofetilide (TIKOSYN) 500 MCG capsule TAKE 1 CAPSULE BY MOUTH  EVERY 12 HOURS 180 capsule 3    potassium chloride (KLOR-CON M) 20 MEQ extended release tablet TAKE 1 TABLET BY MOUTH  DAILY 90 tablet 3    rosuvastatin (CRESTOR) 20 MG tablet TAKE 1 TABLET BY MOUTH AT  NIGHT 90 tablet 3    furosemide (LASIX) 80 MG tablet Take 1 tablet by mouth daily 90 tablet 3    tadalafil (CIALIS) 20 MG tablet Take 1 tablet by mouth daily as needed for Erectile Dysfunction 30 tablet 3    ELIQUIS 5 MG TABS tablet TAKE 1 TABLET BY MOUTH  TWICE DAILY 180 tablet 3    clotrimazole-betamethasone (LOTRISONE) 1-0.05 % cream APPLY TOPICALLY 2 TIMES DAILY.  45 g 1 Specialty Vitamins Products (PROSTATE PO) Take 40 mg by mouth 2 times daily      Cranberry 600 MG TABS Take by mouth 3 times daily      tamsulosin (FLOMAX) 0.4 MG capsule Take 0.4 mg by mouth 2 times daily. No current facility-administered medications for this visit. Lab Results   Component Value Date    WBC 6.0 11/11/2022    HGB 13.2 (L) 11/11/2022    HCT 39.8 (L) 11/11/2022    MCV 91.2 11/11/2022     11/11/2022     Lab Results   Component Value Date    CHOL 125 04/23/2021    CHOL 143 03/10/2021    CHOL 191 02/12/2020     Lab Results   Component Value Date    TRIG 68 04/23/2021    TRIG 66 03/10/2021    TRIG 97 02/12/2020     Lab Results   Component Value Date    HDL 45 04/23/2021    HDL 51 03/10/2021    HDL 60 02/12/2020     Lab Results   Component Value Date    LDLCALC 66 04/23/2021    LDLCALC 79 03/10/2021    LDLCALC 112 (H) 02/12/2020     Lab Results   Component Value Date    LABVLDL 14 04/23/2021    LABVLDL 13 03/10/2021    LABVLDL 19 02/12/2020     Lab Results   Component Value Date    CHOLHDLRATIO 3.0 02/13/2019    CHOLHDLRATIO 2.6 09/07/2017    CHOLHDLRATIO 2.7 01/26/2016       Chemistry        Component Value Date/Time     10/07/2022 0840    K 4.1 10/07/2022 0840    K 4.3 04/24/2021 0449     10/07/2022 0840    CO2 29 10/07/2022 0840    BUN 15 10/07/2022 0840    CREATININE 0.8 10/07/2022 0840        Component Value Date/Time    CALCIUM 9.1 10/07/2022 0840    ALKPHOS 76 10/07/2022 0840    AST 19 10/07/2022 0840    ALT 16 10/07/2022 0840    BILITOT 0.3 10/07/2022 0840            Review of Systems   Constitutional:         Good Health  [employer]    HENT: Negative. Eyes: Negative. Respiratory:  Negative for cough, chest tightness and wheezing. Sleep Apnea  Bipap was d/c  [facility]   Cardiovascular:  Negative for chest pain, palpitations and leg swelling.         Cardiology Dr Lui Morris  +CNP  Stent x 3  Cardiac ablation  Rx Plavix  Rx Eliquis Gastrointestinal:  Negative for abdominal pain. BM regular  Recent EGD & Colonoscopy  12/13/2022  Dr Debra Cook   Endocrine: Negative. Genitourinary:  Negative for dysuria and flank pain. Rx Flomax   Musculoskeletal:  Positive for back pain. Negative for gait problem and neck pain. SP Hip Replacement Bilateral  SP Right Knee replacement   SP Shoulder repair Bilateral  SP Back surgery x 2   Skin:         No dermatology    Allergic/Immunologic: Negative. Negative for environmental allergies. Neurological: Negative. Negative for dizziness, light-headedness and headaches. Hematological: Negative. Psychiatric/Behavioral: Negative. Negative for behavioral problems, self-injury, sleep disturbance and suicidal ideas. The patient is not nervous/anxious and is not hyperactive. Good support system  Wife & 2 daughter      OBJECTIVE:  /68 (Site: Left Upper Arm, Position: Sitting, Cuff Size: Medium Adult)   Pulse 69   Temp 97 °F (36.1 °C) (Temporal)   Wt 253 lb 12.8 oz (115.1 kg)   SpO2 97%   BMI 33.48 kg/m²   Physical Exam  Constitutional:       Comments: BMI 33   HENT:      Head: Normocephalic and atraumatic. Ears:      Comments: Some cerumen bilateral  Eyes:      Extraocular Movements: Extraocular movements intact. Pupils: Pupils are equal, round, and reactive to light. Cardiovascular:      Rate and Rhythm: Regular rhythm. Pulses: Normal pulses. Heart sounds: Normal heart sounds. Pulmonary:      Effort: Pulmonary effort is normal.      Breath sounds: Normal breath sounds. No wheezing or rhonchi. Abdominal:      Palpations: Abdomen is soft. Musculoskeletal:      Cervical back: Normal range of motion and neck supple. Right lower leg: No edema. Left lower leg: No edema. Comments: SP Patrick Replacement Bilateral  SP Right Knee Replacement  SP shoulder surgery Bilateral   Skin:     Findings: No rash.    Neurological:      General: No focal deficit present. Mental Status: He is alert and oriented to person, place, and time. Psychiatric:         Mood and Affect: Mood normal.         Behavior: Behavior normal.         Thought Content: Thought content normal.         Judgment: Judgment normal.       ASSESSMENT/PLAN:      Diagnosis Orders   1. Knee joint disorder        2. Primary osteoarthritis of left knee        3. S/P coronary artery stent placement        4. S/P angioplasty with stent        5. Primary hypertension        6. Mixed hyperlipidemia        7. Chronic anticoagulation        8.  Preoperative clearance              OK for Planned Surgery by Dr Kailee Nixon Left Knee Replacement  Surgery was explained by surgeon  Pt is informed by surgeon   possible risk & complications  DVT prophylaxis as per surgeon   Cardiology will notify Pt when d/c Plavix & Eliquis  Cardiology Clearance done by Cardiology team  Covid vaccine [facility]  Flu shot done [facility]

## 2022-12-28 NOTE — TELEPHONE ENCOUNTER
Valerio Harris from provider office returned call from [place] and stated that the patient have the surgery on 01/13/2023. So she requested to fax the following documents. Pre op visit summary. Physical, EKG whatever done currently.     Her fax--[phone]    Her call back no--[phone]    Please review    Thanks

## 2023-01-06 ENCOUNTER — TELEPHONE (OUTPATIENT)
Dept: CARDIOLOGY CLINIC | Age: 68
End: 2023-01-06

## 2023-01-06 NOTE — TELEPHONE ENCOUNTER
Patient is calling in because he is having a left knee total replacement done on 1/13/23 and needs to know how long to hold his Eliquis and Plavix.  Please advise [phone]

## 2023-01-10 ENCOUNTER — TELEPHONE (OUTPATIENT)
Dept: PRIMARY CARE CLINIC | Age: 68
End: 2023-01-10

## 2023-01-10 LAB
ANION GAP SERPL CALCULATED.3IONS-SCNC: 13 MMOL/L (ref 3–16)
APTT: 34.1 SEC (ref 23–34.3)
BASOPHILS ABSOLUTE: 0 K/UL (ref 0–0.2)
BASOPHILS RELATIVE PERCENT: 0.4 %
BUN BLDV-MCNC: 13 MG/DL (ref 7–20)
CALCIUM SERPL-MCNC: 9.3 MG/DL (ref 8.3–10.6)
CHLORIDE BLD-SCNC: 101 MMOL/L (ref 99–110)
CO2: 23 MMOL/L (ref 21–32)
CREAT SERPL-MCNC: 0.8 MG/DL (ref 0.8–1.3)
EOSINOPHILS ABSOLUTE: 0.1 K/UL (ref 0–0.6)
EOSINOPHILS RELATIVE PERCENT: 2.2 %
GFR SERPL CREATININE-BSD FRML MDRD: >60 ML/MIN/{1.73_M2}
GLUCOSE BLD-MCNC: 81 MG/DL (ref 70–99)
HCT VFR BLD CALC: 39.5 % (ref 40.5–52.5)
HEMOGLOBIN: 13.2 G/DL (ref 13.5–17.5)
INR BLD: 1.06 (ref 0.87–1.14)
LYMPHOCYTES ABSOLUTE: 1.3 K/UL (ref 1–5.1)
LYMPHOCYTES RELATIVE PERCENT: 24.6 %
MCH RBC QN AUTO: 30.3 PG (ref 26–34)
MCHC RBC AUTO-ENTMCNC: 33.4 G/DL (ref 31–36)
MCV RBC AUTO: 90.7 FL (ref 80–100)
MONOCYTES ABSOLUTE: 0.4 K/UL (ref 0–1.3)
MONOCYTES RELATIVE PERCENT: 7.5 %
NEUTROPHILS ABSOLUTE: 3.4 K/UL (ref 1.7–7.7)
NEUTROPHILS RELATIVE PERCENT: 65.3 %
PDW BLD-RTO: 13.5 % (ref 12.4–15.4)
PLATELET # BLD: 183 K/UL (ref 135–450)
PMV BLD AUTO: 9.5 FL (ref 5–10.5)
POTASSIUM SERPL-SCNC: 4.4 MMOL/L (ref 3.5–5.1)
PROTHROMBIN TIME: 13.8 SEC (ref 11.7–14.5)
RBC # BLD: 4.35 M/UL (ref 4.2–5.9)
SODIUM BLD-SCNC: 137 MMOL/L (ref 136–145)
WBC # BLD: 5.3 K/UL (ref 4–11)

## 2023-01-10 NOTE — TELEPHONE ENCOUNTER
Pt dropped off a pre op exam approval form that needs to be filled out  and signed. Pls call pt when completed.

## 2023-01-10 NOTE — TELEPHONE ENCOUNTER
Spoke with patient, let him know that this form does not need to be filled out. faxed his Preop note, EKG and other labs to Dr. Beverly Carey office for him. Patient verbalized understanding and said he is coming in to get labs completed.

## 2023-01-11 LAB
ESTIMATED AVERAGE GLUCOSE: 102.5 MG/DL
HBA1C MFR BLD: 5.2 %

## 2023-01-11 NOTE — TELEPHONE ENCOUNTER
Requested Prescriptions     Pending Prescriptions Disp Refills    apixaban (ELIQUIS) 5 MG TABS tablet 180 tablet 3     Sig: TAKE 1 TABLET BY MOUTH  TWICE DAILY          Last Office Visit: 12/27/2022     Next Office Visit: 5/31/2023

## 2023-03-29 RX ORDER — FUROSEMIDE 80 MG
80 TABLET ORAL DAILY
Qty: 90 TABLET | Refills: 3 | Status: SHIPPED | OUTPATIENT
Start: 2023-03-29

## 2023-04-05 DIAGNOSIS — R21 RASH: ICD-10-CM

## 2023-04-05 RX ORDER — CLOTRIMAZOLE AND BETAMETHASONE DIPROPIONATE 10; .64 MG/G; MG/G
CREAM TOPICAL
Qty: 90 G | Refills: 1 | Status: SHIPPED | OUTPATIENT
Start: 2023-04-05

## 2023-06-01 ENCOUNTER — TELEPHONE (OUTPATIENT)
Dept: CARDIOLOGY CLINIC | Age: 68
End: 2023-06-01

## 2023-06-01 DIAGNOSIS — Z79.899 ON DOFETILIDE THERAPY: Primary | ICD-10-CM

## 2023-06-01 RX ORDER — DOFETILIDE 0.5 MG/1
500 CAPSULE ORAL EVERY 12 HOURS SCHEDULED
Qty: 60 CAPSULE | Refills: 0 | Status: SHIPPED | OUTPATIENT
Start: 2023-06-01

## 2023-06-01 NOTE — TELEPHONE ENCOUNTER
The patient is calling to request a 30 day supply of:    dofetilide (TIKOSYN) 500 MCG capsule [[id number]]     Order Details  Dose: 500 mcg Route: Oral Frequency: EVERY 12 HOURS SCHEDULED   Dispense Quantity: 180 capsule Refills: 3    Note to Pharmacy: Requesting 1 year supply         Sig: TAKE 1 CAPSULE BY MOUTH  EVERY 12 HOURS   Sent to [facility]ACY [facility][id number] [place], [address] [place] [address] [phone] Ezra Wei [phone]    Last refill: 7/11/22  Last office visit: 12/27/22  Next office visit: 6/7/23  Last labs: 1/10/23

## 2023-06-17 DIAGNOSIS — E78.2 MIXED HYPERLIPIDEMIA: ICD-10-CM

## 2023-06-17 DIAGNOSIS — I10 ESSENTIAL HYPERTENSION: ICD-10-CM

## 2023-06-17 DIAGNOSIS — I25.10 CORONARY ARTERY DISEASE INVOLVING NATIVE CORONARY ARTERY OF NATIVE HEART WITHOUT ANGINA PECTORIS: ICD-10-CM

## 2023-06-19 RX ORDER — POTASSIUM CHLORIDE 20 MEQ/1
20 TABLET, EXTENDED RELEASE ORAL DAILY
Qty: 90 TABLET | Refills: 3 | Status: SHIPPED | OUTPATIENT
Start: 2023-06-19

## 2023-06-19 RX ORDER — ROSUVASTATIN CALCIUM 20 MG/1
TABLET, COATED ORAL
Qty: 90 TABLET | Refills: 3 | Status: SHIPPED | OUTPATIENT
Start: 2023-06-19

## 2023-06-28 RX ORDER — METOPROLOL SUCCINATE 25 MG/1
25 TABLET, EXTENDED RELEASE ORAL DAILY
Qty: 90 TABLET | Refills: 3 | Status: SHIPPED | OUTPATIENT
Start: 2023-06-28

## 2023-06-28 RX ORDER — CLOPIDOGREL BISULFATE 75 MG/1
TABLET ORAL
Qty: 90 TABLET | Refills: 3 | Status: SHIPPED | OUTPATIENT
Start: 2023-06-28

## 2023-07-06 RX ORDER — DOFETILIDE 0.5 MG/1
500 CAPSULE ORAL EVERY 12 HOURS SCHEDULED
Qty: 180 CAPSULE | Refills: 3 | Status: SHIPPED | OUTPATIENT
Start: 2023-07-06

## 2023-07-06 NOTE — TELEPHONE ENCOUNTER
Requested Prescriptions     Pending Prescriptions Disp Refills    dofetilide (TIKOSYN) 500 MCG capsule [Pharmacy Med Name: DOFETILIDE  500MCG  CAP] 180 capsule 3     Sig: TAKE 1 CAPSULE BY MOUTH  EVERY 12 HOURS            Last Office Visit: 12/27/2022     Next Office Visit: 7/7/2023

## 2023-07-24 ENCOUNTER — OFFICE VISIT (OUTPATIENT)
Dept: CARDIOLOGY CLINIC | Age: 68
End: 2023-07-24
Payer: COMMERCIAL

## 2023-07-24 VITALS
BODY MASS INDEX: 33.43 KG/M2 | HEART RATE: 80 BPM | DIASTOLIC BLOOD PRESSURE: 72 MMHG | WEIGHT: 253.4 LBS | SYSTOLIC BLOOD PRESSURE: 126 MMHG

## 2023-07-24 DIAGNOSIS — I48.0 PAROXYSMAL ATRIAL FIBRILLATION (HCC): Primary | ICD-10-CM

## 2023-07-24 DIAGNOSIS — I25.10 ATHEROSCLEROSIS OF NATIVE CORONARY ARTERY OF NATIVE HEART WITHOUT ANGINA PECTORIS: ICD-10-CM

## 2023-07-24 DIAGNOSIS — I10 HTN (HYPERTENSION), BENIGN: ICD-10-CM

## 2023-07-24 DIAGNOSIS — E78.5 HYPERLIPIDEMIA, UNSPECIFIED HYPERLIPIDEMIA TYPE: ICD-10-CM

## 2023-07-24 PROBLEM — I42.8 NICM (NONISCHEMIC CARDIOMYOPATHY) (HCC): Status: ACTIVE | Noted: 2023-07-24

## 2023-07-24 PROCEDURE — 3078F DIAST BP <80 MM HG: CPT | Performed by: INTERNAL MEDICINE

## 2023-07-24 PROCEDURE — 3074F SYST BP LT 130 MM HG: CPT | Performed by: INTERNAL MEDICINE

## 2023-07-24 PROCEDURE — 1123F ACP DISCUSS/DSCN MKR DOCD: CPT | Performed by: INTERNAL MEDICINE

## 2023-07-24 PROCEDURE — 99214 OFFICE O/P EST MOD 30 MIN: CPT | Performed by: INTERNAL MEDICINE

## 2023-07-24 RX ORDER — TADALAFIL 20 MG/1
20 TABLET ORAL DAILY PRN
Qty: 30 TABLET | Refills: 3 | Status: SHIPPED | OUTPATIENT
Start: 2023-07-24

## 2023-07-24 NOTE — PROGRESS NOTES
Subjective:      Patient ID: Mary Sorensen is a 69 y.o. male. CC:  Follow up CAD htn and new onset AF. HPI:   Mr. Maxwell Holstein is doing well overall. No chest pain. Wants to get active. Overall he feels well and was started on eliquis. ECG shows SR after R PDA stenting and then AF ablation. 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 Patient  has a past medical history of Torn meniscus; S/P colonoscopy; and Shoulder injury. Current Outpatient Prescriptions   Medication Sig Dispense Refill    ibuprofen (ADVIL;MOTRIN) 800 MG tablet TAKE 1 TABLET BY MOUTH EVERY 8 HOURS FOR PAIN  90 tablet  0    simvastatin (ZOCOR) 80 MG tablet TAKE 1 TABLET BY MOUTH NIGHTLY  30 tablet  6    atovaquone-proguanil (MALARONE) 250-100 MG per tablet Take 1 tablet by mouth daily. 21 tablet  0    clopidogrel (PLAVIX) 75 MG tablet TAKE 1 TABLET BY MOUTH DAILY. 30 tablet  6    metoprolol (TOPROL-XL) 50 MG XL tablet TAKE 1 TABLET BY MOUTH DAILY. 30 tablet  6    aspirin 81 MG EC tablet Take 81 mg by mouth daily. No current facility-administered medications for this visit. Vitals  Weight: 280 lb (127.007 kg)  Blood Pressure:  104/62  Pulse: 76     Review of Systems   Constitutional: Negative. HENT: Negative. Eyes: Negative. Respiratory: Negative. Cardiovascular: Negative. Gastrointestinal: Negative. Genitourinary: Negative. Musculoskeletal: Negative. Neurological: Negative. Hematological: Negative.

## 2023-08-29 ENCOUNTER — OFFICE VISIT (OUTPATIENT)
Dept: CARDIOLOGY CLINIC | Age: 68
End: 2023-08-29
Payer: COMMERCIAL

## 2023-08-29 VITALS
WEIGHT: 255.2 LBS | HEART RATE: 82 BPM | SYSTOLIC BLOOD PRESSURE: 110 MMHG | BODY MASS INDEX: 33.67 KG/M2 | DIASTOLIC BLOOD PRESSURE: 70 MMHG

## 2023-08-29 DIAGNOSIS — Z79.899 ON DOFETILIDE THERAPY: ICD-10-CM

## 2023-08-29 DIAGNOSIS — I42.8 NICM (NONISCHEMIC CARDIOMYOPATHY) (HCC): ICD-10-CM

## 2023-08-29 DIAGNOSIS — Z99.89 OSA ON CPAP: ICD-10-CM

## 2023-08-29 DIAGNOSIS — I10 HTN (HYPERTENSION), BENIGN: ICD-10-CM

## 2023-08-29 DIAGNOSIS — I47.29 NSVT (NONSUSTAINED VENTRICULAR TACHYCARDIA) (HCC): ICD-10-CM

## 2023-08-29 DIAGNOSIS — I25.10 CORONARY ARTERY DISEASE INVOLVING NATIVE CORONARY ARTERY OF NATIVE HEART WITHOUT ANGINA PECTORIS: ICD-10-CM

## 2023-08-29 DIAGNOSIS — I48.11 LONGSTANDING PERSISTENT ATRIAL FIBRILLATION (HCC): Primary | ICD-10-CM

## 2023-08-29 DIAGNOSIS — G47.33 OSA ON CPAP: ICD-10-CM

## 2023-08-29 PROCEDURE — 3078F DIAST BP <80 MM HG: CPT | Performed by: INTERNAL MEDICINE

## 2023-08-29 PROCEDURE — 99214 OFFICE O/P EST MOD 30 MIN: CPT | Performed by: INTERNAL MEDICINE

## 2023-08-29 PROCEDURE — 93000 ELECTROCARDIOGRAM COMPLETE: CPT | Performed by: INTERNAL MEDICINE

## 2023-08-29 PROCEDURE — 3074F SYST BP LT 130 MM HG: CPT | Performed by: INTERNAL MEDICINE

## 2023-08-29 PROCEDURE — 1123F ACP DISCUSS/DSCN MKR DOCD: CPT | Performed by: INTERNAL MEDICINE

## 2023-09-01 RX ORDER — DOFETILIDE 0.5 MG/1
500 CAPSULE ORAL EVERY 12 HOURS SCHEDULED
Qty: 60 CAPSULE | Refills: 5 | Status: SHIPPED | OUTPATIENT
Start: 2023-09-01

## 2023-11-29 RX ORDER — LISINOPRIL 2.5 MG/1
2.5 TABLET ORAL DAILY
Qty: 90 TABLET | Refills: 3 | Status: SHIPPED | OUTPATIENT
Start: 2023-11-29

## 2023-11-29 NOTE — TELEPHONE ENCOUNTER
Requested Prescriptions     Pending Prescriptions Disp Refills    lisinopril (PRINIVIL;ZESTRIL) 2.5 MG tablet 90 tablet 3     Sig: Take 1 tablet by mouth daily          Last Office Visit: 8/29/2023     Next Office Visit: 1/29/2024

## 2023-12-14 NOTE — TELEPHONE ENCOUNTER
Requested Prescriptions     Pending Prescriptions Disp Refills    apixaban (ELIQUIS) 5 MG TABS tablet [Pharmacy Med Name: Eliquis 5 MG Oral Tablet] 180 tablet 3     Sig: TAKE 1 TABLET BY MOUTH TWICE  DAILY          Last Office Visit: 8/29/2023     Next Office Visit: 1/29/2024

## 2024-01-18 NOTE — PROGRESS NOTES
dofetilide 500 mcg BID.  His QTc is 413.  He has not had any recent labs.  His blood pressure is well-controlled in the office today.  He does have chronic lower extremity edema which is managed by his nephrologist.  He is on Lasix 80 mg daily.  He denies any chest pain, shortness of breath, PND orthopnea.      Home medications:   Current Outpatient Medications on File Prior to Visit   Medication Sig Dispense Refill    apixaban (ELIQUIS) 5 MG TABS tablet TAKE 1 TABLET BY MOUTH TWICE  DAILY 180 tablet 3    lisinopril (PRINIVIL;ZESTRIL) 2.5 MG tablet Take 1 tablet by mouth daily 90 tablet 3    dofetilide (TIKOSYN) 500 MCG capsule TAKE 1 CAPSULE BY MOUTH EVERY 12 HOURS 60 capsule 5    tadalafil (CIALIS) 20 MG tablet Take 1 tablet by mouth daily as needed for Erectile Dysfunction 30 tablet 3    metoprolol succinate (TOPROL XL) 25 MG extended release tablet TAKE 1 TABLET BY MOUTH  DAILY 90 tablet 3    clopidogrel (PLAVIX) 75 MG tablet TAKE 1 TABLET BY MOUTH  DAILY 90 tablet 3    potassium chloride (KLOR-CON M) 20 MEQ extended release tablet TAKE 1 TABLET BY MOUTH  DAILY 90 tablet 3    rosuvastatin (CRESTOR) 20 MG tablet TAKE 1 TABLET BY MOUTH AT  NIGHT 90 tablet 3    furosemide (LASIX) 80 MG tablet TAKE 1 TABLET BY MOUTH  DAILY 90 tablet 3    omeprazole (PRILOSEC) 10 MG delayed release capsule Take 1 capsule by mouth daily      amoxicillin (AMOXIL) 500 MG capsule TAKE 4 CAPSULE (2000MG) BY ORAL ROUTE 3 HRS PRIOR TO DENTAL PROCEDURE      Specialty Vitamins Products (PROSTATE PO) Take 40 mg by mouth 2 times daily      Cranberry 600 MG TABS Take by mouth daily (with breakfast)      tamsulosin (FLOMAX) 0.4 MG capsule Take 1 capsule by mouth in the morning and 1 capsule in the evening.       No current facility-administered medications on file prior to visit.       Past Medical History:   Diagnosis Date    CAD (coronary artery disease)     Hyperlipidemia     Hypertension     S/P colonoscopy sept 2010    BN    Shoulder injury

## 2024-02-13 ENCOUNTER — OFFICE VISIT (OUTPATIENT)
Dept: CARDIOLOGY CLINIC | Age: 69
End: 2024-02-13
Payer: COMMERCIAL

## 2024-02-13 VITALS
HEART RATE: 74 BPM | DIASTOLIC BLOOD PRESSURE: 72 MMHG | WEIGHT: 266.2 LBS | BODY MASS INDEX: 35.12 KG/M2 | SYSTOLIC BLOOD PRESSURE: 124 MMHG

## 2024-02-13 DIAGNOSIS — I42.8 NICM (NONISCHEMIC CARDIOMYOPATHY) (HCC): ICD-10-CM

## 2024-02-13 DIAGNOSIS — I10 ESSENTIAL HYPERTENSION: ICD-10-CM

## 2024-02-13 DIAGNOSIS — I48.0 PAROXYSMAL ATRIAL FIBRILLATION (HCC): ICD-10-CM

## 2024-02-13 DIAGNOSIS — Z79.01 ON CONTINUOUS ORAL ANTICOAGULATION: ICD-10-CM

## 2024-02-13 DIAGNOSIS — I25.10 CORONARY ARTERY DISEASE INVOLVING NATIVE CORONARY ARTERY OF NATIVE HEART WITHOUT ANGINA PECTORIS: ICD-10-CM

## 2024-02-13 DIAGNOSIS — I48.3 TYPICAL ATRIAL FLUTTER (HCC): ICD-10-CM

## 2024-02-13 DIAGNOSIS — Z79.899 ENCOUNTER FOR MONITORING DOFETILIDE THERAPY: ICD-10-CM

## 2024-02-13 DIAGNOSIS — Z51.81 ENCOUNTER FOR MONITORING DOFETILIDE THERAPY: ICD-10-CM

## 2024-02-13 DIAGNOSIS — I48.0 PAROXYSMAL ATRIAL FIBRILLATION (HCC): Primary | ICD-10-CM

## 2024-02-13 DIAGNOSIS — I50.22 CHRONIC SYSTOLIC CHF (CONGESTIVE HEART FAILURE) (HCC): ICD-10-CM

## 2024-02-13 LAB
ANION GAP SERPL CALCULATED.3IONS-SCNC: 10 MMOL/L (ref 3–16)
BUN SERPL-MCNC: 16 MG/DL (ref 7–20)
CALCIUM SERPL-MCNC: 9 MG/DL (ref 8.3–10.6)
CHLORIDE SERPL-SCNC: 100 MMOL/L (ref 99–110)
CO2 SERPL-SCNC: 30 MMOL/L (ref 21–32)
CREAT SERPL-MCNC: 0.8 MG/DL (ref 0.8–1.3)
GFR SERPLBLD CREATININE-BSD FMLA CKD-EPI: >60 ML/MIN/{1.73_M2}
GLUCOSE SERPL-MCNC: 88 MG/DL (ref 70–99)
MAGNESIUM SERPL-MCNC: 2.1 MG/DL (ref 1.8–2.4)
POTASSIUM SERPL-SCNC: 4.1 MMOL/L (ref 3.5–5.1)
SODIUM SERPL-SCNC: 140 MMOL/L (ref 136–145)

## 2024-02-13 PROCEDURE — 99215 OFFICE O/P EST HI 40 MIN: CPT | Performed by: NURSE PRACTITIONER

## 2024-02-13 PROCEDURE — 93000 ELECTROCARDIOGRAM COMPLETE: CPT | Performed by: NURSE PRACTITIONER

## 2024-02-13 PROCEDURE — 1123F ACP DISCUSS/DSCN MKR DOCD: CPT | Performed by: NURSE PRACTITIONER

## 2024-02-13 PROCEDURE — 3074F SYST BP LT 130 MM HG: CPT | Performed by: NURSE PRACTITIONER

## 2024-02-13 PROCEDURE — 3078F DIAST BP <80 MM HG: CPT | Performed by: NURSE PRACTITIONER

## 2024-02-20 ENCOUNTER — PROCEDURE VISIT (OUTPATIENT)
Dept: CARDIOLOGY CLINIC | Age: 69
End: 2024-02-20
Payer: COMMERCIAL

## 2024-02-20 DIAGNOSIS — I42.8 NICM (NONISCHEMIC CARDIOMYOPATHY) (HCC): ICD-10-CM

## 2024-02-20 DIAGNOSIS — I48.0 PAROXYSMAL ATRIAL FIBRILLATION (HCC): ICD-10-CM

## 2024-02-20 PROCEDURE — 93306 TTE W/DOPPLER COMPLETE: CPT | Performed by: INTERNAL MEDICINE

## 2024-02-21 DIAGNOSIS — Z79.899 ON DOFETILIDE THERAPY: ICD-10-CM

## 2024-02-21 DIAGNOSIS — D64.9 ANEMIA, UNSPECIFIED TYPE: ICD-10-CM

## 2024-02-21 LAB
ANION GAP SERPL CALCULATED.3IONS-SCNC: 8 MMOL/L (ref 3–16)
BASOPHILS # BLD: 0 K/UL (ref 0–0.2)
BASOPHILS NFR BLD: 0.3 %
BUN SERPL-MCNC: 15 MG/DL (ref 7–20)
CALCIUM SERPL-MCNC: 9 MG/DL (ref 8.3–10.6)
CHLORIDE SERPL-SCNC: 97 MMOL/L (ref 99–110)
CO2 SERPL-SCNC: 30 MMOL/L (ref 21–32)
CREAT SERPL-MCNC: 0.9 MG/DL (ref 0.8–1.3)
DEPRECATED RDW RBC AUTO: 13.7 % (ref 12.4–15.4)
EOSINOPHIL # BLD: 0.1 K/UL (ref 0–0.6)
EOSINOPHIL NFR BLD: 2 %
GFR SERPLBLD CREATININE-BSD FMLA CKD-EPI: >60 ML/MIN/{1.73_M2}
GLUCOSE SERPL-MCNC: 93 MG/DL (ref 70–99)
HCT VFR BLD AUTO: 39.3 % (ref 40.5–52.5)
HGB BLD-MCNC: 13.4 G/DL (ref 13.5–17.5)
LYMPHOCYTES # BLD: 1 K/UL (ref 1–5.1)
LYMPHOCYTES NFR BLD: 16.6 %
MAGNESIUM SERPL-MCNC: 2.2 MG/DL (ref 1.8–2.4)
MCH RBC QN AUTO: 30.4 PG (ref 26–34)
MCHC RBC AUTO-ENTMCNC: 33.9 G/DL (ref 31–36)
MCV RBC AUTO: 89.5 FL (ref 80–100)
MONOCYTES # BLD: 0.6 K/UL (ref 0–1.3)
MONOCYTES NFR BLD: 10 %
NEUTROPHILS # BLD: 4.2 K/UL (ref 1.7–7.7)
NEUTROPHILS NFR BLD: 71.1 %
PLATELET # BLD AUTO: 178 K/UL (ref 135–450)
PMV BLD AUTO: 9.1 FL (ref 5–10.5)
POTASSIUM SERPL-SCNC: 4.5 MMOL/L (ref 3.5–5.1)
RBC # BLD AUTO: 4.39 M/UL (ref 4.2–5.9)
SODIUM SERPL-SCNC: 135 MMOL/L (ref 136–145)
WBC # BLD AUTO: 5.9 K/UL (ref 4–11)

## 2024-04-22 ENCOUNTER — HOSPITAL ENCOUNTER (OUTPATIENT)
Dept: GENERAL RADIOLOGY | Age: 69
Discharge: HOME | End: 2024-04-22
Payer: COMMERCIAL

## 2024-04-22 ENCOUNTER — OFFICE VISIT (OUTPATIENT)
Dept: PRIMARY CARE CLINIC | Age: 69
End: 2024-04-22
Payer: COMMERCIAL

## 2024-04-22 VITALS
WEIGHT: 267 LBS | DIASTOLIC BLOOD PRESSURE: 80 MMHG | RESPIRATION RATE: 16 BRPM | SYSTOLIC BLOOD PRESSURE: 122 MMHG | TEMPERATURE: 97.8 F | OXYGEN SATURATION: 99 % | BODY MASS INDEX: 35.39 KG/M2 | HEIGHT: 73 IN | HEART RATE: 88 BPM

## 2024-04-22 DIAGNOSIS — M79.644 FINGER PAIN, RIGHT: ICD-10-CM

## 2024-04-22 DIAGNOSIS — M79.644 FINGER PAIN, RIGHT: Primary | ICD-10-CM

## 2024-04-22 DIAGNOSIS — M79.89 FINGER SWELLING: ICD-10-CM

## 2024-04-22 LAB
DEPRECATED RDW RBC AUTO: 13.8 % (ref 12.4–15.4)
HCT VFR BLD AUTO: 38.8 % (ref 40.5–52.5)
HGB BLD-MCNC: 13.3 G/DL (ref 13.5–17.5)
MCH RBC QN AUTO: 30.4 PG (ref 26–34)
MCHC RBC AUTO-ENTMCNC: 34.3 G/DL (ref 31–36)
MCV RBC AUTO: 88.7 FL (ref 80–100)
PLATELET # BLD AUTO: 195 K/UL (ref 135–450)
PMV BLD AUTO: 8.9 FL (ref 5–10.5)
RBC # BLD AUTO: 4.37 M/UL (ref 4.2–5.9)
URATE SERPL-MCNC: 6.1 MG/DL (ref 3.5–7.2)
WBC # BLD AUTO: 6 K/UL (ref 4–11)

## 2024-04-22 PROCEDURE — 73130 X-RAY EXAM OF HAND: CPT

## 2024-04-22 PROCEDURE — 3079F DIAST BP 80-89 MM HG: CPT | Performed by: NURSE PRACTITIONER

## 2024-04-22 PROCEDURE — 99214 OFFICE O/P EST MOD 30 MIN: CPT | Performed by: NURSE PRACTITIONER

## 2024-04-22 PROCEDURE — 1123F ACP DISCUSS/DSCN MKR DOCD: CPT | Performed by: NURSE PRACTITIONER

## 2024-04-22 PROCEDURE — 3074F SYST BP LT 130 MM HG: CPT | Performed by: NURSE PRACTITIONER

## 2024-04-22 RX ORDER — METHYLPREDNISOLONE 4 MG/1
TABLET ORAL
Qty: 1 KIT | Refills: 0 | Status: SHIPPED | OUTPATIENT
Start: 2024-04-22

## 2024-04-22 SDOH — ECONOMIC STABILITY: FOOD INSECURITY: WITHIN THE PAST 12 MONTHS, THE FOOD YOU BOUGHT JUST DIDN'T LAST AND YOU DIDN'T HAVE MONEY TO GET MORE.: NEVER TRUE

## 2024-04-22 SDOH — ECONOMIC STABILITY: FOOD INSECURITY: WITHIN THE PAST 12 MONTHS, YOU WORRIED THAT YOUR FOOD WOULD RUN OUT BEFORE YOU GOT MONEY TO BUY MORE.: NEVER TRUE

## 2024-04-22 SDOH — ECONOMIC STABILITY: INCOME INSECURITY: HOW HARD IS IT FOR YOU TO PAY FOR THE VERY BASICS LIKE FOOD, HOUSING, MEDICAL CARE, AND HEATING?: NOT HARD AT ALL

## 2024-04-22 ASSESSMENT — ENCOUNTER SYMPTOMS
COUGH: 0
VOMITING: 0
NAUSEA: 0
DIARRHEA: 0
WHEEZING: 0
SHORTNESS OF BREATH: 0

## 2024-04-22 ASSESSMENT — PATIENT HEALTH QUESTIONNAIRE - PHQ9
1. LITTLE INTEREST OR PLEASURE IN DOING THINGS: NOT AT ALL
SUM OF ALL RESPONSES TO PHQ QUESTIONS 1-9: 0
SUM OF ALL RESPONSES TO PHQ QUESTIONS 1-9: 0
2. FEELING DOWN, DEPRESSED OR HOPELESS: NOT AT ALL
SUM OF ALL RESPONSES TO PHQ QUESTIONS 1-9: 0
SUM OF ALL RESPONSES TO PHQ9 QUESTIONS 1 & 2: 0
SUM OF ALL RESPONSES TO PHQ QUESTIONS 1-9: 0

## 2024-04-22 NOTE — PROGRESS NOTES
ENCOUNTER DATE: 4/22/2024     NAME: Cem Gentile   AGE: 68 y.o.   GENDER: male   YOB: 1955    Chief Complaint   Patient presents with    Other     RT pointer finger is swollen started yesterday morning but has started to get uncomfortable        ASSESSMENT/PLAN:  1. Finger pain, right  Discussed differentials including gout versus cellulitis  Check x-ray today  Check labs  Start on oral steroids.  Will avoid colchicine due to drug interactions  If WBC count is elevated, may start on antibiotics  - XR HAND RIGHT (MIN 3 VIEWS); Future  - methylPREDNISolone (MEDROL, ABRAHAM,) 4 MG tablet; Take by mouth per package instruction  Dispense: 1 kit; Refill: 0  - CBC; Future  - Uric Acid; Future    2. Finger swelling  - CBC; Future  - Uric Acid; Future      Return if symptoms worsen or fail to improve.     HPI:   Patient is here for problem visit    Complains of right finger pain.   Noticed right knuckle swelling x few months.   About 2 weeks ago, noticed swelling and soreness in his finger.   Now is sore to touch, swelling worsened. Now has redness in hand.   ROM is very limited.     No h/o gout.     ROS:  Review of Systems   Constitutional:  Negative for chills, diaphoresis, fatigue and fever.   Respiratory:  Negative for cough, shortness of breath and wheezing.    Gastrointestinal:  Negative for diarrhea, nausea and vomiting.   Musculoskeletal:  Positive for arthralgias. Negative for myalgias.   Skin:  Negative for rash and wound.   Neurological:  Negative for dizziness, light-headedness and headaches.        VITALS:  /80   Pulse 88   Temp 97.8 °F (36.6 °C)   Resp 16   Ht 1.854 m (6' 1\")   Wt 121.1 kg (267 lb)   SpO2 99%   BMI 35.23 kg/m²      PE:  Physical Exam  Vitals and nursing note reviewed.   Constitutional:       General: He is not in acute distress.     Appearance: Normal appearance. He is well-developed and normal weight. He is not diaphoretic.   Cardiovascular:      Rate and Rhythm:

## 2024-04-23 DIAGNOSIS — M79.644 FINGER PAIN, RIGHT: ICD-10-CM

## 2024-04-23 DIAGNOSIS — M79.89 FINGER SWELLING: Primary | ICD-10-CM

## 2024-04-30 ENCOUNTER — OFFICE VISIT (OUTPATIENT)
Dept: ORTHOPEDIC SURGERY | Age: 69
End: 2024-04-30

## 2024-04-30 VITALS — WEIGHT: 267 LBS | RESPIRATION RATE: 14 BRPM | HEIGHT: 73 IN | BODY MASS INDEX: 35.39 KG/M2

## 2024-04-30 DIAGNOSIS — M19.049 ARTHRITIS OF HAND: ICD-10-CM

## 2024-04-30 DIAGNOSIS — M65.30 TRIGGER FINGER, ACQUIRED: Primary | ICD-10-CM

## 2024-04-30 RX ORDER — LIDOCAINE HYDROCHLORIDE 10 MG/ML
0.5 INJECTION, SOLUTION INFILTRATION; PERINEURAL ONCE
Status: COMPLETED | OUTPATIENT
Start: 2024-04-30 | End: 2024-04-30

## 2024-04-30 RX ORDER — TRIAMCINOLONE ACETONIDE 40 MG/ML
20 INJECTION, SUSPENSION INTRA-ARTICULAR; INTRAMUSCULAR ONCE
Status: COMPLETED | OUTPATIENT
Start: 2024-04-30 | End: 2024-04-30

## 2024-04-30 RX ADMIN — TRIAMCINOLONE ACETONIDE 20 MG: 40 INJECTION, SUSPENSION INTRA-ARTICULAR; INTRAMUSCULAR at 12:30

## 2024-04-30 RX ADMIN — LIDOCAINE HYDROCHLORIDE 0.5 ML: 10 INJECTION, SOLUTION INFILTRATION; PERINEURAL at 12:30

## 2024-04-30 SDOH — HEALTH STABILITY: PHYSICAL HEALTH: ON AVERAGE, HOW MANY DAYS PER WEEK DO YOU ENGAGE IN MODERATE TO STRENUOUS EXERCISE (LIKE A BRISK WALK)?: 5 DAYS

## 2024-04-30 SDOH — HEALTH STABILITY: PHYSICAL HEALTH: ON AVERAGE, HOW MANY MINUTES DO YOU ENGAGE IN EXERCISE AT THIS LEVEL?: 50 MIN

## 2024-04-30 NOTE — PROGRESS NOTES
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  Symptoms have been present for 10 days.  The digit is stiff, especially in the morning and will frequently stick in the palm when flexed and pop when extended.  This is often associated with pain.  Mild swelling has been noticed.  The patient denies discoloration or history of injury or overuse.  Treatment has been prescribed(steroid dose pack-partial relief).  The problem has worsened slightly recently.  The pain assessment has been reviewed and is correct as stated..    The patient's social history, past medical history, family history, medications, allergies and review of systems, entered 4/30/24, have been reviewed, and dated and are recorded in the chart.    On examination the patient is Height: 185.4 cm (6' 1\") tall and weighs Weight - Scale: 121.1 kg (267 lb). Respirations are 18 per minute.  The patient is well nourished, is oriented to time and place, demonstrates appropriate mood and affect as well as normal gait and station.  There is mild soft tissue swelling of the digit.  There is no deformity. There is tenderness, thickening and nodularity at the base of the flexor tendon sheath.  Range of motion is slightly limited in flexion and extension.  The digit sticks in flexion and pops into extension, accompanied by some pain. Skin is intact without lesions.  Distal circulation and sensation are intact.  Muscle strength and coordination are normal.  Reflexes are present bilaterally.  Joints are stable.  There are no subcutaneous nodules or enlarged epitrochlear lymph nodes.    I have personally reviewed and interpreted all previous external imaging studies(Xrays , laboratory tests(CBC+Diff, Uric Acid, BMP), diagnostic procedures and medical encounters pertinent to this patient's visit today.    Review

## 2024-05-06 ENCOUNTER — OFFICE VISIT (OUTPATIENT)
Dept: CARDIOLOGY CLINIC | Age: 69
End: 2024-05-06
Payer: COMMERCIAL

## 2024-05-06 VITALS
DIASTOLIC BLOOD PRESSURE: 60 MMHG | BODY MASS INDEX: 35.09 KG/M2 | SYSTOLIC BLOOD PRESSURE: 120 MMHG | WEIGHT: 266 LBS | HEART RATE: 81 BPM

## 2024-05-06 DIAGNOSIS — I48.91 ATRIAL FIBRILLATION, UNSPECIFIED TYPE (HCC): ICD-10-CM

## 2024-05-06 DIAGNOSIS — I25.83 CORONARY ARTERY DISEASE DUE TO LIPID RICH PLAQUE: ICD-10-CM

## 2024-05-06 DIAGNOSIS — I10 HTN (HYPERTENSION), BENIGN: Primary | ICD-10-CM

## 2024-05-06 DIAGNOSIS — E78.5 HYPERLIPIDEMIA, UNSPECIFIED HYPERLIPIDEMIA TYPE: ICD-10-CM

## 2024-05-06 DIAGNOSIS — I25.10 CORONARY ARTERY DISEASE DUE TO LIPID RICH PLAQUE: ICD-10-CM

## 2024-05-06 PROCEDURE — 3074F SYST BP LT 130 MM HG: CPT | Performed by: INTERNAL MEDICINE

## 2024-05-06 PROCEDURE — 3078F DIAST BP <80 MM HG: CPT | Performed by: INTERNAL MEDICINE

## 2024-05-06 PROCEDURE — 99214 OFFICE O/P EST MOD 30 MIN: CPT | Performed by: INTERNAL MEDICINE

## 2024-05-06 PROCEDURE — 1123F ACP DISCUSS/DSCN MKR DOCD: CPT | Performed by: INTERNAL MEDICINE

## 2024-05-06 RX ORDER — TADALAFIL 20 MG/1
20 TABLET ORAL DAILY PRN
Qty: 30 TABLET | Refills: 3 | Status: SHIPPED | OUTPATIENT
Start: 2024-05-06

## 2024-05-06 NOTE — PROGRESS NOTES
Subjective:      Patient ID: Cem Gentile is a 69 y.o. male.    CC:  Follow up CAD htn and new onset AF.    HPI:   Mr. Gentile is doing well overall.  No chest pain.  Wants to get active.  Overall he feels well and was started on eliquis.  ECG shows SR after R PDA stenting and then AF ablation.         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    Patient  has a past medical history of Torn meniscus; S/P colonoscopy; and Shoulder injury.     Current Outpatient Prescriptions   Medication Sig Dispense Refill    ibuprofen (ADVIL;MOTRIN) 800 MG tablet TAKE 1 TABLET BY MOUTH EVERY 8 HOURS FOR PAIN  90 tablet  0    simvastatin (ZOCOR) 80 MG tablet TAKE 1 TABLET BY MOUTH NIGHTLY  30 tablet  6    atovaquone-proguanil (MALARONE) 250-100 MG per tablet Take 1 tablet by mouth daily.  21 tablet  0    clopidogrel (PLAVIX) 75 MG tablet TAKE 1 TABLET BY MOUTH DAILY.  30 tablet  6    metoprolol (TOPROL-XL) 50 MG XL tablet TAKE 1 TABLET BY MOUTH DAILY.  30 tablet  6    aspirin 81 MG EC tablet Take 81 mg by mouth daily.           No current facility-administered medications for this visit.        Vitals  Weight: 280 lb (127.007 kg)  Blood Pressure:  104/62  Pulse: 76     Review of Systems   Constitutional: Negative.    HENT: Negative.    Eyes: Negative.    Respiratory: Negative.    Cardiovascular: Negative.    Gastrointestinal: Negative.    Genitourinary: Negative.    Musculoskeletal: Negative.    Neurological: Negative.    Hematological: Negative.

## 2024-05-21 RX ORDER — DOFETILIDE 0.5 MG/1
500 CAPSULE ORAL EVERY 12 HOURS SCHEDULED
Qty: 60 CAPSULE | Refills: 5 | Status: SHIPPED | OUTPATIENT
Start: 2024-05-21

## 2024-05-21 NOTE — TELEPHONE ENCOUNTER
Medication refill:    Medication  dofetilide (TIKOSYN) 500 MCG capsule [[id number]]  dofetilide (TIKOSYN) 500 MCG capsule [[id number]]    Order Details  Dose: 500 mcg Route: Oral Frequency: EVERY 12 HOURS SCHEDULED   Dispense Quantity: 60 capsule Refills: 5          Sig: TAKE 1 CAPSULE BY MOUTH EVERY 12 HOURS     Pharmacy  [facility]/pharmacy #[id number] - [place], [place] - [address] - P [phone] - F [phone]  [address], [place] [address]  Phone: [phone]  Fax: [phone]     Last visit: 5/6/24  Next visit:10/8/24  Refill: 9/1/23  Last labs: 4/22/24

## 2024-07-30 DIAGNOSIS — R21 RASH: ICD-10-CM

## 2024-07-30 RX ORDER — POTASSIUM CHLORIDE 20 MEQ/1
20 TABLET, EXTENDED RELEASE ORAL DAILY
Qty: 90 TABLET | Refills: 3 | Status: SHIPPED | OUTPATIENT
Start: 2024-07-30

## 2024-07-30 RX ORDER — CLOTRIMAZOLE AND BETAMETHASONE DIPROPIONATE 10; .64 MG/G; MG/G
CREAM TOPICAL
Qty: 90 G | Refills: 0 | Status: SHIPPED | OUTPATIENT
Start: 2024-07-30

## 2024-07-30 NOTE — TELEPHONE ENCOUNTER
Medication:   Requested Prescriptions     Pending Prescriptions Disp Refills    clotrimazole-betamethasone (LOTRISONE) 1-0.05 % cream [Pharmacy Med Name: Clotrimazole-Betamethasone 1-0.05 % External Cream] 90 g 1     Sig: APPLY TOPICALLY TWICE DAILY     Last Filled:  4.5.23    Last appt: 4/22/2024   Next appt: Visit date not found    Last OARRS:        No data to display

## 2024-08-06 ENCOUNTER — OFFICE VISIT (OUTPATIENT)
Dept: PRIMARY CARE CLINIC | Age: 69
End: 2024-08-06
Payer: COMMERCIAL

## 2024-08-06 VITALS
SYSTOLIC BLOOD PRESSURE: 120 MMHG | HEIGHT: 73 IN | HEART RATE: 77 BPM | BODY MASS INDEX: 35.25 KG/M2 | OXYGEN SATURATION: 97 % | WEIGHT: 266 LBS | TEMPERATURE: 97.7 F | DIASTOLIC BLOOD PRESSURE: 77 MMHG

## 2024-08-06 DIAGNOSIS — I42.8 NICM (NONISCHEMIC CARDIOMYOPATHY) (HCC): ICD-10-CM

## 2024-08-06 DIAGNOSIS — N40.0 BENIGN PROSTATIC HYPERPLASIA WITHOUT LOWER URINARY TRACT SYMPTOMS: ICD-10-CM

## 2024-08-06 DIAGNOSIS — I48.0 PAROXYSMAL ATRIAL FIBRILLATION (HCC): ICD-10-CM

## 2024-08-06 DIAGNOSIS — E78.2 MIXED HYPERLIPIDEMIA: ICD-10-CM

## 2024-08-06 DIAGNOSIS — Z76.89 ENCOUNTER TO ESTABLISH CARE: Primary | ICD-10-CM

## 2024-08-06 DIAGNOSIS — M54.12 CERVICAL RADICULOPATHY: ICD-10-CM

## 2024-08-06 DIAGNOSIS — I47.29 NSVT (NONSUSTAINED VENTRICULAR TACHYCARDIA) (HCC): ICD-10-CM

## 2024-08-06 DIAGNOSIS — M54.2 NECK PAIN ON LEFT SIDE: ICD-10-CM

## 2024-08-06 DIAGNOSIS — I10 PRIMARY HYPERTENSION: ICD-10-CM

## 2024-08-06 DIAGNOSIS — K21.9 GASTROESOPHAGEAL REFLUX DISEASE WITHOUT ESOPHAGITIS: ICD-10-CM

## 2024-08-06 PROBLEM — R60.0 LOCALIZED EDEMA: Status: RESOLVED | Noted: 2017-11-14 | Resolved: 2024-08-06

## 2024-08-06 PROBLEM — M65.30 TRIGGER FINGER, ACQUIRED: Status: RESOLVED | Noted: 2024-04-30 | Resolved: 2024-08-06

## 2024-08-06 PROCEDURE — 96372 THER/PROPH/DIAG INJ SC/IM: CPT | Performed by: FAMILY MEDICINE

## 2024-08-06 PROCEDURE — 99213 OFFICE O/P EST LOW 20 MIN: CPT | Performed by: FAMILY MEDICINE

## 2024-08-06 PROCEDURE — 1123F ACP DISCUSS/DSCN MKR DOCD: CPT | Performed by: FAMILY MEDICINE

## 2024-08-06 PROCEDURE — 3078F DIAST BP <80 MM HG: CPT | Performed by: FAMILY MEDICINE

## 2024-08-06 PROCEDURE — 3074F SYST BP LT 130 MM HG: CPT | Performed by: FAMILY MEDICINE

## 2024-08-06 RX ORDER — KETOROLAC TROMETHAMINE 30 MG/ML
30 INJECTION, SOLUTION INTRAMUSCULAR; INTRAVENOUS ONCE
Status: DISCONTINUED | OUTPATIENT
Start: 2024-08-06 | End: 2024-08-06

## 2024-08-06 RX ORDER — KETOROLAC TROMETHAMINE 30 MG/ML
30 INJECTION, SOLUTION INTRAMUSCULAR; INTRAVENOUS ONCE
Status: COMPLETED | OUTPATIENT
Start: 2024-08-06 | End: 2024-08-06

## 2024-08-06 RX ADMIN — KETOROLAC TROMETHAMINE 30 MG: 30 INJECTION, SOLUTION INTRAMUSCULAR; INTRAVENOUS at 14:12

## 2024-08-06 ASSESSMENT — ENCOUNTER SYMPTOMS
SORE THROAT: 0
EYE DISCHARGE: 0
BACK PAIN: 1
EYE ITCHING: 0
DIARRHEA: 0
TROUBLE SWALLOWING: 0
VOMITING: 0
SHORTNESS OF BREATH: 0
ABDOMINAL PAIN: 0
COUGH: 0
NAUSEA: 0

## 2024-08-06 NOTE — PROGRESS NOTES
Cem Gentile (:  1955) is a 68 y.o. male,Established patient, here for evaluation of the following chief complaint(s):  Neck Pain (Continuous neck pain for past 7 days. /Dull aching pain, moderate in intensity, radiating towards left shoulder. Assocaciated with tingling./Worsened with movement towards right. )      ASSESSMENT/PLAN:  1. Encounter to establish care  2. Paroxysmal atrial fibrillation (HCC)  Assessment & Plan:   Monitored by specialist- no acute findings meriting change in the plan  3. Primary hypertension  Assessment & Plan:   Monitored by specialist- no acute findings meriting change in the plan  4. NICM (nonischemic cardiomyopathy) ([facility])  5. NSVT (nonsustained ventricular tachycardia) ([facility])  6. Mixed hyperlipidemia  Assessment & Plan:   Monitored by specialist- no acute findings meriting change in the plan  7. Gastroesophageal reflux disease without esophagitis  Assessment & Plan:  Well-controlled, continue current medications  8. Neck pain on left side  9. Cervical radiculopathy  10. Benign prostatic hyperplasia without lower urinary tract symptoms  Assessment & Plan:   Well-controlled, continue current medications      Patient aware of the risks associated with Toradol along with his Eliquis, Lasix and Plavix, advised to continue monitor for any excessive bleeding, last creatinine was in the normal range  Toradol shot, tolerated well  Advised to take nightly muscle relaxer which she has at home and tolerates well until pain subsides  IF toradol does not help, consider medrol dose pack  PT [employer] tbe an option if needed     Return in about 6 months (around 2025), or if symptoms worsen or fail to improve, for physical .    SUBJECTIVE/OBJECTIVE:  HPI    Patient here to establish care      Paroxysmal atrial fibrillation  Nonischemic cardiomyopathy  Nonsustained ventricular tachycardia  Primary hypertension  Mixed hyperlipidemia  -Patient follows with cardiology  -Current medications include

## 2024-08-22 ENCOUNTER — OFFICE VISIT (OUTPATIENT)
Dept: PRIMARY CARE CLINIC | Age: 69
End: 2024-08-22
Payer: COMMERCIAL

## 2024-08-22 VITALS
TEMPERATURE: 96.9 F | HEART RATE: 73 BPM | WEIGHT: 255.04 LBS | DIASTOLIC BLOOD PRESSURE: 68 MMHG | SYSTOLIC BLOOD PRESSURE: 112 MMHG | BODY MASS INDEX: 33.65 KG/M2 | OXYGEN SATURATION: 98 %

## 2024-08-22 DIAGNOSIS — M54.12 CERVICAL RADICULOPATHY: ICD-10-CM

## 2024-08-22 DIAGNOSIS — M54.2 NECK PAIN ON LEFT SIDE: Primary | ICD-10-CM

## 2024-08-22 PROCEDURE — 3078F DIAST BP <80 MM HG: CPT | Performed by: FAMILY MEDICINE

## 2024-08-22 PROCEDURE — 1123F ACP DISCUSS/DSCN MKR DOCD: CPT | Performed by: FAMILY MEDICINE

## 2024-08-22 PROCEDURE — 3074F SYST BP LT 130 MM HG: CPT | Performed by: FAMILY MEDICINE

## 2024-08-22 PROCEDURE — 99213 OFFICE O/P EST LOW 20 MIN: CPT | Performed by: FAMILY MEDICINE

## 2024-08-22 RX ORDER — IBUPROFEN 800 MG/1
800 TABLET ORAL EVERY 6 HOURS PRN
COMMUNITY
End: 2024-08-22 | Stop reason: SDUPTHER

## 2024-08-22 RX ORDER — METHYLPREDNISOLONE 4 MG/1
TABLET ORAL
Qty: 1 KIT | Refills: 0 | Status: SHIPPED | OUTPATIENT
Start: 2024-08-22 | End: 2024-08-28

## 2024-08-22 RX ORDER — IBUPROFEN 800 MG/1
800 TABLET ORAL EVERY 6 HOURS PRN
Qty: 120 TABLET | Refills: 0 | Status: SHIPPED | OUTPATIENT
Start: 2024-08-22

## 2024-08-22 ASSESSMENT — ENCOUNTER SYMPTOMS
VOMITING: 0
EYE ITCHING: 0
NAUSEA: 0
TROUBLE SWALLOWING: 0
EYE DISCHARGE: 0
ABDOMINAL PAIN: 0
SHORTNESS OF BREATH: 0
SORE THROAT: 0
DIARRHEA: 0
COUGH: 0

## 2024-08-22 NOTE — PROGRESS NOTES
Cem Gentile (:  1955) is a 69 y.o. male,Established patient, here for evaluation of the following chief complaint(s):  Neck Pain      ASSESSMENT/PLAN:  1. Neck pain on left side  -     ibuprofen (ADVIL;MOTRIN) 800 MG tablet; Take 1 tablet by mouth every 6 hours as needed for Pain, Disp-120 tablet, R-0Normal  -     methylPREDNISolone (MEDROL DOSEPACK) 4 MG tablet; Take by mouth., Disp-1 kit, R-0Normal  -     [facility] [place] [facility]  2. Cervical radiculopathy  -     ibuprofen (ADVIL;MOTRIN) 800 MG tablet; Take 1 tablet by mouth every 6 hours as needed for Pain, Disp-120 tablet, R-0Normal  -     methylPREDNISolone (MEDROL DOSEPACK) 4 MG tablet; Take by mouth., Disp-1 kit, R-0Normal  -     [facility] [place] [facility]      Medrol dose pack (avoid with ibuprofen)  Continue conservative management  PT  If no improvement, pt will call us or message - order MRI cervical spine    No follow-ups on file.    SUBJECTIVE/OBJECTIVE:  HPI    Patient presents for follow-up on neck pain  -Seen earlier in August for the same pain and given a Toradol shot  -Does not think that it seemed to help/control the pain  - helped a little but the pain came back   - has taken steroids before, no side effects   - still taking ibuprofen 800 mg upto 3 times a day - \"gets me through the day\"  - going to chiro - \"medicine might be stopping the healing\", referring to statins  - cardio ok with ibuprofen - as per patient      Review of Systems   Constitutional:  Negative for appetite change, chills, fatigue and fever.   HENT:  Negative for congestion, ear pain, sneezing, sore throat and trouble swallowing.    Eyes:  Negative for discharge and itching.   Respiratory:  Negative for cough and shortness of breath.    Cardiovascular:  Negative for chest pain and leg swelling.   Gastrointestinal:  Negative for abdominal pain, diarrhea, nausea and vomiting.   Genitourinary:  Negative for dysuria and

## 2024-08-28 ENCOUNTER — HOSPITAL ENCOUNTER (OUTPATIENT)
Dept: PHYSICAL THERAPY | Age: 69
Setting detail: THERAPIES SERIES
Discharge: HOME | End: 2024-08-28
Payer: COMMERCIAL

## 2024-08-28 DIAGNOSIS — M54.2 NECK PAIN: Primary | ICD-10-CM

## 2024-08-28 PROCEDURE — 97110 THERAPEUTIC EXERCISES: CPT

## 2024-08-28 PROCEDURE — 97161 PT EVAL LOW COMPLEX 20 MIN: CPT

## 2024-08-28 NOTE — PLAN OF CARE
[facility] [address], [place] [address] office: [phone] fax: [phone]     Physical Therapy Initial Evaluation Certification      Dear Coretta Deleon MD,    We had the pleasure of evaluating the following patient for physical therapy services at [facility].  A summary of our findings can be found in the initial assessment below.  This includes our plan of care.  If you have any questions or concerns regarding these findings, please do not hesitate to contact me at the office phone number listed above.  Thank you for the referral.     Physician Signature:_______________________________Date:__________________  By signing above (or electronic signature), therapist’s plan is approved by physician       Physical Therapy: TREATMENT/PROGRESS NOTE   Patient: Cem Gentile (69 y.o. male)   Examination Date: 2024   :  1955 MRN: [id number]   Visit #: 1   Insurance Allowable Auth Needed   20 []Yes    [x]No    Insurance: Payor: [employer] / Plan: [facility] CHOICE PLUS / Product Type: *No Product type* /   Insurance ID: [id number] - (Commercial)  Secondary Insurance (if applicable): MEDICARE   Treatment Diagnosis:     ICD-10-CM    1. Neck pain  M54.2          Medical Diagnosis:  Neck pain on left side [M54.2]  Cervical radiculopathy [M54.12]   Referring Physician: Coretta Deleon MD  PCP: Coretta Deleon MD     Plan of care signed (Y/N):     Date of Patient follow up with Physician:      Plan of Care Report: EVAL today  POC update due: (10 visits /OR AUTH LIMITS, whichever is less)  2024                                             Medical History:  Comorbidities:  Hypertension  Other Cardiovascular Conditions: hx of MI  Relevant Medical History:                                           Precautions/ Contra-indications:           Latex allergy:  NO  Pacemaker:    NO  Contraindications for  to WFL without pain to allow for proper joint functioning to enable patient to turn head while driving.   [] Progressing: [] Met: [] Not Met: [] Adjusted  3. Patient will demonstrate increased Strength of scap and GH musculautre to at least 4+/5 throughout without pain to allow for proper functional mobility to enable patient to return to all PLOF.   [] Progressing: [] Met: [] Not Met: [] Adjusted  4. Patient will return to sleep without increased symptoms or restriction.   [] Progressing: [] Met: [] Not Met: [] Adjusted  5. Pt will exhibit self posture correction in clinic. (patient specific functional goal)    [] Progressing: [] Met: [] Not Met: [] Adjusted     Overall Progression Towards Functional goals/ Treatment Progress Update:  [] Patient is progressing as expected towards functional goals listed.    [] Progression is slowed due to complexities/Impairments listed.  [] Progression has been slowed due to co-morbidities.  [x] Plan just implemented, too soon (<30days) to assess goals progression   [] Goals require adjustment due to lack of progress  [] Patient is not progressing as expected and requires additional follow up with physician  [] Other:     TREATMENT PLAN     Frequency/Duration: 1-2x/week for  12  weeks for the following treatment interventions:    Interventions:  Therapeutic Exercise (50048) including: strength training, ROM, and functional mobility  Therapeutic Activities (39290) including: functional mobility training and education.  Neuromuscular Re-education (11558) activation and proprioception, including postural re-education.    Manual Therapy (16156) as indicated to include: Passive Range of Motion, Gr I-IV mobilizations, Soft Tissue Mobilization, Trigger Point Release, and Myofascial Release  Modalities as needed that may include: Cryotherapy, Electrical Stimulation, Thermal Agents, and Traction  Patient education on postural re-education, activity modification, and progression of

## 2024-09-09 ENCOUNTER — HOSPITAL ENCOUNTER (OUTPATIENT)
Dept: PHYSICAL THERAPY | Age: 69
Setting detail: THERAPIES SERIES
Discharge: HOME | End: 2024-09-09
Payer: COMMERCIAL

## 2024-09-09 PROCEDURE — 97110 THERAPEUTIC EXERCISES: CPT

## 2024-09-09 PROCEDURE — 97140 MANUAL THERAPY 1/> REGIONS: CPT

## 2024-09-16 ENCOUNTER — HOSPITAL ENCOUNTER (OUTPATIENT)
Dept: PHYSICAL THERAPY | Age: 69
Setting detail: THERAPIES SERIES
Discharge: HOME | End: 2024-09-16
Payer: COMMERCIAL

## 2024-09-16 PROCEDURE — 97140 MANUAL THERAPY 1/> REGIONS: CPT

## 2024-09-16 PROCEDURE — 97012 MECHANICAL TRACTION THERAPY: CPT

## 2024-09-16 PROCEDURE — 97110 THERAPEUTIC EXERCISES: CPT

## 2024-09-19 DIAGNOSIS — M54.12 CERVICAL RADICULOPATHY: ICD-10-CM

## 2024-09-19 DIAGNOSIS — M54.2 NECK PAIN ON LEFT SIDE: ICD-10-CM

## 2024-09-19 RX ORDER — IBUPROFEN 800 MG/1
800 TABLET, FILM COATED ORAL EVERY 6 HOURS PRN
Qty: 120 TABLET | Refills: 0 | Status: SHIPPED | OUTPATIENT
Start: 2024-09-19

## 2024-09-25 ENCOUNTER — HOSPITAL ENCOUNTER (OUTPATIENT)
Dept: PHYSICAL THERAPY | Age: 69
Setting detail: THERAPIES SERIES
Discharge: HOME | End: 2024-09-25
Payer: COMMERCIAL

## 2024-09-25 PROCEDURE — 97140 MANUAL THERAPY 1/> REGIONS: CPT

## 2024-09-25 PROCEDURE — 97110 THERAPEUTIC EXERCISES: CPT

## 2024-09-26 RX ORDER — FUROSEMIDE 80 MG
80 TABLET ORAL DAILY
Qty: 90 TABLET | Refills: 3 | Status: SHIPPED | OUTPATIENT
Start: 2024-09-26

## 2024-10-02 ENCOUNTER — APPOINTMENT (OUTPATIENT)
Dept: PHYSICAL THERAPY | Age: 69
End: 2024-10-02
Payer: COMMERCIAL

## 2024-10-23 ENCOUNTER — HOSPITAL ENCOUNTER (OUTPATIENT)
Dept: PHYSICAL THERAPY | Age: 69
Setting detail: THERAPIES SERIES
Discharge: HOME | End: 2024-10-23
Payer: COMMERCIAL

## 2024-10-23 PROCEDURE — 97140 MANUAL THERAPY 1/> REGIONS: CPT

## 2024-10-23 PROCEDURE — 97012 MECHANICAL TRACTION THERAPY: CPT

## 2024-10-23 PROCEDURE — 97110 THERAPEUTIC EXERCISES: CPT

## 2024-10-23 NOTE — PLAN OF CARE
[facility] [address], [place] [address] office: [phone] fax: [phone]     Physical Therapy Re-Certification Plan of Care    Dear Coretta Deleon MD  ,    We had the pleasure of treating the following patient for physical therapy services at [facility]. A summary of our findings can be found in the updated assessment below.  This includes our plan of care.  If you have any questions or concerns regarding these findings, please do not hesitate to contact me at the office phone number checked above.  Thank you for the referral.     Physician Signature:________________________________Date:__________________  By signing above (or electronic signature), therapist's plan is approved by physician      Functional Outcome: 18%  Cem Gentile 1955 continues to present with functional deficits in ROM, joint mobility, strength symmetry, and endurance of strength  limiting ability with reaching overhead, carrying items, lifting items, pushing or pulling activity, and light home activity .  During therapy this date, patient required verbal cueing, progression of exercises and program, and manual interventions for exercise progression, improving proper muscle recruitment and activation/motor control patterns, promoting relaxation, increasing ROM, reduce/eliminate soft tissue swelling/inflammation/restriction, improving soft tissue extensibility, allowing for proper ROM, and improving postural awareness. Patient will continue to benefit from ongoing evaluation and advanced clinical decision from a Physical Therapist to improve ROM, muscle strength, neuromuscular control, endurance, and proper body mechanics to safely return to PLOF and ADLs/IADLs without symptoms or restrictions.    Overall Response to Treatment:  Patient is responding well to treatment and improvement is noted with regards to goals    Total Visits: 5

## 2024-10-24 RX ORDER — CLOPIDOGREL BISULFATE 75 MG/1
75 TABLET ORAL DAILY
Qty: 30 TABLET | Refills: 3 | Status: SHIPPED | OUTPATIENT
Start: 2024-10-24

## 2024-10-24 NOTE — TELEPHONE ENCOUNTER
Patient called and stated he was prescribed clopidogrel (PLAVIX) 75 MG tablet by Dr. Sanders when he was here.  He stated he went for a refill but the pharmacy stated they needed a new script from Dr. OLIVERA.  He stated he will be out of this medication in 3 days.

## 2024-10-24 NOTE — TELEPHONE ENCOUNTER
I Medication Refills:    clopidogrel (PLAVIX) 75 MG tablet [[id number]]    Order Details  Dose, Route, Frequency: As Directed   Dispense Quantity: 90 tablet Refills: 3    Note to Pharmacy: Requesting 1 year supply         Sig: TAKE 1 TABLET BY MOUTH  DAILY       Last Office Visit: 5/6/24    Next Office Visit: 11/7/24    He needs a 30 day supply sent to his local pharmacy first because it will take a little while before [facility] would refill the 90 day supply.    [facility]/PHARMACY #[id number] - [place], [place] - [address] - P [phone] - F [phone] [[id number]]       He needs a another script for the 90 days sent to the home mail order.  He stated he won't get his medication until after a week.    [facility] - [place], [place] - [address] - P [phone] - F [phone] [[id number]]

## 2024-10-29 RX ORDER — CLOPIDOGREL BISULFATE 75 MG/1
75 TABLET ORAL DAILY
Qty: 90 TABLET | Refills: 3 | Status: SHIPPED | OUTPATIENT
Start: 2024-10-29

## 2024-10-29 NOTE — TELEPHONE ENCOUNTER
Requested Prescriptions     Pending Prescriptions Disp Refills    clopidogrel (PLAVIX) 75 MG tablet 90 tablet 3     Sig: Take 1 tablet by mouth daily            Checked Correct Pharmacy: Yes    Any changes since last refill? No     Number: 90    Refills: 3    Last Office Visit: 5/6/2024     Next Office Visit: 11/7/2024     Last Refill: 10.24.2024    Last Labs: 04.22.2024

## 2024-10-30 ENCOUNTER — HOSPITAL ENCOUNTER (OUTPATIENT)
Dept: PHYSICAL THERAPY | Age: 69
Setting detail: THERAPIES SERIES
Discharge: HOME | End: 2024-10-30
Payer: COMMERCIAL

## 2024-10-30 PROCEDURE — 97012 MECHANICAL TRACTION THERAPY: CPT

## 2024-10-30 PROCEDURE — 97140 MANUAL THERAPY 1/> REGIONS: CPT

## 2024-10-30 PROCEDURE — 97110 THERAPEUTIC EXERCISES: CPT

## 2024-11-04 ENCOUNTER — TELEPHONE (OUTPATIENT)
Dept: CARDIOLOGY CLINIC | Age: 69
End: 2024-11-04

## 2024-11-04 RX ORDER — DOFETILIDE 0.5 MG/1
500 CAPSULE ORAL EVERY 12 HOURS SCHEDULED
Qty: 180 CAPSULE | Refills: 1 | Status: SHIPPED | OUTPATIENT
Start: 2024-11-04

## 2024-11-04 NOTE — TELEPHONE ENCOUNTER
Requested Prescriptions     Pending Prescriptions Disp Refills    dofetilide (TIKOSYN) 500 MCG capsule 180 capsule 3     Sig: Take 1 capsule by mouth every 12 hours            Checked Correct Pharmacy: Yes    Any changes since last refill? No     Number: 180     Refills: 3    Last Office Visit: 5/6/2024     Next Office Visit: 11/7/2024     Last Refill: 05/21/2024    Last Labs: 02/21/2024

## 2024-11-04 NOTE — TELEPHONE ENCOUNTER
Patient calling requesting refill on   dofetilide  dofetilide (TIKOSYN) 500 MCG capsule 90 day supply. Patient would like it sent to [facility] in [place]. Last OV 5.6.24 next OV 11.7.24 labs done 4.22.24

## 2024-11-06 ENCOUNTER — HOSPITAL ENCOUNTER (OUTPATIENT)
Dept: PHYSICAL THERAPY | Age: 69
Setting detail: THERAPIES SERIES
Discharge: HOME | End: 2024-11-06
Payer: COMMERCIAL

## 2024-11-06 PROCEDURE — 97012 MECHANICAL TRACTION THERAPY: CPT

## 2024-11-06 PROCEDURE — 97110 THERAPEUTIC EXERCISES: CPT

## 2024-11-06 PROCEDURE — 97140 MANUAL THERAPY 1/> REGIONS: CPT

## 2024-11-06 NOTE — FLOWSHEET NOTE
posture correction in clinic. (patient specific functional goal)    [] Progressing: [x] Met: [] Not Met: [] Adjusted     Overall Progression Towards Functional goals/ Treatment Progress Update:  [] Patient is progressing as expected towards functional goals listed.    [] Progression is slowed due to complexities/Impairments listed.  [] Progression has been slowed due to co-morbidities.  [x] Plan just implemented, too soon (<30days) to assess goals progression   [] Goals require adjustment due to lack of progress  [] Patient is not progressing as expected and requires additional follow up with physician  [] Other:     TREATMENT PLAN     Interventions:  Therapeutic Exercise (27202) including: strength training, ROM, and functional mobility  Therapeutic Activities (43216) including: functional mobility training and education.  Neuromuscular Re-education (36540) activation and proprioception, including postural re-education.    Manual Therapy (56088) as indicated to include: Passive Range of Motion, Gr I-IV mobilizations, Soft Tissue Mobilization, Trigger Point Release, and Myofascial Release  Modalities as needed that may include: Cryotherapy, Electrical Stimulation, Thermal Agents, and Traction  Patient education on postural re-education, activity modification, and progression of HEP    Plan: Cont POC- Continue emphasis/focus on exercise progression, improving proper muscle recruitment and activation/motor control patterns, promoting relaxation, and increasing ROM. Next visit plan to progress weights, progress reps, and add new exercises     Electronically Signed by Rachell Greene, PT  Date: 11/06/2024     Note: Portions of this note have been templated and/or copied from initial evaluation, reassessments and prior notes for documentation efficiency.    Note: If patient does not return for scheduled/recommended follow up visits, this note will serve as a discharge from care along with the most recent update on

## 2024-11-06 NOTE — PROGRESS NOTES
following reasons:    Cannot find a previous HDL lab    Cannot find a previous total cholesterol lab      Imaging:     Last ECG (if available, Personally interpreted):  Sinus rhythm.  PVC  Last Monitor/Holter (if available):    Last Stress (if available):3/23/21       Summary   There is a small fixed perfusion defect at the inferoapical wall of the Left   ventricle consistent with infarction. There is no perfusion defect to   suggest ischemia.   The LVEF is reduced at 37% with global mild hypokinesis and moderate   hypokinesis at the inferoapical wall.   The TID is normal at 0.99.      This is an intermediate risk cardiac scan.    Last Cath (if available):4/23/21    CONCLUSION:  Successful stenting x2 to right posterior descending  coronary artery with a 2.5 mm x 8 mm [employer] and a 2.75 mm x 26 mm length  [employer].  ERICH-3 blood flow was present post procedure in the right  posterior descending coronary artery.  There was a 50% ostial small  diagonal branch stenosis, 20% in-stent stenosis of the LAD, circumflex  normal, left main normal.  LVEF 42% with inferior wall hypokinesia.         Last TTE/YVROSE(if available):2/20/24     Summary   Left ventricular cavity size is normal. There is mild concentric left   ventricular hypertrophy.   Left ventricular function is low normal with ejection fraction estimated at   50-55%.   No regional wall motion abnormalities are noted. Indeterminate diastolic   function.   Mild mitral regurgitation is present.   The left atrium is mildly dilated.   Mild aortic regurgitation is present.   The aortic root is dilated measuring 4.1 cm.   The ascending aorta is dilated measuring 4.1 cm.   The right ventricle is enlarged.   Estimated pulmonary artery systolic pressure is at 31 mmHg assuming a right   atrial pressure of 8 mmHg.    Last CMR  (if available):    Last Coronary Artery Calcium Score:                 All questions and concerns were addressed to the patient/family. Alternatives to my

## 2024-11-07 ENCOUNTER — OFFICE VISIT (OUTPATIENT)
Dept: CARDIOLOGY CLINIC | Age: 69
End: 2024-11-07
Payer: COMMERCIAL

## 2024-11-07 VITALS
HEART RATE: 67 BPM | SYSTOLIC BLOOD PRESSURE: 124 MMHG | WEIGHT: 269 LBS | DIASTOLIC BLOOD PRESSURE: 70 MMHG | OXYGEN SATURATION: 97 % | BODY MASS INDEX: 35.49 KG/M2

## 2024-11-07 DIAGNOSIS — I10 HTN (HYPERTENSION), BENIGN: ICD-10-CM

## 2024-11-07 DIAGNOSIS — E78.5 HYPERLIPIDEMIA, UNSPECIFIED HYPERLIPIDEMIA TYPE: ICD-10-CM

## 2024-11-07 DIAGNOSIS — I25.10 CAD IN NATIVE ARTERY: Primary | ICD-10-CM

## 2024-11-07 DIAGNOSIS — I48.91 ATRIAL FIBRILLATION, UNSPECIFIED TYPE (HCC): ICD-10-CM

## 2024-11-07 PROCEDURE — 3074F SYST BP LT 130 MM HG: CPT | Performed by: INTERNAL MEDICINE

## 2024-11-07 PROCEDURE — 1123F ACP DISCUSS/DSCN MKR DOCD: CPT | Performed by: INTERNAL MEDICINE

## 2024-11-07 PROCEDURE — 93000 ELECTROCARDIOGRAM COMPLETE: CPT | Performed by: INTERNAL MEDICINE

## 2024-11-07 PROCEDURE — 99214 OFFICE O/P EST MOD 30 MIN: CPT | Performed by: INTERNAL MEDICINE

## 2024-11-07 PROCEDURE — 3078F DIAST BP <80 MM HG: CPT | Performed by: INTERNAL MEDICINE

## 2024-11-07 NOTE — PATIENT INSTRUCTIONS
Thank you for choosing [facility] for your cardiac care.    During your visit today, we reviewed and confirmed your cardiac medications along with  medication prescribed by your other healthcare team members. Please be sure to discuss any  changes to medication with your providers.    Please bring a list of ALL medications (or the bottles) with you to EVERY appointment.  Also include vitamins and over-the-counter medications.    If you need refills for any cardiac medications, please call your pharmacy and they will reach out to us electronically.    Did your provider order testing today? If yes, then you will receive your results in three  possible ways. You can receive a [employer] message, a phone call, or letter in the mail. Please  note, if you are an active [employer] user, some of your testing will be available within 1-2 days.    Finally, please know that it is good for your heart to exercise and follow a healthy, low-fat diet  as advised by your physician and health care providers.    If you are experiencing a medical emergency, please call 911 immediately.    It's easy to register for a [employer] account if you don't already have one. With a [employer]  account you can manage your health record, view test results, schedule appointments and  more.     Dr. Gonzales's clinical staff can be reached at the following phone number: [phone]    If any cardiac testing is ordered, please contact central scheduling at [phone] to get your test scheduled.

## 2024-11-13 ENCOUNTER — HOSPITAL ENCOUNTER (OUTPATIENT)
Dept: PHYSICAL THERAPY | Age: 69
Setting detail: THERAPIES SERIES
Discharge: HOME | End: 2024-11-13
Payer: COMMERCIAL

## 2024-11-13 PROCEDURE — 97110 THERAPEUTIC EXERCISES: CPT

## 2024-11-13 PROCEDURE — 97140 MANUAL THERAPY 1/> REGIONS: CPT

## 2024-11-13 PROCEDURE — 97012 MECHANICAL TRACTION THERAPY: CPT

## 2024-11-13 NOTE — FLOWSHEET NOTE
[facility]- Outpatient Rehabilitation and Therapy [address], [place] [address] office: [phone] fax: [phone]       Physical Therapy: TREATMENT/PROGRESS NOTE   Patient: Cem Gentile (69 y.o. male)   Examination Date: 2024   :  1955 MRN: [id number]   Visit #: 8   Insurance Allowable Auth Needed   20 []Yes    [x]No    Insurance: Payor: [employer] / Plan: [facility] CHOICE PLUS / Product Type: *No Product type* /   Insurance ID: [id number] - (Commercial)  Secondary Insurance (if applicable):    Treatment Diagnosis:     ICD-10-CM    1. Neck pain  M54.2          Medical Diagnosis:  Neck pain on left side [M54.2]  Cervical radiculopathy [M54.12]   Referring Physician: Coretta Deleon MD  PCP: Coretta Deleon MD     Plan of care signed (Y/N): Y    Date of Patient follow up with Physician: not scheduled     Plan of Care Report: NO  POC update due: (10 visits /OR AUTH LIMITS, whichever is less)  2024                                             Medical History:  Comorbidities:  Hypertension  Other Cardiovascular Conditions: hx of MI  Relevant Medical History:                                           Precautions/ Contra-indications:           Latex allergy:  NO  Pacemaker:    NO  Contraindications for Manipulation: None  Date of Surgery: n/a  Other:    Red Flags:  None    Suicide Screening:   The patient did not verbalize a primary behavioral concern, suicidal ideation, suicidal intent, or demonstrate suicidal behaviors.    Preferred Language for Healthcare:   [x] English       [] other:    SUBJECTIVE EXAMINATION     Patient stated complaint: Pt states last week with the traction machine, he felt a significant pull towards the end but notes he was in a rush to get to work so he just got up and left. However it took him a long time to recover from that extra pull, he felt a lot of ? pain. However today he feels back to his normal, improved self.        Test

## 2024-11-15 DIAGNOSIS — E78.5 HYPERLIPIDEMIA, UNSPECIFIED HYPERLIPIDEMIA TYPE: ICD-10-CM

## 2024-11-15 DIAGNOSIS — E78.2 MIXED HYPERLIPIDEMIA: ICD-10-CM

## 2024-11-15 DIAGNOSIS — I10 HTN (HYPERTENSION), BENIGN: ICD-10-CM

## 2024-11-15 DIAGNOSIS — I10 ESSENTIAL HYPERTENSION: ICD-10-CM

## 2024-11-15 DIAGNOSIS — I25.10 CORONARY ARTERY DISEASE INVOLVING NATIVE CORONARY ARTERY OF NATIVE HEART WITHOUT ANGINA PECTORIS: ICD-10-CM

## 2024-11-15 DIAGNOSIS — I48.91 ATRIAL FIBRILLATION, UNSPECIFIED TYPE (HCC): ICD-10-CM

## 2024-11-15 LAB
ANION GAP SERPL CALCULATED.3IONS-SCNC: 8 MMOL/L (ref 3–16)
BUN SERPL-MCNC: 12 MG/DL (ref 7–20)
CALCIUM SERPL-MCNC: 9 MG/DL (ref 8.3–10.6)
CHLORIDE SERPL-SCNC: 103 MMOL/L (ref 99–110)
CHOLEST SERPL-MCNC: 174 MG/DL (ref 0–199)
CO2 SERPL-SCNC: 28 MMOL/L (ref 21–32)
CREAT SERPL-MCNC: 0.9 MG/DL (ref 0.8–1.3)
DEPRECATED RDW RBC AUTO: 13.3 % (ref 12.4–15.4)
GFR SERPLBLD CREATININE-BSD FMLA CKD-EPI: >90 ML/MIN/{1.73_M2}
GLUCOSE SERPL-MCNC: 93 MG/DL (ref 70–99)
HCT VFR BLD AUTO: 39.1 % (ref 40.5–52.5)
HDLC SERPL-MCNC: 71 MG/DL (ref 40–60)
HGB BLD-MCNC: 12.7 G/DL (ref 13.5–17.5)
LDL CHOLESTEROL: 90 MG/DL
MCH RBC QN AUTO: 30.1 PG (ref 26–34)
MCHC RBC AUTO-ENTMCNC: 32.5 G/DL (ref 31–36)
MCV RBC AUTO: 92.7 FL (ref 80–100)
PLATELET # BLD AUTO: 205 K/UL (ref 135–450)
PMV BLD AUTO: 8.8 FL (ref 5–10.5)
POTASSIUM SERPL-SCNC: 3.9 MMOL/L (ref 3.5–5.1)
RBC # BLD AUTO: 4.21 M/UL (ref 4.2–5.9)
SODIUM SERPL-SCNC: 139 MMOL/L (ref 136–145)
TRIGL SERPL-MCNC: 63 MG/DL (ref 0–150)
VLDLC SERPL CALC-MCNC: 13 MG/DL
WBC # BLD AUTO: 4.7 K/UL (ref 4–11)

## 2024-11-19 RX ORDER — TADALAFIL 20 MG/1
40 TABLET ORAL DAILY PRN
Qty: 90 TABLET | Refills: 3 | Status: CANCELLED | OUTPATIENT
Start: 2024-11-19

## 2024-11-19 RX ORDER — ROSUVASTATIN CALCIUM 20 MG/1
40 TABLET, COATED ORAL NIGHTLY
Qty: 90 TABLET | Refills: 3 | Status: SHIPPED | OUTPATIENT
Start: 2024-11-19

## 2024-11-20 ENCOUNTER — HOSPITAL ENCOUNTER (OUTPATIENT)
Dept: PHYSICAL THERAPY | Age: 69
Setting detail: THERAPIES SERIES
Discharge: HOME | End: 2024-11-20
Payer: COMMERCIAL

## 2024-11-20 PROCEDURE — 97110 THERAPEUTIC EXERCISES: CPT

## 2024-11-20 PROCEDURE — 97012 MECHANICAL TRACTION THERAPY: CPT

## 2024-11-20 PROCEDURE — 97140 MANUAL THERAPY 1/> REGIONS: CPT

## 2024-11-20 NOTE — FLOWSHEET NOTE
[facility]- Outpatient Rehabilitation and Therapy [address], [place] [address] office: [phone] fax: [phone]       Physical Therapy: TREATMENT/PROGRESS NOTE   Patient: Cem Gentile (69 y.o. male)   Examination Date: 2024   :  1955 MRN: [id number]   Visit #: 9   Insurance Allowable Auth Needed   20 []Yes    [x]No    Insurance: Payor: [employer] / Plan: [facility] CHOICE PLUS / Product Type: *No Product type* /   Insurance ID: [id number] - (Commercial)  Secondary Insurance (if applicable):    Treatment Diagnosis:     ICD-10-CM    1. Neck pain  M54.2          Medical Diagnosis:  Neck pain on left side [M54.2]  Cervical radiculopathy [M54.12]   Referring Physician: Coretta Deleon MD  PCP: Coretta Deleon MD     Plan of care signed (Y/N): Y    Date of Patient follow up with Physician: not scheduled     Plan of Care Report: NO  POC update due: (10 visits /OR AUTH LIMITS, whichever is less)  2024                                             Medical History:  Comorbidities:  Hypertension  Other Cardiovascular Conditions: hx of MI  Relevant Medical History:                                           Precautions/ Contra-indications:           Latex allergy:  NO  Pacemaker:    NO  Contraindications for Manipulation: None  Date of Surgery: n/a  Other:    Red Flags:  None    Suicide Screening:   The patient did not verbalize a primary behavioral concern, suicidal ideation, suicidal intent, or demonstrate suicidal behaviors.    Preferred Language for Healthcare:   [x] English       [] other:    SUBJECTIVE EXAMINATION     Patient stated complaint: Pt reports he feels significantly improved overall. Notes he gets a little soreness in the afternoon at work but wouldn't call it pain.        Test used Initial score  2024   Pain Summary VAS 2-7 0/10   Functional questionnaire Neck Disability Index NV 18% disability   Other:                OBJECTIVE

## 2024-12-03 ENCOUNTER — OFFICE VISIT (OUTPATIENT)
Dept: CARDIOLOGY CLINIC | Age: 69
End: 2024-12-03
Payer: COMMERCIAL

## 2024-12-03 VITALS
WEIGHT: 262 LBS | DIASTOLIC BLOOD PRESSURE: 76 MMHG | SYSTOLIC BLOOD PRESSURE: 124 MMHG | BODY MASS INDEX: 34.57 KG/M2 | HEART RATE: 70 BPM

## 2024-12-03 DIAGNOSIS — I25.10 CORONARY ARTERY DISEASE INVOLVING NATIVE CORONARY ARTERY OF NATIVE HEART WITHOUT ANGINA PECTORIS: Primary | ICD-10-CM

## 2024-12-03 PROCEDURE — 3078F DIAST BP <80 MM HG: CPT | Performed by: NURSE PRACTITIONER

## 2024-12-03 PROCEDURE — 99214 OFFICE O/P EST MOD 30 MIN: CPT | Performed by: NURSE PRACTITIONER

## 2024-12-03 PROCEDURE — 3074F SYST BP LT 130 MM HG: CPT | Performed by: NURSE PRACTITIONER

## 2024-12-03 PROCEDURE — 1123F ACP DISCUSS/DSCN MKR DOCD: CPT | Performed by: NURSE PRACTITIONER

## 2024-12-04 NOTE — TELEPHONE ENCOUNTER
I Medication Refills:    Medication  lisinopril (PRINIVIL;ZESTRIL) 2.5 MG tablet [99805]  lisinopril (PRINIVIL;ZESTRIL) 2.5 MG tablet [[id number]]    Order Details  Dose: 2.5 mg Route: Oral Frequency: DAILY   Dispense Quantity: 90 tablet Refills: 3          Sig: Take 1 tablet by mouth daily       Pharmacy    [facility] - [place], [place] - [address] [place] [address] [facility] [phone] - F [phone]  [facility] [address] [address]  Phone: [phone]  Fax: [phone]         Last Office Visit: 12/03/24    Next Office Visit: 03/18/25    Last Refill: 11/29/23    Last Labs: 11/15/24

## 2024-12-05 RX ORDER — LISINOPRIL 2.5 MG/1
2.5 TABLET ORAL DAILY
Qty: 90 TABLET | Refills: 3 | Status: SHIPPED | OUTPATIENT
Start: 2024-12-05

## 2024-12-18 ENCOUNTER — OFFICE VISIT (OUTPATIENT)
Dept: ORTHOPEDIC SURGERY | Age: 69
End: 2024-12-18

## 2024-12-18 VITALS — RESPIRATION RATE: 16 BRPM | WEIGHT: 262 LBS | HEIGHT: 73 IN | BODY MASS INDEX: 34.72 KG/M2

## 2024-12-18 DIAGNOSIS — M65.30 TRIGGER FINGER, ACQUIRED: Primary | ICD-10-CM

## 2024-12-18 RX ORDER — LIDOCAINE HYDROCHLORIDE 10 MG/ML
0.5 INJECTION, SOLUTION INFILTRATION; PERINEURAL ONCE
Status: COMPLETED | OUTPATIENT
Start: 2024-12-18 | End: 2024-12-18

## 2024-12-18 RX ORDER — TRIAMCINOLONE ACETONIDE 40 MG/ML
20 INJECTION, SUSPENSION INTRA-ARTICULAR; INTRAMUSCULAR ONCE
Status: COMPLETED | OUTPATIENT
Start: 2024-12-18 | End: 2024-12-18

## 2024-12-18 RX ADMIN — TRIAMCINOLONE ACETONIDE 20 MG: 40 INJECTION, SUSPENSION INTRA-ARTICULAR; INTRAMUSCULAR at 11:40

## 2024-12-18 RX ADMIN — LIDOCAINE HYDROCHLORIDE 0.5 ML: 10 INJECTION, SOLUTION INFILTRATION; PERINEURAL at 11:39

## 2024-12-18 RX ADMIN — LIDOCAINE HYDROCHLORIDE 0.5 ML: 10 INJECTION, SOLUTION INFILTRATION; PERINEURAL at 11:40

## 2024-12-18 NOTE — PROGRESS NOTES
I last examined this patient 7 months ago at which time I injected the right index finger for treatment of trigger finger. He obtained prompt and complete relief of all symptoms.  Unfortunately, the condition has recurred in that finger and started in his left middle finger and the patient returns to the office requesting additional treatment.  He complains of pain, swelling, catching and stiffness of the digit for the past 1 week.  Symptoms have become worse recently.    The patient's social history, past medical history, family history, medications, allergies and review of systems have been reviewed, dated 12/18/24 and are recorded in the chart.    I have personally examined and reviewed all previous imaging studies, laboratory tests(BMP, Lipids, CBC) and medical encounters pertinent to this patient's visit today.     On examination there is mild soft tissue swelling of these digits.  There is no deformity. There is tenderness, thickening and nodularity at the base of the flexor tendon sheaths.  Range of motion is slightly limited in flexion and extension.  The digits stick in flexion and pop into extension, accompanied by some pain. Skin is intact without lesions.  Distal circulation and sensation are intact.  Muscle strength and coordination are normal.  Reflexes and present bilaterally.  Joints are stable.  There are no subcutaneous nodules or enlarged epitrochlear lymph nodes.     Impression: Left middle finger trigger digit.                       Recurrent right index finger trigger digit.     The nature of this medical problem is fully discussed with the patient, including all treatment options, as well as the pertinent risks, complications, prognosis and post treatment care. All questions are answered. The bilateral  hands are prepared with Betadine and alcohol and the flexor tendon sheaths of the left middle finger and right index finger are each injected with 1/2 milliliter of 1% lidocaine and 20 mg.of

## 2025-01-30 ENCOUNTER — TELEPHONE (OUTPATIENT)
Dept: PRIMARY CARE CLINIC | Age: 70
End: 2025-01-30

## 2025-01-30 DIAGNOSIS — R21 RASH: ICD-10-CM

## 2025-01-30 RX ORDER — CLOTRIMAZOLE AND BETAMETHASONE DIPROPIONATE 10; .64 MG/G; MG/G
CREAM TOPICAL
Qty: 90 G | Refills: 2 | Status: SHIPPED | OUTPATIENT
Start: 2025-01-30

## 2025-01-30 NOTE — TELEPHONE ENCOUNTER
Pt called in for meds refill.Please contact Pt when completed.  clotrimazole-betamethasone (LOTRISONE) 1-0.05 % cream [[id number]]   [facility]Y #[id number] - [place], [place] - [address] - P [phone] - F [phone] [[id number]]

## 2025-01-30 NOTE — TELEPHONE ENCOUNTER
Medication:   Requested Prescriptions     Pending Prescriptions Disp Refills    clotrimazole-betamethasone (LOTRISONE) 1-0.05 % cream [Pharmacy Med Name: Clotrimazole-Betamethasone 1-0.05 % External Cream] 90 g 0     Sig: APPLY TOPICALLY TWICE DAILY     Last Filled:  07/30/24    Last appt: 8/22/2024   Next appt:     Last OARRS:        No data to display

## 2025-02-03 RX ORDER — DOFETILIDE 0.5 MG/1
500 CAPSULE ORAL EVERY 12 HOURS SCHEDULED
Qty: 180 CAPSULE | Refills: 3 | Status: SHIPPED | OUTPATIENT
Start: 2025-02-03 | End: 2025-02-07 | Stop reason: SDUPTHER

## 2025-02-03 NOTE — TELEPHONE ENCOUNTER
Requested Prescriptions     Pending Prescriptions Disp Refills    dofetilide (TIKOSYN) 500 MCG capsule 180 capsule 3     Sig: Take 1 capsule by mouth every 12 hours            Checked Correct Pharmacy: Yes    Any changes since last refill? No     Number: 180  Refills: 3    Last OV: 12/3/2024 Provider: KYLAH    Next OV: 3/25/2025 Provider: HEATHER    Last Labs:   CBC:   Lab Results   Component Value Date    WBC 4.7 11/15/2024    HGB 12.7 (L) 11/15/2024    HCT 39.1 (L) 11/15/2024    MCV 92.7 11/15/2024     11/15/2024     BMP:   Lab Results   Component Value Date/Time     11/15/2024 08:07 AM    K 3.9 11/15/2024 08:07 AM    K 4.3 04/24/2021 04:49 AM     11/15/2024 08:07 AM    CO2 28 11/15/2024 08:07 AM    BUN 12 11/15/2024 08:07 AM    CREATININE 0.9 11/15/2024 08:07 AM    GLUCOSE 93 11/15/2024 08:07 AM    CALCIUM 9.0 11/15/2024 08:07 AM    LABGLOM >90 11/15/2024 08:07 AM    LABGLOM >60 02/21/2024 11:45 AM

## 2025-02-03 NOTE — TELEPHONE ENCOUNTER
Medication refill:     Medication  dofetilide (TIKOSYN) 500 MCG capsule [[id number]]  dofetilide (TIKOSYN) 500 MCG capsule [[id number]]    Order Details  Dose: 500 mcg Route: Oral Frequency: EVERY 12 HOURS SCHEDULED   Dispense Quantity: 180 capsule Refills: 1          Sig: Take 1 capsule by mouth every 12 hours       Pharmacy  [facility]/pharmacy #[id number] - [place], [place] - [address] - P [phone] - F [phone]  [address], [place] [address]  Phone: [phone]  Fax: [phone]     Last visit: 12/3/24  Next visit: 3/25/25  Refill: 11/4/24

## 2025-02-05 ENCOUNTER — OFFICE VISIT (OUTPATIENT)
Dept: ORTHOPEDIC SURGERY | Age: 70
End: 2025-02-05
Payer: COMMERCIAL

## 2025-02-05 VITALS — HEIGHT: 73 IN | WEIGHT: 262 LBS | BODY MASS INDEX: 34.72 KG/M2 | RESPIRATION RATE: 14 BRPM

## 2025-02-05 DIAGNOSIS — S63.501A SPRAIN OF RIGHT WRIST, INITIAL ENCOUNTER: Primary | ICD-10-CM

## 2025-02-05 PROCEDURE — 99214 OFFICE O/P EST MOD 30 MIN: CPT | Performed by: ORTHOPAEDIC SURGERY

## 2025-02-05 PROCEDURE — 1123F ACP DISCUSS/DSCN MKR DOCD: CPT | Performed by: ORTHOPAEDIC SURGERY

## 2025-02-05 PROCEDURE — L3908 WHO COCK-UP NONMOLDE PRE OTS: HCPCS | Performed by: ORTHOPAEDIC SURGERY

## 2025-02-05 NOTE — PROGRESS NOTES
This 69 y.o.  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  He noticed pain, slight swelling, and no bruising.  He was not evaluated at the ED.  Xrays were not obtained.  Because of some persisting dorsal wrist pain and swelling the patient  presents for hand/upper extremity evaluation and treatment. There is no history of additional significant injury.  Symptoms have not changed since the date of injury. The pain assessment has been reviewed and is correct.    The patient's social history, past medical history, family history, medications, allergies and review of systems, entered 2/5/25,  have been reviewed, and dated and are recorded in the chart.    On physical examination the patient is Height: 185.4 cm (6' 1\") tall and weighs Weight - Scale: 118.8 kg (262 lb).  Respirations are 18 per minute.  The patient is well nourished, is oriented to time and place, demonstrates appropriate mood and affect as well as normal gait and station.  There is mild soft tissue swelling present about the right wrist, dorsal.  There is no discoloration.  There is no deformity.   Mild tenderness is present on palpation in the area of the right wrist, dorsal, over the scapholunate ligament, but not over the distal radius or scaphoid.  Range of motion of the wrist is mildly limited only on the right and is accompanied by mild pain.  Skin is intact, as is distal circulation and sensation.  Gross muscle strength is limited only on the right   Hand and wrist joints are stable.  There are no subcutaneous nodules or enlarged epitrochlear lymph nodes.    I have personally reviewed and interpreted all previous external imaging studies, laboratory tests(BMP, Lipids, CBC), diagnostic procedures and medical encounters pertinent to this patient's visit today.    Xrays: AP, lateral and oblique Xrays

## 2025-02-07 ENCOUNTER — TELEPHONE (OUTPATIENT)
Dept: CARDIOLOGY CLINIC | Age: 70
End: 2025-02-07

## 2025-02-07 RX ORDER — DOFETILIDE 0.5 MG/1
500 CAPSULE ORAL EVERY 12 HOURS SCHEDULED
Qty: 180 CAPSULE | Refills: 3 | Status: SHIPPED | OUTPATIENT
Start: 2025-02-07

## 2025-02-07 NOTE — TELEPHONE ENCOUNTER
Prescription for Dofetilide needs to be sent to [facility] instead of local pharmacy.    Medication  dofetilide (TIKOSYN) 500 MCG capsule [[id number]]  dofetilide (TIKOSYN) 500 MCG capsule [[id number]]    Order Details  Dose: 500 mcg Route: Oral Frequency: EVERY 12 HOURS SCHEDULED   Dispense Quantity: 180 capsule Refills: 3          Sig: Take 1 capsule by mouth every 12 hours       Pharmacy    [facility] - [place], [place] - [address] [place] [address] [phone] - F [phone]  [address] [place] [id number]  Phone: [phone]  Fax: [phone]

## 2025-03-11 NOTE — PROGRESS NOTES
preparation, reviewing diagnostic testing, other provider notes and coordinating patient care.

## 2025-03-17 DIAGNOSIS — I10 ESSENTIAL HYPERTENSION: ICD-10-CM

## 2025-03-17 DIAGNOSIS — E78.2 MIXED HYPERLIPIDEMIA: ICD-10-CM

## 2025-03-17 DIAGNOSIS — I25.10 CORONARY ARTERY DISEASE INVOLVING NATIVE CORONARY ARTERY OF NATIVE HEART WITHOUT ANGINA PECTORIS: ICD-10-CM

## 2025-03-18 RX ORDER — ROSUVASTATIN CALCIUM 20 MG/1
40 TABLET, COATED ORAL NIGHTLY PRN
Qty: 180 TABLET | Refills: 3 | Status: SHIPPED | OUTPATIENT
Start: 2025-03-18

## 2025-03-18 NOTE — TELEPHONE ENCOUNTER
Requested Prescriptions     Pending Prescriptions Disp Refills    rosuvastatin (CRESTOR) 20 MG tablet [Pharmacy Med Name: Rosuvastatin Calcium 20 MG Oral Tablet] 180 tablet 3     Sig: TAKE 2 TABLETS BY MOUTH EVERY  NIGHT            Checked Correct Pharmacy: Yes    Any changes since last refill? No     Number: 90  Refills: 3    Last OV: 12/3/2024 Provider: KYLAH    Next OV: 3/25/2025 Provider: HEATHER    Last Labs:   Lipid:   Lab Results   Component Value Date    CHOL 125 04/23/2021    TRIG 68 04/23/2021    HDL 71 (H) 11/15/2024    LDL 90 11/15/2024    VLDL 13 11/15/2024    CHOLHDLRATIO 3.0 02/13/2019

## 2025-03-25 ENCOUNTER — OFFICE VISIT (OUTPATIENT)
Dept: CARDIOLOGY CLINIC | Age: 70
End: 2025-03-25
Payer: COMMERCIAL

## 2025-03-25 VITALS
BODY MASS INDEX: 35.2 KG/M2 | DIASTOLIC BLOOD PRESSURE: 66 MMHG | SYSTOLIC BLOOD PRESSURE: 120 MMHG | WEIGHT: 266.8 LBS | HEART RATE: 58 BPM

## 2025-03-25 DIAGNOSIS — Z79.01 ON CONTINUOUS ORAL ANTICOAGULATION: ICD-10-CM

## 2025-03-25 DIAGNOSIS — I48.0 PAROXYSMAL ATRIAL FIBRILLATION (HCC): Primary | ICD-10-CM

## 2025-03-25 DIAGNOSIS — Z79.899 ENCOUNTER FOR MONITORING DOFETILIDE THERAPY: ICD-10-CM

## 2025-03-25 DIAGNOSIS — I48.3 TYPICAL ATRIAL FLUTTER (HCC): ICD-10-CM

## 2025-03-25 DIAGNOSIS — I47.29 NSVT (NONSUSTAINED VENTRICULAR TACHYCARDIA) (HCC): ICD-10-CM

## 2025-03-25 DIAGNOSIS — Z51.81 ENCOUNTER FOR MONITORING DOFETILIDE THERAPY: ICD-10-CM

## 2025-03-25 DIAGNOSIS — I42.8 NICM (NONISCHEMIC CARDIOMYOPATHY) (HCC): ICD-10-CM

## 2025-03-25 DIAGNOSIS — I48.0 PAROXYSMAL ATRIAL FIBRILLATION (HCC): ICD-10-CM

## 2025-03-25 DIAGNOSIS — I25.10 CAD IN NATIVE ARTERY: ICD-10-CM

## 2025-03-25 DIAGNOSIS — I50.22 CHRONIC SYSTOLIC CHF (CONGESTIVE HEART FAILURE) (HCC): ICD-10-CM

## 2025-03-25 DIAGNOSIS — I10 HTN (HYPERTENSION), BENIGN: ICD-10-CM

## 2025-03-25 PROCEDURE — 93000 ELECTROCARDIOGRAM COMPLETE: CPT | Performed by: NURSE PRACTITIONER

## 2025-03-25 PROCEDURE — 3078F DIAST BP <80 MM HG: CPT | Performed by: NURSE PRACTITIONER

## 2025-03-25 PROCEDURE — 1123F ACP DISCUSS/DSCN MKR DOCD: CPT | Performed by: NURSE PRACTITIONER

## 2025-03-25 PROCEDURE — 99214 OFFICE O/P EST MOD 30 MIN: CPT | Performed by: NURSE PRACTITIONER

## 2025-03-25 PROCEDURE — 3074F SYST BP LT 130 MM HG: CPT | Performed by: NURSE PRACTITIONER

## 2025-03-26 ENCOUNTER — RESULTS FOLLOW-UP (OUTPATIENT)
Dept: CARDIOLOGY CLINIC | Age: 70
End: 2025-03-26

## 2025-03-26 LAB
ANION GAP SERPL CALCULATED.3IONS-SCNC: 9 MMOL/L (ref 3–16)
BUN SERPL-MCNC: 16 MG/DL (ref 7–20)
CALCIUM SERPL-MCNC: 9.2 MG/DL (ref 8.3–10.6)
CHLORIDE SERPL-SCNC: 104 MMOL/L (ref 99–110)
CO2 SERPL-SCNC: 28 MMOL/L (ref 21–32)
CREAT SERPL-MCNC: 0.8 MG/DL (ref 0.8–1.3)
GFR SERPLBLD CREATININE-BSD FMLA CKD-EPI: >90 ML/MIN/{1.73_M2}
GLUCOSE SERPL-MCNC: 99 MG/DL (ref 70–99)
MAGNESIUM SERPL-MCNC: 2.17 MG/DL (ref 1.8–2.4)
POTASSIUM SERPL-SCNC: 4 MMOL/L (ref 3.5–5.1)
SODIUM SERPL-SCNC: 141 MMOL/L (ref 136–145)

## 2025-05-12 NOTE — TELEPHONE ENCOUNTER
Rx refill request    Medication  metoprolol succinate (TOPROL XL) 25 MG extended release tablet [[id number]]  metoprolol succinate (TOPROL XL) 25 MG extended release tablet [[id number]]    Order Details  Dose: 25 mg Route: Oral Frequency: DAILY   Dispense Quantity: 90 tablet Refills: 3    Note to Pharmacy: Requesting 1 year supply         Sig: TAKE 1 TABLET BY MOUTH  DAILY       Pharmacy  [facility] #[id number] - [place], [place] - [address] - [place] [phone] - F [phone] [[id number]]

## 2025-05-13 RX ORDER — METOPROLOL SUCCINATE 25 MG/1
25 TABLET, EXTENDED RELEASE ORAL DAILY
Qty: 90 TABLET | Refills: 3 | Status: SHIPPED | OUTPATIENT
Start: 2025-05-13

## 2025-05-13 NOTE — TELEPHONE ENCOUNTER
Requested Prescriptions     Pending Prescriptions Disp Refills    metoprolol succinate (TOPROL XL) 25 MG extended release tablet 90 tablet 3     Sig: Take 1 tablet by mouth daily            Checked Correct Pharmacy: Yes    Any changes since last refill? No     Number: 90  Refills: 3    Last OV: 3/25/2025 Provider: HEATHER    Next OV: 6/19/2025 Provider: ADARSH    Last Labs:   CBC:  Lab Results   Component Value Date    WBC 4.7 11/15/2024    HGB 12.7 (L) 11/15/2024    HCT 39.1 (L) 11/15/2024    MCV 92.7 11/15/2024     11/15/2024    LYMPHOPCT 16.6 02/21/2024    RBC 4.21 11/15/2024    MCH 30.1 11/15/2024    MCHC 32.5 11/15/2024    RDW 13.3 11/15/2024           BMP:  Lab Results   Component Value Date/Time     03/25/2025 04:17 PM    K 4.0 03/25/2025 04:17 PM    K 4.3 04/24/2021 04:49 AM     03/25/2025 04:17 PM    CO2 28 03/25/2025 04:17 PM    BUN 16 03/25/2025 04:17 PM    CREATININE 0.8 03/25/2025 04:17 PM    GLUCOSE 99 03/25/2025 04:17 PM    CALCIUM 9.2 03/25/2025 04:17 PM    LABGLOM >90 03/25/2025 04:17 PM    LABGLOM >60 02/21/2024 11:45 AM      Magnesium:  Lab Results   Component Value Date/Time    MG 2.17 03/25/2025 04:17 PM

## 2025-05-19 NOTE — TELEPHONE ENCOUNTER
Requested Prescriptions     Pending Prescriptions Disp Refills    potassium chloride (KLOR-CON M) 20 MEQ extended release tablet [Pharmacy Med Name: Potassium Chloride Krystle ER 20 MEQ Oral Tablet Extended Release] 90 tablet 3     Sig: TAKE 1 TABLET BY MOUTH DAILY            Checked Correct Pharmacy: Yes    Any changes since last refill? No     Number: 90  Refills: 3    Last OV: 03.25.2025 Provider: HEATHER    Next OV: 06.19.205 Provider: ADARSH    Last Labs:   BMP:  Lab Results   Component Value Date/Time     03/25/2025 04:17 PM    K 4.0 03/25/2025 04:17 PM    K 4.3 04/24/2021 04:49 AM     03/25/2025 04:17 PM    CO2 28 03/25/2025 04:17 PM    BUN 16 03/25/2025 04:17 PM    CREATININE 0.8 03/25/2025 04:17 PM    GLUCOSE 99 03/25/2025 04:17 PM    CALCIUM 9.2 03/25/2025 04:17 PM    LABGLOM >90 03/25/2025 04:17 PM    LABGLOM >60 02/21/2024 11:45 AM      Magnesium:  Lab Results   Component Value Date/Time    MG 2.17 03/25/2025 04:17 PM

## 2025-05-20 RX ORDER — POTASSIUM CHLORIDE 1500 MG/1
20 TABLET, EXTENDED RELEASE ORAL DAILY
Qty: 90 TABLET | Refills: 3 | Status: SHIPPED | OUTPATIENT
Start: 2025-05-20

## 2025-06-12 NOTE — PATIENT INSTRUCTIONS
Thank you for choosing [facility] for your cardiac care.    During your visit today, we reviewed and confirmed your cardiac medications along with  medication prescribed by your other healthcare team members. Please be sure to discuss any  changes to medication with your providers.    Please bring a list of ALL medications (or the bottles) with you to EVERY appointment.  Also include vitamins and over-the-counter medications.    If you need refills for any cardiac medications, please call your pharmacy and they will reach out to us electronically.    Did your provider order testing today? If yes, then you will receive your results in three  possible ways. You can receive a [employer] message, a phone call, or letter in the mail. Please  note, if you are an active [employer] user, some of your testing will be available within 1-2 days.    Finally, please know that it is good for your heart to exercise and follow a healthy, low-fat diet  as advised by your physician and health care providers.    If you are experiencing a medical emergency, please call 911 immediately.    It's easy to register for a [employer] account if you don't already have one. With a [employer]  account you can manage your health record, view test results, schedule appointments and  more.     Dr. Gonzales's clinical staff can be reached at the following phone number: [phone]    If any cardiac testing is ordered, please contact central scheduling at [phone] to get your test scheduled.     Discontinue Lasix

## 2025-06-12 NOTE — PROGRESS NOTES
[facility]     Outpatient Cardiology         Patient Name:  Cem Gentile  Primary Care Physician: Coretta Deleon MD  06/19/25     Assessment & Plan    Assessment / Plan:     ASHD  status post PCI in 2021.  Has worsening leg swelling.  Has some tingling in fingers.  [facility]  Will plan for Lexiscan stress test to exclude any ischemia and worsening LV function.  Assess LVEF on the stress test.   Discussed salt and fluid restriction.  Switch Lasix to p.o. Demadex.  BMP and lipid panel in a week.    Paroxysmal Q-onz-zlznenff Eliquis and Tikosyn.  Clinically stable.  Management per EP.    Essential hypertension-blood pressure readings are acceptable.  Continue lisinopril.  Continue Toprol.    Follow-up in 6 months at [place] office.                  Chief Complaint:     Chief Complaint   Patient presents with    Coronary Artery Disease       History of Present Illness:       HPI     Cem Gentile is a 69 y.o. male with PMH of CAD s/p PCI x 2 (2021),  NATHAN uses CPAP, HTN, paroxysmal Afib, cardiomyopathy and HLD here for a follow up.      Today he reports he is doing well. Lately he has noticed that the Lasix is not as effective in preventing  edema in his lower legs. Lately while sleeping he has noticed some finger tingling.    Patient denies any chest pain, shortness of breath, palpitations, presyncope or syncope. No TIA. No claudication. No recent hospitalizations        Reviewed medications, tests and labs    PMH  Past Medical History:   Diagnosis Date    CAD (coronary artery disease)     Hyperlipidemia     Hypertension     S/P colonoscopy sept 2010    BN    Shoulder injury     LEFT & RIGHT /Dr Knox (football)    Torn meniscus     LT /Dr Knox (football)       PSH  Past Surgical History:   Procedure Laterality Date    BACK SURGERY Bilateral 2014    SCIATIC NERVE     COLONOSCOPY N/A 09/03/2019    COLONOSCOPY WITH BIOPSY performed by Gisele Zelaya MD at [facility]

## 2025-06-19 ENCOUNTER — OFFICE VISIT (OUTPATIENT)
Dept: CARDIOLOGY CLINIC | Age: 70
End: 2025-06-19
Payer: COMMERCIAL

## 2025-06-19 VITALS
DIASTOLIC BLOOD PRESSURE: 80 MMHG | OXYGEN SATURATION: 96 % | WEIGHT: 267.6 LBS | BODY MASS INDEX: 35.31 KG/M2 | SYSTOLIC BLOOD PRESSURE: 132 MMHG | HEART RATE: 72 BPM

## 2025-06-19 DIAGNOSIS — E78.5 HYPERLIPIDEMIA, UNSPECIFIED HYPERLIPIDEMIA TYPE: ICD-10-CM

## 2025-06-19 DIAGNOSIS — I42.9 CARDIOMYOPATHY, UNSPECIFIED TYPE (HCC): ICD-10-CM

## 2025-06-19 DIAGNOSIS — I50.22 CHRONIC SYSTOLIC CHF (CONGESTIVE HEART FAILURE) (HCC): Primary | ICD-10-CM

## 2025-06-19 PROCEDURE — 3075F SYST BP GE 130 - 139MM HG: CPT | Performed by: INTERNAL MEDICINE

## 2025-06-19 PROCEDURE — 99214 OFFICE O/P EST MOD 30 MIN: CPT | Performed by: INTERNAL MEDICINE

## 2025-06-19 PROCEDURE — 3079F DIAST BP 80-89 MM HG: CPT | Performed by: INTERNAL MEDICINE

## 2025-06-19 PROCEDURE — 1123F ACP DISCUSS/DSCN MKR DOCD: CPT | Performed by: INTERNAL MEDICINE

## 2025-06-19 RX ORDER — TORSEMIDE 20 MG/1
40 TABLET ORAL DAILY
Qty: 180 TABLET | Refills: 3 | Status: SHIPPED | OUTPATIENT
Start: 2025-06-19

## 2025-07-01 DIAGNOSIS — E78.5 HYPERLIPIDEMIA, UNSPECIFIED HYPERLIPIDEMIA TYPE: ICD-10-CM

## 2025-07-01 DIAGNOSIS — I50.22 CHRONIC SYSTOLIC CHF (CONGESTIVE HEART FAILURE) (HCC): ICD-10-CM

## 2025-07-01 LAB
ANION GAP SERPL CALCULATED.3IONS-SCNC: 10 MMOL/L (ref 3–16)
BUN SERPL-MCNC: 20 MG/DL (ref 7–20)
CALCIUM SERPL-MCNC: 9.5 MG/DL (ref 8.3–10.6)
CHLORIDE SERPL-SCNC: 100 MMOL/L (ref 99–110)
CHOLEST SERPL-MCNC: 178 MG/DL (ref 0–199)
CO2 SERPL-SCNC: 30 MMOL/L (ref 21–32)
CREAT SERPL-MCNC: 1 MG/DL (ref 0.8–1.3)
GFR SERPLBLD CREATININE-BSD FMLA CKD-EPI: 80 ML/MIN/{1.73_M2}
GLUCOSE SERPL-MCNC: 87 MG/DL (ref 70–99)
HDLC SERPL-MCNC: 69 MG/DL (ref 40–60)
LDLC SERPL CALC-MCNC: 94 MG/DL
POTASSIUM SERPL-SCNC: 3.7 MMOL/L (ref 3.5–5.1)
SODIUM SERPL-SCNC: 140 MMOL/L (ref 136–145)
TRIGL SERPL-MCNC: 73 MG/DL (ref 0–150)
VLDLC SERPL CALC-MCNC: 15 MG/DL

## 2025-07-02 ENCOUNTER — RESULTS FOLLOW-UP (OUTPATIENT)
Dept: CARDIOLOGY | Age: 70
End: 2025-07-02

## 2025-07-02 DIAGNOSIS — E78.5 HYPERLIPIDEMIA, UNSPECIFIED HYPERLIPIDEMIA TYPE: Primary | ICD-10-CM

## 2025-07-02 RX ORDER — EZETIMIBE 10 MG/1
10 TABLET ORAL DAILY
Qty: 90 TABLET | Refills: 3 | Status: SHIPPED | OUTPATIENT
Start: 2025-07-02

## 2025-07-15 ENCOUNTER — HOSPITAL ENCOUNTER (OUTPATIENT)
Age: 70
Discharge: HOME | End: 2025-07-17
Attending: INTERNAL MEDICINE
Payer: COMMERCIAL

## 2025-07-15 ENCOUNTER — HOSPITAL ENCOUNTER (OUTPATIENT)
Dept: NUCLEAR MEDICINE | Age: 70
Discharge: HOME | End: 2025-07-15
Attending: INTERNAL MEDICINE
Payer: COMMERCIAL

## 2025-07-15 DIAGNOSIS — I42.9 CARDIOMYOPATHY, UNSPECIFIED TYPE (HCC): ICD-10-CM

## 2025-07-15 LAB
NUC STRESS EJECTION FRACTION: 68 %
NUC STRESS LV EDV: 192 ML (ref 67–155)
NUC STRESS LV ESV: 61 ML (ref 22–58)
NUC STRESS LV MASS: 193 G
NUC STRESS LV STROKE VOLUME: 131 ML
STRESS BASELINE DIAS BP: 53 MMHG
STRESS BASELINE HR: 48 BPM
STRESS BASELINE SYS BP: 123 MMHG
STRESS ESTIMATED WORKLOAD: 1 METS
STRESS EXERCISE DUR MIN: 1 MIN
STRESS EXERCISE DUR SEC: 0 SEC
STRESS PEAK DIAS BP: 53 MMHG
STRESS PEAK SYS BP: 123 MMHG
STRESS PERCENT HR ACHIEVED: 55 %
STRESS POST PEAK HR: 82 BPM
STRESS RATE PRESSURE PRODUCT: ABNORMAL BPM*MMHG
STRESS TARGET HR: 149 BPM
TID: 1.07

## 2025-07-15 PROCEDURE — 93018 CV STRESS TEST I&R ONLY: CPT | Performed by: INTERNAL MEDICINE

## 2025-07-15 PROCEDURE — 93016 CV STRESS TEST SUPVJ ONLY: CPT | Performed by: INTERNAL MEDICINE

## 2025-07-15 PROCEDURE — 6360000002 HC RX W HCPCS: Performed by: INTERNAL MEDICINE

## 2025-07-15 PROCEDURE — 93017 CV STRESS TEST TRACING ONLY: CPT

## 2025-07-15 PROCEDURE — A9502 TC99M TETROFOSMIN: HCPCS | Performed by: INTERNAL MEDICINE

## 2025-07-15 PROCEDURE — 78452 HT MUSCLE IMAGE SPECT MULT: CPT

## 2025-07-15 PROCEDURE — 78452 HT MUSCLE IMAGE SPECT MULT: CPT | Performed by: INTERNAL MEDICINE

## 2025-07-15 PROCEDURE — 3430000000 HC RX DIAGNOSTIC RADIOPHARMACEUTICAL: Performed by: INTERNAL MEDICINE

## 2025-07-15 RX ORDER — REGADENOSON 0.08 MG/ML
0.4 INJECTION, SOLUTION INTRAVENOUS
Status: COMPLETED | OUTPATIENT
Start: 2025-07-15 | End: 2025-07-15

## 2025-07-15 RX ADMIN — REGADENOSON 0.4 MG: 0.08 INJECTION, SOLUTION INTRAVENOUS at 10:03

## 2025-07-15 RX ADMIN — TETROFOSMIN 35.8 MILLICURIE: 1.38 INJECTION, POWDER, LYOPHILIZED, FOR SOLUTION INTRAVENOUS at 10:03

## 2025-07-15 RX ADMIN — TETROFOSMIN 10 MILLICURIE: 1.38 INJECTION, POWDER, LYOPHILIZED, FOR SOLUTION INTRAVENOUS at 09:00

## 2025-08-08 RX ORDER — METOPROLOL SUCCINATE 25 MG/1
25 TABLET, EXTENDED RELEASE ORAL DAILY
Qty: 90 TABLET | Refills: 3 | Status: SHIPPED | OUTPATIENT
Start: 2025-08-08

## 2025-09-02 RX ORDER — CLOPIDOGREL BISULFATE 75 MG/1
75 TABLET ORAL DAILY
Qty: 90 TABLET | Refills: 3 | Status: SHIPPED | OUTPATIENT
Start: 2025-09-02

## (undated) DEVICE — MASK O2 ADULT POM PROCEDURAL OXYGEN MASK 7FT O2 TUBING 10FT

## (undated) DEVICE — FORCEPS BX L240CM JAW DIA2.4MM ORNG L CAP W/ NDL DISP RAD

## (undated) DEVICE — SNARE ENDOSCP 11 MM BRAIDED WIRE CAPTFLX DISP

## (undated) DEVICE — CLIP LIG L235CM RESOL 360 BX/20

## (undated) DEVICE — FORCEPS BX L240CM DIA2.4MM L NDL RAD JAW 4 133340

## (undated) DEVICE — TRAP POLYP ETRAP

## (undated) DEVICE — FORCEPS BX L240CM JAW DIA2.8MM L CAP W/ NDL MIC MESH TOOTH